# Patient Record
Sex: MALE | Race: WHITE | Employment: OTHER | URBAN - METROPOLITAN AREA
[De-identification: names, ages, dates, MRNs, and addresses within clinical notes are randomized per-mention and may not be internally consistent; named-entity substitution may affect disease eponyms.]

---

## 2017-01-24 ENCOUNTER — TELEPHONE (OUTPATIENT)
Dept: FAMILY MEDICINE CLINIC | Age: 47
End: 2017-01-24

## 2017-03-29 RX ORDER — OXYBUTYNIN CHLORIDE 5 MG/1
TABLET ORAL
Qty: 270 TABLET | Refills: 2 | Status: SHIPPED | OUTPATIENT
Start: 2017-03-29 | End: 2017-06-04

## 2017-06-01 ENCOUNTER — TELEPHONE (OUTPATIENT)
Dept: FAMILY MEDICINE CLINIC | Age: 47
End: 2017-06-01

## 2017-06-01 LAB
ALBUMIN SERPL-MCNC: 3.5 G/DL (ref 3.2–5.3)
ALK PHOSPHATASE: 134 IU/L (ref 35–121)
ALT SERPL-CCNC: 12 IU/L (ref 5–59)
ANION GAP SERPL CALCULATED.3IONS-SCNC: 13 MMOL/L
AST SERPL-CCNC: 16 IU/L (ref 10–42)
BILIRUB SERPL-MCNC: 0.2 MG/DL (ref 0.2–1.3)
BUN BLDV-MCNC: 13 MG/DL (ref 10–20)
CALCIUM SERPL-MCNC: 9 MG/DL (ref 8.7–10.8)
CHLORIDE BLD-SCNC: 102 MMOL/L (ref 95–111)
CHOLESTEROL/HDL RATIO: 3.9
CHOLESTEROL: 138 MG/DL
CO2: 27 MMOL/L (ref 21–32)
CREAT SERPL-MCNC: 0.7 MG/DL (ref 0.5–1.3)
EGFR AFRICAN AMERICAN: 146
EGFR IF NONAFRICAN AMERICAN: 121
GLUCOSE: 83 MG/DL (ref 70–100)
HDLC SERPL-MCNC: 35 MG/DL (ref 40–60)
LDL CHOLESTEROL CALCULATED: 76 MG/DL
LDL/HDL RATIO: 2.2
POTASSIUM SERPL-SCNC: 4 MMOL/L (ref 3.5–5.4)
SODIUM BLD-SCNC: 138 MMOL/L (ref 134–147)
TOTAL PROTEIN: 7.2 G/DL (ref 5.8–8)
TRIGL SERPL-MCNC: 133 MG/DL
VLDLC SERPL CALC-MCNC: 27 MG/DL

## 2017-06-14 ENCOUNTER — OFFICE VISIT (OUTPATIENT)
Dept: FAMILY MEDICINE CLINIC | Age: 47
End: 2017-06-14

## 2017-06-14 VITALS
TEMPERATURE: 98.6 F | SYSTOLIC BLOOD PRESSURE: 124 MMHG | RESPIRATION RATE: 18 BRPM | DIASTOLIC BLOOD PRESSURE: 86 MMHG | HEART RATE: 76 BPM

## 2017-06-14 DIAGNOSIS — Z74.09 IMPAIRED MOBILITY AND ADLS: ICD-10-CM

## 2017-06-14 DIAGNOSIS — Z78.9 IMPAIRED MOBILITY AND ADLS: ICD-10-CM

## 2017-06-14 DIAGNOSIS — N39.0 URINARY TRACT INFECTION WITH HEMATURIA, SITE UNSPECIFIED: Primary | ICD-10-CM

## 2017-06-14 DIAGNOSIS — G82.20 LOWER PARAPLEGIA (HCC): Chronic | ICD-10-CM

## 2017-06-14 DIAGNOSIS — R31.9 URINARY TRACT INFECTION WITH HEMATURIA, SITE UNSPECIFIED: Primary | ICD-10-CM

## 2017-06-14 LAB
BILIRUBIN, POC: ABNORMAL
BLOOD URINE, POC: ABNORMAL
CLARITY, POC: ABNORMAL
COLOR, POC: ABNORMAL
GLUCOSE URINE, POC: ABNORMAL
KETONES, POC: ABNORMAL
LEUKOCYTE EST, POC: ABNORMAL
NITRITE, POC: ABNORMAL
PH, POC: 7
PROTEIN, POC: ABNORMAL
SPECIFIC GRAVITY, POC: 1.02
UROBILINOGEN, POC: 0.2

## 2017-06-14 PROCEDURE — 99214 OFFICE O/P EST MOD 30 MIN: CPT | Performed by: FAMILY MEDICINE

## 2017-06-14 PROCEDURE — 1111F DSCHRG MED/CURRENT MED MERGE: CPT | Performed by: FAMILY MEDICINE

## 2017-06-14 PROCEDURE — G8427 DOCREV CUR MEDS BY ELIG CLIN: HCPCS | Performed by: FAMILY MEDICINE

## 2017-06-14 PROCEDURE — 1036F TOBACCO NON-USER: CPT | Performed by: FAMILY MEDICINE

## 2017-06-14 PROCEDURE — 81002 URINALYSIS NONAUTO W/O SCOPE: CPT | Performed by: FAMILY MEDICINE

## 2017-06-14 PROCEDURE — G8420 CALC BMI NORM PARAMETERS: HCPCS | Performed by: FAMILY MEDICINE

## 2017-06-14 RX ORDER — CIPROFLOXACIN 500 MG/1
500 TABLET, FILM COATED ORAL 2 TIMES DAILY
Qty: 20 TABLET | Refills: 0 | Status: SHIPPED | OUTPATIENT
Start: 2017-06-14 | End: 2017-06-24

## 2017-06-14 ASSESSMENT — ENCOUNTER SYMPTOMS
DIARRHEA: 0
CONSTIPATION: 0
SHORTNESS OF BREATH: 0
RHINORRHEA: 0
NAUSEA: 0
ABDOMINAL DISTENTION: 0
SINUS PRESSURE: 0
COUGH: 0
EYE PAIN: 0
SORE THROAT: 0
ABDOMINAL PAIN: 0

## 2017-06-16 LAB — URINE CULTURE, ROUTINE: NORMAL

## 2017-07-17 ENCOUNTER — APPOINTMENT (OUTPATIENT)
Dept: GENERAL RADIOLOGY | Age: 47
DRG: 690 | End: 2017-07-17
Payer: MEDICARE

## 2017-07-17 ENCOUNTER — APPOINTMENT (OUTPATIENT)
Dept: CT IMAGING | Age: 47
DRG: 690 | End: 2017-07-17
Payer: MEDICARE

## 2017-07-17 ENCOUNTER — HOSPITAL ENCOUNTER (INPATIENT)
Age: 47
LOS: 2 days | Discharge: HOME | DRG: 690 | End: 2017-07-19
Attending: HOSPITALIST | Admitting: HOSPITALIST
Payer: MEDICARE

## 2017-07-17 DIAGNOSIS — N10 ACUTE PYELONEPHRITIS: Primary | ICD-10-CM

## 2017-07-17 DIAGNOSIS — R93.5 ABNORMAL ABDOMINAL CT SCAN: ICD-10-CM

## 2017-07-17 LAB
ALBUMIN SERPL-MCNC: 3.7 G/DL (ref 3.5–5.1)
ALP BLD-CCNC: 142 U/L (ref 38–126)
ALT SERPL-CCNC: 14 U/L (ref 11–66)
AMORPHOUS: ABNORMAL
AMPHETAMINE+METHAMPHETAMINE URINE SCREEN: NEGATIVE
ANION GAP SERPL CALCULATED.3IONS-SCNC: 12 MEQ/L (ref 8–16)
ANISOCYTOSIS: ABNORMAL
AST SERPL-CCNC: 21 U/L (ref 5–40)
BACTERIA: ABNORMAL /HPF
BARBITURATE QUANTITATIVE URINE: NEGATIVE
BASOPHILS # BLD: 0.6 %
BASOPHILS ABSOLUTE: 0.1 THOU/MM3 (ref 0–0.1)
BENZODIAZEPINE QUANTITATIVE URINE: NEGATIVE
BILIRUB SERPL-MCNC: < 0.2 MG/DL (ref 0.3–1.2)
BILIRUBIN URINE: NEGATIVE
BLOOD, URINE: ABNORMAL
BUN BLDV-MCNC: 13 MG/DL (ref 7–22)
CALCIUM SERPL-MCNC: 9.7 MG/DL (ref 8.5–10.5)
CANNABINOID QUANTITATIVE URINE: NORMAL
CASTS 2: ABNORMAL /LPF
CASTS UA: ABNORMAL /LPF
CHARACTER, URINE: ABNORMAL
CHLORIDE BLD-SCNC: 97 MEQ/L (ref 98–111)
CO2: 28 MEQ/L (ref 23–33)
COCAINE METABOLITE QUANTITATIVE URINE: NEGATIVE
COLOR: YELLOW
CREAT SERPL-MCNC: 0.7 MG/DL (ref 0.4–1.2)
CRYSTALS, UA: ABNORMAL
EKG ATRIAL RATE: 88 BPM
EKG P AXIS: 40 DEGREES
EKG P-R INTERVAL: 140 MS
EKG Q-T INTERVAL: 354 MS
EKG QRS DURATION: 88 MS
EKG QTC CALCULATION (BAZETT): 428 MS
EKG R AXIS: 26 DEGREES
EKG T AXIS: 25 DEGREES
EKG VENTRICULAR RATE: 88 BPM
EOSINOPHIL # BLD: 1.3 %
EOSINOPHILS ABSOLUTE: 0.1 THOU/MM3 (ref 0–0.4)
EPITHELIAL CELLS, UA: ABNORMAL /HPF
ETHYL ALCOHOL, SERUM: < 0.01 %
GFR SERPL CREATININE-BSD FRML MDRD: > 90 ML/MIN/1.73M2
GLUCOSE BLD-MCNC: 83 MG/DL (ref 70–108)
GLUCOSE URINE: NEGATIVE MG/DL
HCT VFR BLD CALC: 40.1 % (ref 42–52)
HEMOGLOBIN: 12.9 GM/DL (ref 14–18)
HYPOCHROMIA: ABNORMAL
KETONES, URINE: NEGATIVE
LACTIC ACID: 1.7 MMOL/L (ref 0.5–2.2)
LEUKOCYTE ESTERASE, URINE: ABNORMAL
LIPASE: 19.8 U/L (ref 5.6–51.3)
LYMPHOCYTES # BLD: 20.2 %
LYMPHOCYTES ABSOLUTE: 1.9 THOU/MM3 (ref 1–4.8)
MAGNESIUM: 1.8 MG/DL (ref 1.6–2.4)
MCH RBC QN AUTO: 25.7 PG (ref 27–31)
MCHC RBC AUTO-ENTMCNC: 32.2 GM/DL (ref 33–37)
MCV RBC AUTO: 79.8 FL (ref 80–94)
MICROCYTES: ABNORMAL
MISCELLANEOUS 2: ABNORMAL
MONOCYTES # BLD: 11.7 %
MONOCYTES ABSOLUTE: 1.1 THOU/MM3 (ref 0.4–1.3)
NITRITE, URINE: POSITIVE
NUCLEATED RED BLOOD CELLS: 0 /100 WBC
OPIATES, URINE: NORMAL
OXYCODONE: NEGATIVE
PDW BLD-RTO: 15.5 % (ref 11.5–14.5)
PH UA: 6.5
PHENCYCLIDINE QUANTITATIVE URINE: NEGATIVE
PLATELET # BLD: 327 THOU/MM3 (ref 130–400)
PMV BLD AUTO: 8.1 MCM (ref 7.4–10.4)
POTASSIUM SERPL-SCNC: 4 MEQ/L (ref 3.5–5.2)
PROTEIN UA: ABNORMAL
RBC # BLD: 5.03 MILL/MM3 (ref 4.7–6.1)
RBC # BLD: NORMAL 10*6/UL
RBC URINE: ABNORMAL /HPF
RENAL EPITHELIAL, UA: ABNORMAL
SEG NEUTROPHILS: 66.2 %
SEGMENTED NEUTROPHILS ABSOLUTE COUNT: 6.2 THOU/MM3 (ref 1.8–7.7)
SODIUM BLD-SCNC: 137 MEQ/L (ref 135–145)
SPECIFIC GRAVITY, URINE: 1.02 (ref 1–1.03)
TOTAL PROTEIN: 7.7 G/DL (ref 6.1–8)
TROPONIN T: < 0.01 NG/ML
UROBILINOGEN, URINE: 0.2 EU/DL
WBC # BLD: 9.3 THOU/MM3 (ref 4.8–10.8)
WBC UA: > 200 /HPF
YEAST: ABNORMAL

## 2017-07-17 PROCEDURE — 85025 COMPLETE CBC W/AUTO DIFF WBC: CPT

## 2017-07-17 PROCEDURE — 93005 ELECTROCARDIOGRAM TRACING: CPT

## 2017-07-17 PROCEDURE — 96376 TX/PRO/DX INJ SAME DRUG ADON: CPT

## 2017-07-17 PROCEDURE — G0480 DRUG TEST DEF 1-7 CLASSES: HCPCS

## 2017-07-17 PROCEDURE — 2580000003 HC RX 258: Performed by: PHYSICIAN ASSISTANT

## 2017-07-17 PROCEDURE — 36415 COLL VENOUS BLD VENIPUNCTURE: CPT

## 2017-07-17 PROCEDURE — 87086 URINE CULTURE/COLONY COUNT: CPT

## 2017-07-17 PROCEDURE — 96375 TX/PRO/DX INJ NEW DRUG ADDON: CPT

## 2017-07-17 PROCEDURE — 1200000003 HC TELEMETRY R&B

## 2017-07-17 PROCEDURE — 74176 CT ABD & PELVIS W/O CONTRAST: CPT

## 2017-07-17 PROCEDURE — 87040 BLOOD CULTURE FOR BACTERIA: CPT

## 2017-07-17 PROCEDURE — 84484 ASSAY OF TROPONIN QUANT: CPT

## 2017-07-17 PROCEDURE — 83690 ASSAY OF LIPASE: CPT

## 2017-07-17 PROCEDURE — 99222 1ST HOSP IP/OBS MODERATE 55: CPT | Performed by: HOSPITALIST

## 2017-07-17 PROCEDURE — 81001 URINALYSIS AUTO W/SCOPE: CPT

## 2017-07-17 PROCEDURE — 6370000000 HC RX 637 (ALT 250 FOR IP): Performed by: HOSPITALIST

## 2017-07-17 PROCEDURE — 87077 CULTURE AEROBIC IDENTIFY: CPT

## 2017-07-17 PROCEDURE — 80053 COMPREHEN METABOLIC PANEL: CPT

## 2017-07-17 PROCEDURE — 83735 ASSAY OF MAGNESIUM: CPT

## 2017-07-17 PROCEDURE — 2580000003 HC RX 258: Performed by: HOSPITALIST

## 2017-07-17 PROCEDURE — 87186 SC STD MICRODIL/AGAR DIL: CPT

## 2017-07-17 PROCEDURE — 6360000002 HC RX W HCPCS: Performed by: HOSPITALIST

## 2017-07-17 PROCEDURE — 80320 DRUG SCREEN QUANTALCOHOLS: CPT

## 2017-07-17 PROCEDURE — 80305 DRUG TEST PRSMV DIR OPT OBS: CPT

## 2017-07-17 PROCEDURE — 6360000002 HC RX W HCPCS: Performed by: PHYSICIAN ASSISTANT

## 2017-07-17 PROCEDURE — 96365 THER/PROPH/DIAG IV INF INIT: CPT

## 2017-07-17 PROCEDURE — 99285 EMERGENCY DEPT VISIT HI MDM: CPT

## 2017-07-17 PROCEDURE — 71020 XR CHEST STANDARD TWO VW: CPT

## 2017-07-17 PROCEDURE — 83605 ASSAY OF LACTIC ACID: CPT

## 2017-07-17 PROCEDURE — 99221 1ST HOSP IP/OBS SF/LOW 40: CPT | Performed by: NURSE PRACTITIONER

## 2017-07-17 PROCEDURE — 96361 HYDRATE IV INFUSION ADD-ON: CPT

## 2017-07-17 RX ORDER — PANTOPRAZOLE SODIUM 40 MG/1
40 TABLET, DELAYED RELEASE ORAL 2 TIMES DAILY
Status: DISCONTINUED | OUTPATIENT
Start: 2017-07-17 | End: 2017-07-19 | Stop reason: HOSPADM

## 2017-07-17 RX ORDER — OXYCODONE AND ACETAMINOPHEN 10; 325 MG/1; MG/1
1 TABLET ORAL EVERY 8 HOURS PRN
Status: DISCONTINUED | OUTPATIENT
Start: 2017-07-17 | End: 2017-07-19 | Stop reason: HOSPADM

## 2017-07-17 RX ORDER — SODIUM CHLORIDE 0.9 % (FLUSH) 0.9 %
10 SYRINGE (ML) INJECTION EVERY 12 HOURS SCHEDULED
Status: DISCONTINUED | OUTPATIENT
Start: 2017-07-17 | End: 2017-07-19 | Stop reason: HOSPADM

## 2017-07-17 RX ORDER — FENTANYL 25 UG/H
1 PATCH TRANSDERMAL
Status: DISCONTINUED | OUTPATIENT
Start: 2017-07-17 | End: 2017-07-19 | Stop reason: HOSPADM

## 2017-07-17 RX ORDER — AMITRIPTYLINE HYDROCHLORIDE 75 MG/1
75 TABLET, FILM COATED ORAL NIGHTLY
Status: DISCONTINUED | OUTPATIENT
Start: 2017-07-17 | End: 2017-07-19 | Stop reason: HOSPADM

## 2017-07-17 RX ORDER — AMLODIPINE BESYLATE 10 MG/1
10 TABLET ORAL DAILY
Status: DISCONTINUED | OUTPATIENT
Start: 2017-07-17 | End: 2017-07-19 | Stop reason: HOSPADM

## 2017-07-17 RX ORDER — 0.9 % SODIUM CHLORIDE 0.9 %
500 INTRAVENOUS SOLUTION INTRAVENOUS ONCE
Status: COMPLETED | OUTPATIENT
Start: 2017-07-17 | End: 2017-07-17

## 2017-07-17 RX ORDER — METOPROLOL TARTRATE 50 MG/1
50 TABLET, FILM COATED ORAL 2 TIMES DAILY
Status: DISCONTINUED | OUTPATIENT
Start: 2017-07-17 | End: 2017-07-19 | Stop reason: HOSPADM

## 2017-07-17 RX ORDER — SODIUM CHLORIDE 0.9 % (FLUSH) 0.9 %
10 SYRINGE (ML) INJECTION PRN
Status: DISCONTINUED | OUTPATIENT
Start: 2017-07-17 | End: 2017-07-19 | Stop reason: HOSPADM

## 2017-07-17 RX ORDER — ONDANSETRON 2 MG/ML
4 INJECTION INTRAMUSCULAR; INTRAVENOUS ONCE
Status: COMPLETED | OUTPATIENT
Start: 2017-07-17 | End: 2017-07-17

## 2017-07-17 RX ORDER — GABAPENTIN 400 MG/1
800 CAPSULE ORAL 3 TIMES DAILY
Status: DISCONTINUED | OUTPATIENT
Start: 2017-07-17 | End: 2017-07-19 | Stop reason: HOSPADM

## 2017-07-17 RX ORDER — ONDANSETRON 2 MG/ML
4 INJECTION INTRAMUSCULAR; INTRAVENOUS EVERY 6 HOURS PRN
Status: DISCONTINUED | OUTPATIENT
Start: 2017-07-17 | End: 2017-07-19 | Stop reason: HOSPADM

## 2017-07-17 RX ORDER — MORPHINE SULFATE 4 MG/ML
4 INJECTION, SOLUTION INTRAMUSCULAR; INTRAVENOUS ONCE
Status: COMPLETED | OUTPATIENT
Start: 2017-07-17 | End: 2017-07-17

## 2017-07-17 RX ORDER — MORPHINE SULFATE 2 MG/ML
2 INJECTION, SOLUTION INTRAMUSCULAR; INTRAVENOUS EVERY 4 HOURS PRN
Status: DISCONTINUED | OUTPATIENT
Start: 2017-07-17 | End: 2017-07-19 | Stop reason: HOSPADM

## 2017-07-17 RX ORDER — OXYBUTYNIN CHLORIDE 5 MG/1
5 TABLET ORAL 3 TIMES DAILY
Status: DISCONTINUED | OUTPATIENT
Start: 2017-07-17 | End: 2017-07-19 | Stop reason: HOSPADM

## 2017-07-17 RX ORDER — ACETAMINOPHEN 325 MG/1
650 TABLET ORAL EVERY 4 HOURS PRN
Status: DISCONTINUED | OUTPATIENT
Start: 2017-07-17 | End: 2017-07-19 | Stop reason: HOSPADM

## 2017-07-17 RX ADMIN — MORPHINE SULFATE 4 MG: 4 INJECTION, SOLUTION INTRAMUSCULAR; INTRAVENOUS at 08:51

## 2017-07-17 RX ADMIN — ENOXAPARIN SODIUM 40 MG: 40 INJECTION SUBCUTANEOUS at 13:35

## 2017-07-17 RX ADMIN — AMITRIPTYLINE HYDROCHLORIDE 75 MG: 75 TABLET, FILM COATED ORAL at 20:42

## 2017-07-17 RX ADMIN — VANCOMYCIN HYDROCHLORIDE 1000 MG: 1 INJECTION, POWDER, LYOPHILIZED, FOR SOLUTION INTRAVENOUS at 14:49

## 2017-07-17 RX ADMIN — OXYBUTYNIN CHLORIDE 5 MG: 5 TABLET ORAL at 14:52

## 2017-07-17 RX ADMIN — METOPROLOL TARTRATE 50 MG: 50 TABLET, FILM COATED ORAL at 14:52

## 2017-07-17 RX ADMIN — SODIUM CHLORIDE 1000 ML: 9 INJECTION, SOLUTION INTRAVENOUS at 07:12

## 2017-07-17 RX ADMIN — MORPHINE SULFATE 2 MG: 2 INJECTION, SOLUTION INTRAMUSCULAR; INTRAVENOUS at 13:36

## 2017-07-17 RX ADMIN — Medication 10 ML: at 13:37

## 2017-07-17 RX ADMIN — ONDANSETRON 4 MG: 2 INJECTION INTRAMUSCULAR; INTRAVENOUS at 07:12

## 2017-07-17 RX ADMIN — Medication 10 ML: at 20:43

## 2017-07-17 RX ADMIN — METOPROLOL TARTRATE 50 MG: 50 TABLET, FILM COATED ORAL at 20:43

## 2017-07-17 RX ADMIN — OXYBUTYNIN CHLORIDE 5 MG: 5 TABLET ORAL at 20:42

## 2017-07-17 RX ADMIN — GABAPENTIN 800 MG: 400 CAPSULE ORAL at 20:42

## 2017-07-17 RX ADMIN — PANTOPRAZOLE SODIUM 40 MG: 40 TABLET, DELAYED RELEASE ORAL at 14:52

## 2017-07-17 RX ADMIN — PIPERACILLIN SODIUM,TAZOBACTAM SODIUM 3.38 G: 3; .375 INJECTION, POWDER, FOR SOLUTION INTRAVENOUS at 15:00

## 2017-07-17 RX ADMIN — MORPHINE SULFATE 4 MG: 4 INJECTION, SOLUTION INTRAMUSCULAR; INTRAVENOUS at 07:12

## 2017-07-17 RX ADMIN — CEFTRIAXONE 1 G: 1 INJECTION, POWDER, FOR SOLUTION INTRAMUSCULAR; INTRAVENOUS at 08:50

## 2017-07-17 RX ADMIN — GABAPENTIN 800 MG: 400 CAPSULE ORAL at 14:52

## 2017-07-17 RX ADMIN — AMLODIPINE BESYLATE 10 MG: 10 TABLET ORAL at 14:52

## 2017-07-17 RX ADMIN — PANTOPRAZOLE SODIUM 40 MG: 40 TABLET, DELAYED RELEASE ORAL at 20:42

## 2017-07-17 RX ADMIN — PIPERACILLIN SODIUM,TAZOBACTAM SODIUM 3.38 G: 3; .375 INJECTION, POWDER, FOR SOLUTION INTRAVENOUS at 22:32

## 2017-07-17 RX ADMIN — OXYCODONE HYDROCHLORIDE AND ACETAMINOPHEN 1 TABLET: 10; 325 TABLET ORAL at 16:21

## 2017-07-17 RX ADMIN — MORPHINE SULFATE 2 MG: 2 INJECTION, SOLUTION INTRAMUSCULAR; INTRAVENOUS at 20:42

## 2017-07-17 ASSESSMENT — PAIN DESCRIPTION - LOCATION
LOCATION: FLANK

## 2017-07-17 ASSESSMENT — ENCOUNTER SYMPTOMS
NAUSEA: 0
BACK PAIN: 0
EYE REDNESS: 0
EYE DISCHARGE: 0
ABDOMINAL PAIN: 1
SORE THROAT: 0
DIARRHEA: 0
VOMITING: 0
SHORTNESS OF BREATH: 0
RHINORRHEA: 0
COUGH: 0
WHEEZING: 0

## 2017-07-17 ASSESSMENT — PAIN SCALES - GENERAL
PAINLEVEL_OUTOF10: 8
PAINLEVEL_OUTOF10: 9
PAINLEVEL_OUTOF10: 8
PAINLEVEL_OUTOF10: 9
PAINLEVEL_OUTOF10: 7
PAINLEVEL_OUTOF10: 9
PAINLEVEL_OUTOF10: 9
PAINLEVEL_OUTOF10: 8
PAINLEVEL_OUTOF10: 9
PAINLEVEL_OUTOF10: 4
PAINLEVEL_OUTOF10: 6

## 2017-07-17 ASSESSMENT — PAIN DESCRIPTION - ORIENTATION
ORIENTATION: RIGHT

## 2017-07-17 ASSESSMENT — PAIN DESCRIPTION - PAIN TYPE
TYPE: ACUTE PAIN

## 2017-07-17 ASSESSMENT — PAIN DESCRIPTION - ONSET: ONSET: AWAKENED FROM SLEEP

## 2017-07-17 ASSESSMENT — PAIN DESCRIPTION - FREQUENCY: FREQUENCY: OTHER (COMMENT)

## 2017-07-18 LAB
ANION GAP SERPL CALCULATED.3IONS-SCNC: 11 MEQ/L (ref 8–16)
ANISOCYTOSIS: ABNORMAL
BASOPHILS # BLD: 0.5 %
BASOPHILS ABSOLUTE: 0 THOU/MM3 (ref 0–0.1)
BUN BLDV-MCNC: 11 MG/DL (ref 7–22)
CALCIUM SERPL-MCNC: 8.6 MG/DL (ref 8.5–10.5)
CHLORIDE BLD-SCNC: 100 MEQ/L (ref 98–111)
CO2: 26 MEQ/L (ref 23–33)
CREAT SERPL-MCNC: 0.8 MG/DL (ref 0.4–1.2)
EOSINOPHIL # BLD: 2.5 %
EOSINOPHILS ABSOLUTE: 0.2 THOU/MM3 (ref 0–0.4)
GFR SERPL CREATININE-BSD FRML MDRD: > 90 ML/MIN/1.73M2
GLUCOSE BLD-MCNC: 115 MG/DL (ref 70–108)
HCT VFR BLD CALC: 37.5 % (ref 42–52)
HEMOGLOBIN: 12 GM/DL (ref 14–18)
HYPOCHROMIA: ABNORMAL
LYMPHOCYTES # BLD: 22.3 %
LYMPHOCYTES ABSOLUTE: 2.2 THOU/MM3 (ref 1–4.8)
MCH RBC QN AUTO: 25.7 PG (ref 27–31)
MCHC RBC AUTO-ENTMCNC: 32 GM/DL (ref 33–37)
MCV RBC AUTO: 80.4 FL (ref 80–94)
MICROCYTES: ABNORMAL
MONOCYTES # BLD: 9.9 %
MONOCYTES ABSOLUTE: 1 THOU/MM3 (ref 0.4–1.3)
NUCLEATED RED BLOOD CELLS: 0 /100 WBC
PDW BLD-RTO: 16 % (ref 11.5–14.5)
PLATELET # BLD: 321 THOU/MM3 (ref 130–400)
PMV BLD AUTO: 8.1 MCM (ref 7.4–10.4)
POTASSIUM SERPL-SCNC: 4.3 MEQ/L (ref 3.5–5.2)
RBC # BLD: 4.66 MILL/MM3 (ref 4.7–6.1)
RBC # BLD: NORMAL 10*6/UL
SEG NEUTROPHILS: 64.8 %
SEGMENTED NEUTROPHILS ABSOLUTE COUNT: 6.4 THOU/MM3 (ref 1.8–7.7)
SODIUM BLD-SCNC: 137 MEQ/L (ref 135–145)
WBC # BLD: 9.8 THOU/MM3 (ref 4.8–10.8)

## 2017-07-18 PROCEDURE — 6360000002 HC RX W HCPCS: Performed by: HOSPITALIST

## 2017-07-18 PROCEDURE — 1200000003 HC TELEMETRY R&B

## 2017-07-18 PROCEDURE — 99232 SBSQ HOSP IP/OBS MODERATE 35: CPT | Performed by: INTERNAL MEDICINE

## 2017-07-18 PROCEDURE — 6370000000 HC RX 637 (ALT 250 FOR IP): Performed by: HOSPITALIST

## 2017-07-18 PROCEDURE — 85025 COMPLETE CBC W/AUTO DIFF WBC: CPT

## 2017-07-18 PROCEDURE — 80048 BASIC METABOLIC PNL TOTAL CA: CPT

## 2017-07-18 PROCEDURE — 36415 COLL VENOUS BLD VENIPUNCTURE: CPT

## 2017-07-18 PROCEDURE — 2580000003 HC RX 258: Performed by: HOSPITALIST

## 2017-07-18 RX ORDER — TRAMADOL HYDROCHLORIDE 50 MG/1
TABLET ORAL EVERY 6 HOURS PRN
Status: ON HOLD | COMMUNITY
End: 2019-06-13 | Stop reason: HOSPADM

## 2017-07-18 RX ADMIN — GABAPENTIN 800 MG: 400 CAPSULE ORAL at 08:29

## 2017-07-18 RX ADMIN — PIPERACILLIN SODIUM,TAZOBACTAM SODIUM 3.38 G: 3; .375 INJECTION, POWDER, FOR SOLUTION INTRAVENOUS at 15:32

## 2017-07-18 RX ADMIN — Medication 10 ML: at 20:22

## 2017-07-18 RX ADMIN — METOPROLOL TARTRATE 50 MG: 50 TABLET, FILM COATED ORAL at 08:29

## 2017-07-18 RX ADMIN — PANTOPRAZOLE SODIUM 40 MG: 40 TABLET, DELAYED RELEASE ORAL at 20:22

## 2017-07-18 RX ADMIN — OXYCODONE HYDROCHLORIDE AND ACETAMINOPHEN 1 TABLET: 10; 325 TABLET ORAL at 08:34

## 2017-07-18 RX ADMIN — AMITRIPTYLINE HYDROCHLORIDE 75 MG: 75 TABLET, FILM COATED ORAL at 20:22

## 2017-07-18 RX ADMIN — VANCOMYCIN HYDROCHLORIDE 1000 MG: 1 INJECTION, POWDER, LYOPHILIZED, FOR SOLUTION INTRAVENOUS at 14:27

## 2017-07-18 RX ADMIN — PIPERACILLIN SODIUM,TAZOBACTAM SODIUM 3.38 G: 3; .375 INJECTION, POWDER, FOR SOLUTION INTRAVENOUS at 06:37

## 2017-07-18 RX ADMIN — MORPHINE SULFATE 2 MG: 2 INJECTION, SOLUTION INTRAMUSCULAR; INTRAVENOUS at 15:36

## 2017-07-18 RX ADMIN — PIPERACILLIN SODIUM,TAZOBACTAM SODIUM 3.38 G: 3; .375 INJECTION, POWDER, FOR SOLUTION INTRAVENOUS at 22:07

## 2017-07-18 RX ADMIN — VANCOMYCIN HYDROCHLORIDE 1000 MG: 1 INJECTION, POWDER, LYOPHILIZED, FOR SOLUTION INTRAVENOUS at 02:14

## 2017-07-18 RX ADMIN — GABAPENTIN 800 MG: 400 CAPSULE ORAL at 14:24

## 2017-07-18 RX ADMIN — ONDANSETRON 4 MG: 2 INJECTION INTRAMUSCULAR; INTRAVENOUS at 20:22

## 2017-07-18 RX ADMIN — ENOXAPARIN SODIUM 40 MG: 40 INJECTION SUBCUTANEOUS at 14:22

## 2017-07-18 RX ADMIN — Medication 10 ML: at 08:30

## 2017-07-18 RX ADMIN — MORPHINE SULFATE 2 MG: 2 INJECTION, SOLUTION INTRAMUSCULAR; INTRAVENOUS at 02:15

## 2017-07-18 RX ADMIN — GABAPENTIN 800 MG: 400 CAPSULE ORAL at 20:22

## 2017-07-18 RX ADMIN — MORPHINE SULFATE 2 MG: 2 INJECTION, SOLUTION INTRAMUSCULAR; INTRAVENOUS at 20:22

## 2017-07-18 RX ADMIN — AMLODIPINE BESYLATE 10 MG: 10 TABLET ORAL at 08:29

## 2017-07-18 RX ADMIN — PANTOPRAZOLE SODIUM 40 MG: 40 TABLET, DELAYED RELEASE ORAL at 08:29

## 2017-07-18 RX ADMIN — OXYCODONE HYDROCHLORIDE AND ACETAMINOPHEN 1 TABLET: 10; 325 TABLET ORAL at 00:50

## 2017-07-18 RX ADMIN — OXYBUTYNIN CHLORIDE 5 MG: 5 TABLET ORAL at 22:07

## 2017-07-18 RX ADMIN — OXYCODONE HYDROCHLORIDE AND ACETAMINOPHEN 1 TABLET: 10; 325 TABLET ORAL at 17:27

## 2017-07-18 RX ADMIN — OXYBUTYNIN CHLORIDE 5 MG: 5 TABLET ORAL at 08:29

## 2017-07-18 RX ADMIN — Medication 10 ML: at 02:15

## 2017-07-18 RX ADMIN — METOPROLOL TARTRATE 50 MG: 50 TABLET, FILM COATED ORAL at 20:22

## 2017-07-18 RX ADMIN — MORPHINE SULFATE 2 MG: 2 INJECTION, SOLUTION INTRAMUSCULAR; INTRAVENOUS at 11:41

## 2017-07-18 RX ADMIN — MORPHINE SULFATE 2 MG: 2 INJECTION, SOLUTION INTRAMUSCULAR; INTRAVENOUS at 06:37

## 2017-07-18 RX ADMIN — OXYBUTYNIN CHLORIDE 5 MG: 5 TABLET ORAL at 14:24

## 2017-07-18 ASSESSMENT — PAIN SCALES - GENERAL
PAINLEVEL_OUTOF10: 8
PAINLEVEL_OUTOF10: 5
PAINLEVEL_OUTOF10: 8
PAINLEVEL_OUTOF10: 7
PAINLEVEL_OUTOF10: 6
PAINLEVEL_OUTOF10: 8
PAINLEVEL_OUTOF10: 7
PAINLEVEL_OUTOF10: 7
PAINLEVEL_OUTOF10: 6
PAINLEVEL_OUTOF10: 7
PAINLEVEL_OUTOF10: 6
PAINLEVEL_OUTOF10: 7
PAINLEVEL_OUTOF10: 8

## 2017-07-18 ASSESSMENT — PAIN DESCRIPTION - PAIN TYPE
TYPE: CHRONIC PAIN
TYPE: ACUTE PAIN;CHRONIC PAIN

## 2017-07-18 ASSESSMENT — PAIN DESCRIPTION - ORIENTATION: ORIENTATION: RIGHT

## 2017-07-18 ASSESSMENT — PAIN DESCRIPTION - LOCATION: LOCATION: FLANK

## 2017-07-19 ENCOUNTER — TELEPHONE (OUTPATIENT)
Dept: FAMILY MEDICINE CLINIC | Age: 47
End: 2017-07-19

## 2017-07-19 VITALS
BODY MASS INDEX: 21.06 KG/M2 | OXYGEN SATURATION: 98 % | HEIGHT: 70 IN | SYSTOLIC BLOOD PRESSURE: 118 MMHG | RESPIRATION RATE: 16 BRPM | HEART RATE: 68 BPM | DIASTOLIC BLOOD PRESSURE: 62 MMHG | TEMPERATURE: 98.3 F | WEIGHT: 147.1 LBS

## 2017-07-19 LAB
ORGANISM: ABNORMAL
URINE CULTURE REFLEX: ABNORMAL
VANCOMYCIN TROUGH: 11.4 UG/ML (ref 5–15)

## 2017-07-19 PROCEDURE — 6370000000 HC RX 637 (ALT 250 FOR IP): Performed by: HOSPITALIST

## 2017-07-19 PROCEDURE — 6370000000 HC RX 637 (ALT 250 FOR IP): Performed by: INTERNAL MEDICINE

## 2017-07-19 PROCEDURE — 2580000003 HC RX 258: Performed by: HOSPITALIST

## 2017-07-19 PROCEDURE — 99238 HOSP IP/OBS DSCHRG MGMT 30/<: CPT | Performed by: INTERNAL MEDICINE

## 2017-07-19 PROCEDURE — 6360000002 HC RX W HCPCS: Performed by: HOSPITALIST

## 2017-07-19 PROCEDURE — 36415 COLL VENOUS BLD VENIPUNCTURE: CPT

## 2017-07-19 PROCEDURE — 80202 ASSAY OF VANCOMYCIN: CPT

## 2017-07-19 RX ORDER — SULFAMETHOXAZOLE AND TRIMETHOPRIM 800; 160 MG/1; MG/1
1 TABLET ORAL EVERY 12 HOURS SCHEDULED
Qty: 28 TABLET | Refills: 0 | Status: SHIPPED | OUTPATIENT
Start: 2017-07-19 | End: 2017-08-02

## 2017-07-19 RX ORDER — METOPROLOL TARTRATE 50 MG/1
50 TABLET, FILM COATED ORAL 2 TIMES DAILY
Qty: 60 TABLET | Refills: 3 | Status: SHIPPED | OUTPATIENT
Start: 2017-07-19 | End: 2018-04-10

## 2017-07-19 RX ORDER — AMLODIPINE BESYLATE 10 MG/1
10 TABLET ORAL DAILY
Qty: 30 TABLET | Refills: 3 | Status: SHIPPED | OUTPATIENT
Start: 2017-07-19 | End: 2018-04-10

## 2017-07-19 RX ORDER — SULFAMETHOXAZOLE AND TRIMETHOPRIM 800; 160 MG/1; MG/1
1 TABLET ORAL EVERY 12 HOURS SCHEDULED
Status: DISCONTINUED | OUTPATIENT
Start: 2017-07-19 | End: 2017-07-19 | Stop reason: HOSPADM

## 2017-07-19 RX ADMIN — VANCOMYCIN HYDROCHLORIDE 1000 MG: 1 INJECTION, POWDER, LYOPHILIZED, FOR SOLUTION INTRAVENOUS at 03:14

## 2017-07-19 RX ADMIN — OXYBUTYNIN CHLORIDE 5 MG: 5 TABLET ORAL at 08:23

## 2017-07-19 RX ADMIN — OXYCODONE HYDROCHLORIDE AND ACETAMINOPHEN 1 TABLET: 10; 325 TABLET ORAL at 01:31

## 2017-07-19 RX ADMIN — Medication 10 ML: at 08:24

## 2017-07-19 RX ADMIN — SULFAMETHOXAZOLE AND TRIMETHOPRIM 1 TABLET: 800; 160 TABLET ORAL at 10:19

## 2017-07-19 RX ADMIN — AMLODIPINE BESYLATE 10 MG: 10 TABLET ORAL at 08:24

## 2017-07-19 RX ADMIN — GABAPENTIN 800 MG: 400 CAPSULE ORAL at 08:23

## 2017-07-19 RX ADMIN — MORPHINE SULFATE 2 MG: 2 INJECTION, SOLUTION INTRAMUSCULAR; INTRAVENOUS at 09:04

## 2017-07-19 RX ADMIN — METOPROLOL TARTRATE 50 MG: 50 TABLET, FILM COATED ORAL at 08:23

## 2017-07-19 RX ADMIN — OXYCODONE HYDROCHLORIDE AND ACETAMINOPHEN 1 TABLET: 10; 325 TABLET ORAL at 10:19

## 2017-07-19 RX ADMIN — PANTOPRAZOLE SODIUM 40 MG: 40 TABLET, DELAYED RELEASE ORAL at 08:24

## 2017-07-19 RX ADMIN — MORPHINE SULFATE 2 MG: 2 INJECTION, SOLUTION INTRAMUSCULAR; INTRAVENOUS at 02:35

## 2017-07-19 ASSESSMENT — PAIN DESCRIPTION - ORIENTATION: ORIENTATION: RIGHT

## 2017-07-19 ASSESSMENT — PAIN SCALES - GENERAL
PAINLEVEL_OUTOF10: 5
PAINLEVEL_OUTOF10: 8
PAINLEVEL_OUTOF10: 7
PAINLEVEL_OUTOF10: 8
PAINLEVEL_OUTOF10: 3
PAINLEVEL_OUTOF10: 5

## 2017-07-19 ASSESSMENT — PAIN DESCRIPTION - PAIN TYPE: TYPE: CHRONIC PAIN

## 2017-07-19 ASSESSMENT — PAIN DESCRIPTION - LOCATION: LOCATION: FLANK

## 2017-07-20 ENCOUNTER — CARE COORDINATION (OUTPATIENT)
Dept: CASE MANAGEMENT | Age: 47
End: 2017-07-20

## 2017-07-21 ENCOUNTER — TELEPHONE (OUTPATIENT)
Dept: UROLOGY | Age: 47
End: 2017-07-21

## 2017-07-21 ENCOUNTER — TELEPHONE (OUTPATIENT)
Dept: PHARMACY | Age: 47
End: 2017-07-21

## 2017-07-22 LAB
BLOOD CULTURE, ROUTINE: NORMAL
BLOOD CULTURE, ROUTINE: NORMAL

## 2017-07-26 ENCOUNTER — OFFICE VISIT (OUTPATIENT)
Dept: UROLOGY | Age: 47
End: 2017-07-26
Payer: MEDICARE

## 2017-07-26 ENCOUNTER — TELEPHONE (OUTPATIENT)
Dept: UROLOGY | Age: 47
End: 2017-07-26

## 2017-07-26 VITALS
SYSTOLIC BLOOD PRESSURE: 164 MMHG | WEIGHT: 145 LBS | BODY MASS INDEX: 21.48 KG/M2 | DIASTOLIC BLOOD PRESSURE: 92 MMHG | HEIGHT: 69 IN

## 2017-07-26 DIAGNOSIS — N30.00 ACUTE CYSTITIS WITHOUT HEMATURIA: Primary | ICD-10-CM

## 2017-07-26 DIAGNOSIS — N20.0 LEFT NEPHROLITHIASIS: Primary | ICD-10-CM

## 2017-07-26 DIAGNOSIS — Z01.818 PRE-OP TESTING: ICD-10-CM

## 2017-07-26 LAB
BILIRUBIN URINE: NEGATIVE
BLOOD URINE, POC: ABNORMAL
CHARACTER, URINE: ABNORMAL
COLOR, URINE: ABNORMAL
GLUCOSE URINE: NEGATIVE MG/DL
KETONES, URINE: NEGATIVE
LEUKOCYTE CLUMPS, URINE: ABNORMAL
NITRITE, URINE: NEGATIVE
PH, URINE: 6.5
PROTEIN, URINE: 30 MG/DL
SPECIFIC GRAVITY, URINE: 1.02 (ref 1–1.03)
UROBILINOGEN, URINE: 0.2 EU/DL

## 2017-07-26 PROCEDURE — G8427 DOCREV CUR MEDS BY ELIG CLIN: HCPCS | Performed by: NURSE PRACTITIONER

## 2017-07-26 PROCEDURE — G8420 CALC BMI NORM PARAMETERS: HCPCS | Performed by: NURSE PRACTITIONER

## 2017-07-26 PROCEDURE — 99213 OFFICE O/P EST LOW 20 MIN: CPT | Performed by: NURSE PRACTITIONER

## 2017-07-26 PROCEDURE — 1111F DSCHRG MED/CURRENT MED MERGE: CPT | Performed by: NURSE PRACTITIONER

## 2017-07-26 PROCEDURE — 1036F TOBACCO NON-USER: CPT | Performed by: NURSE PRACTITIONER

## 2017-07-26 PROCEDURE — 81003 URINALYSIS AUTO W/O SCOPE: CPT | Performed by: NURSE PRACTITIONER

## 2017-08-10 ENCOUNTER — TELEPHONE (OUTPATIENT)
Dept: UROLOGY | Age: 47
End: 2017-08-10

## 2017-08-10 DIAGNOSIS — R10.9 FLANK PAIN: Primary | ICD-10-CM

## 2017-08-10 RX ORDER — SULFAMETHOXAZOLE AND TRIMETHOPRIM 800; 160 MG/1; MG/1
1 TABLET ORAL 2 TIMES DAILY
Qty: 10 TABLET | Refills: 0 | Status: ON HOLD | OUTPATIENT
Start: 2017-08-10 | End: 2017-08-17

## 2017-08-15 ENCOUNTER — APPOINTMENT (OUTPATIENT)
Dept: ULTRASOUND IMAGING | Age: 47
DRG: 669 | End: 2017-08-15
Attending: UROLOGY
Payer: MEDICARE

## 2017-08-15 ENCOUNTER — APPOINTMENT (OUTPATIENT)
Dept: GENERAL RADIOLOGY | Age: 47
DRG: 669 | End: 2017-08-15
Attending: UROLOGY
Payer: MEDICARE

## 2017-08-15 ENCOUNTER — HOSPITAL ENCOUNTER (INPATIENT)
Age: 47
LOS: 1 days | Discharge: HOME | DRG: 669 | End: 2017-08-18
Attending: UROLOGY | Admitting: UROLOGY
Payer: MEDICARE

## 2017-08-15 ENCOUNTER — ANESTHESIA EVENT (OUTPATIENT)
Dept: OPERATING ROOM | Age: 47
DRG: 669 | End: 2017-08-15
Payer: MEDICARE

## 2017-08-15 ENCOUNTER — HOSPITAL ENCOUNTER (OUTPATIENT)
Dept: INTERVENTIONAL RADIOLOGY/VASCULAR | Age: 47
Discharge: HOME | DRG: 669 | End: 2017-08-15
Attending: UROLOGY
Payer: MEDICARE

## 2017-08-15 ENCOUNTER — ANESTHESIA (OUTPATIENT)
Dept: OPERATING ROOM | Age: 47
DRG: 669 | End: 2017-08-15
Payer: MEDICARE

## 2017-08-15 VITALS
DIASTOLIC BLOOD PRESSURE: 83 MMHG | TEMPERATURE: 98.6 F | SYSTOLIC BLOOD PRESSURE: 152 MMHG | RESPIRATION RATE: 6 BRPM | OXYGEN SATURATION: 100 %

## 2017-08-15 LAB
GLUCOSE BLD-MCNC: 139 MG/DL (ref 70–108)
INR BLD: 0.91 (ref 0.85–1.13)

## 2017-08-15 PROCEDURE — 96376 TX/PRO/DX INJ SAME DRUG ADON: CPT

## 2017-08-15 PROCEDURE — 96375 TX/PRO/DX INJ NEW DRUG ADDON: CPT

## 2017-08-15 PROCEDURE — C1726 CATH, BAL DIL, NON-VASCULAR: HCPCS | Performed by: UROLOGY

## 2017-08-15 PROCEDURE — C1758 CATHETER, URETERAL: HCPCS | Performed by: UROLOGY

## 2017-08-15 PROCEDURE — 50080 PERQ NL/PL LITHOTRP SMPL<2CM: CPT | Performed by: UROLOGY

## 2017-08-15 PROCEDURE — 6360000002 HC RX W HCPCS: Performed by: NURSE PRACTITIONER

## 2017-08-15 PROCEDURE — 6360000002 HC RX W HCPCS: Performed by: NURSE ANESTHETIST, CERTIFIED REGISTERED

## 2017-08-15 PROCEDURE — 7100000001 HC PACU RECOVERY - ADDTL 15 MIN: Performed by: UROLOGY

## 2017-08-15 PROCEDURE — 2580000003 HC RX 258: Performed by: UROLOGY

## 2017-08-15 PROCEDURE — A6258 TRANSPARENT FILM >16<=48 IN: HCPCS

## 2017-08-15 PROCEDURE — 6360000004 HC RX CONTRAST MEDICATION: Performed by: UROLOGY

## 2017-08-15 PROCEDURE — 0TJB8ZZ INSPECTION OF BLADDER, VIA NATURAL OR ARTIFICIAL OPENING ENDOSCOPIC: ICD-10-PCS | Performed by: UROLOGY

## 2017-08-15 PROCEDURE — 3600000014 HC SURGERY LEVEL 4 ADDTL 15MIN: Performed by: UROLOGY

## 2017-08-15 PROCEDURE — C1894 INTRO/SHEATH, NON-LASER: HCPCS

## 2017-08-15 PROCEDURE — 51702 INSERT TEMP BLADDER CATH: CPT

## 2017-08-15 PROCEDURE — 76775 US EXAM ABDO BACK WALL LIM: CPT

## 2017-08-15 PROCEDURE — 3700000001 HC ADD 15 MINUTES (ANESTHESIA): Performed by: UROLOGY

## 2017-08-15 PROCEDURE — C1887 CATHETER, GUIDING: HCPCS

## 2017-08-15 PROCEDURE — 51798 US URINE CAPACITY MEASURE: CPT

## 2017-08-15 PROCEDURE — 96361 HYDRATE IV INFUSION ADD-ON: CPT

## 2017-08-15 PROCEDURE — C1894 INTRO/SHEATH, NON-LASER: HCPCS | Performed by: UROLOGY

## 2017-08-15 PROCEDURE — A5063 DRAIN OSTOMY POUCH W/FLANGE: HCPCS | Performed by: UROLOGY

## 2017-08-15 PROCEDURE — 36415 COLL VENOUS BLD VENIPUNCTURE: CPT

## 2017-08-15 PROCEDURE — 50561 KIDNEY ENDOSCOPY & TREATMENT: CPT | Performed by: UROLOGY

## 2017-08-15 PROCEDURE — 0TC78ZZ EXTIRPATION OF MATTER FROM LEFT URETER, VIA NATURAL OR ARTIFICIAL OPENING ENDOSCOPIC: ICD-10-PCS | Performed by: UROLOGY

## 2017-08-15 PROCEDURE — 6360000002 HC RX W HCPCS: Performed by: ANESTHESIOLOGY

## 2017-08-15 PROCEDURE — 82365 CALCULUS SPECTROSCOPY: CPT

## 2017-08-15 PROCEDURE — 6370000000 HC RX 637 (ALT 250 FOR IP): Performed by: NURSE PRACTITIONER

## 2017-08-15 PROCEDURE — 50432 PLMT NEPHROSTOMY CATHETER: CPT

## 2017-08-15 PROCEDURE — C1773 RET DEV, INSERTABLE: HCPCS | Performed by: UROLOGY

## 2017-08-15 PROCEDURE — 0T9130Z DRAINAGE OF LEFT KIDNEY WITH DRAINAGE DEVICE, PERCUTANEOUS APPROACH: ICD-10-PCS | Performed by: UROLOGY

## 2017-08-15 PROCEDURE — 2500000003 HC RX 250 WO HCPCS: Performed by: NURSE ANESTHETIST, CERTIFIED REGISTERED

## 2017-08-15 PROCEDURE — 96368 THER/DIAG CONCURRENT INF: CPT

## 2017-08-15 PROCEDURE — 74000 XR ABDOMEN LIMITED (KUB): CPT

## 2017-08-15 PROCEDURE — 2580000003 HC RX 258: Performed by: NURSE ANESTHETIST, CERTIFIED REGISTERED

## 2017-08-15 PROCEDURE — 99999 PR OFFICE/OUTPT VISIT,PROCEDURE ONLY: CPT | Performed by: UROLOGY

## 2017-08-15 PROCEDURE — 2580000003 HC RX 258: Performed by: NURSE PRACTITIONER

## 2017-08-15 PROCEDURE — A4414 OST SKNBAR W/O CONV<=4 SQ IN: HCPCS | Performed by: UROLOGY

## 2017-08-15 PROCEDURE — 3600000004 HC SURGERY LEVEL 4 BASE: Performed by: UROLOGY

## 2017-08-15 PROCEDURE — A6258 TRANSPARENT FILM >16<=48 IN: HCPCS | Performed by: UROLOGY

## 2017-08-15 PROCEDURE — 7100000000 HC PACU RECOVERY - FIRST 15 MIN: Performed by: UROLOGY

## 2017-08-15 PROCEDURE — 0T778DZ DILATION OF LEFT URETER WITH INTRALUMINAL DEVICE, VIA NATURAL OR ARTIFICIAL OPENING ENDOSCOPIC: ICD-10-PCS | Performed by: UROLOGY

## 2017-08-15 PROCEDURE — BT1F1ZZ FLUOROSCOPY OF LEFT KIDNEY, URETER AND BLADDER USING LOW OSMOLAR CONTRAST: ICD-10-PCS | Performed by: UROLOGY

## 2017-08-15 PROCEDURE — 96374 THER/PROPH/DIAG INJ IV PUSH: CPT

## 2017-08-15 PROCEDURE — C1769 GUIDE WIRE: HCPCS

## 2017-08-15 PROCEDURE — 85610 PROTHROMBIN TIME: CPT

## 2017-08-15 PROCEDURE — 51701 INSERT BLADDER CATHETER: CPT

## 2017-08-15 PROCEDURE — 3700000000 HC ANESTHESIA ATTENDED CARE: Performed by: UROLOGY

## 2017-08-15 PROCEDURE — 82948 REAGENT STRIP/BLOOD GLUCOSE: CPT

## 2017-08-15 PROCEDURE — 2720000010 HC SURG SUPPLY STERILE: Performed by: UROLOGY

## 2017-08-15 RX ORDER — ONDANSETRON 2 MG/ML
4 INJECTION INTRAMUSCULAR; INTRAVENOUS EVERY 6 HOURS PRN
Status: DISCONTINUED | OUTPATIENT
Start: 2017-08-15 | End: 2017-08-15 | Stop reason: SDUPTHER

## 2017-08-15 RX ORDER — SODIUM CHLORIDE 0.9 % (FLUSH) 0.9 %
10 SYRINGE (ML) INJECTION PRN
Status: DISCONTINUED | OUTPATIENT
Start: 2017-08-15 | End: 2017-08-18 | Stop reason: HOSPADM

## 2017-08-15 RX ORDER — LABETALOL HYDROCHLORIDE 5 MG/ML
5 INJECTION, SOLUTION INTRAVENOUS EVERY 10 MIN PRN
Status: DISCONTINUED | OUTPATIENT
Start: 2017-08-15 | End: 2017-08-15 | Stop reason: HOSPADM

## 2017-08-15 RX ORDER — PROPOFOL 10 MG/ML
INJECTION, EMULSION INTRAVENOUS PRN
Status: DISCONTINUED | OUTPATIENT
Start: 2017-08-15 | End: 2017-08-15 | Stop reason: SDUPTHER

## 2017-08-15 RX ORDER — METOPROLOL TARTRATE 50 MG/1
50 TABLET, FILM COATED ORAL 2 TIMES DAILY
Status: DISCONTINUED | OUTPATIENT
Start: 2017-08-15 | End: 2017-08-18 | Stop reason: HOSPADM

## 2017-08-15 RX ORDER — MORPHINE SULFATE 2 MG/ML
2 INJECTION, SOLUTION INTRAMUSCULAR; INTRAVENOUS
Status: DISCONTINUED | OUTPATIENT
Start: 2017-08-15 | End: 2017-08-18 | Stop reason: HOSPADM

## 2017-08-15 RX ORDER — AMLODIPINE BESYLATE 10 MG/1
10 TABLET ORAL DAILY
Status: DISCONTINUED | OUTPATIENT
Start: 2017-08-15 | End: 2017-08-18 | Stop reason: HOSPADM

## 2017-08-15 RX ORDER — MEPERIDINE HYDROCHLORIDE 25 MG/ML
12.5 INJECTION INTRAMUSCULAR; INTRAVENOUS; SUBCUTANEOUS EVERY 5 MIN PRN
Status: DISCONTINUED | OUTPATIENT
Start: 2017-08-15 | End: 2017-08-15 | Stop reason: HOSPADM

## 2017-08-15 RX ORDER — FENTANYL CITRATE 50 UG/ML
25 INJECTION, SOLUTION INTRAMUSCULAR; INTRAVENOUS EVERY 5 MIN PRN
Status: DISCONTINUED | OUTPATIENT
Start: 2017-08-15 | End: 2017-08-15 | Stop reason: HOSPADM

## 2017-08-15 RX ORDER — OXYCODONE HYDROCHLORIDE AND ACETAMINOPHEN 5; 325 MG/1; MG/1
2 TABLET ORAL EVERY 4 HOURS PRN
Status: DISCONTINUED | OUTPATIENT
Start: 2017-08-15 | End: 2017-08-18 | Stop reason: HOSPADM

## 2017-08-15 RX ORDER — PANTOPRAZOLE SODIUM 40 MG/1
40 TABLET, DELAYED RELEASE ORAL 2 TIMES DAILY
Status: DISCONTINUED | OUTPATIENT
Start: 2017-08-15 | End: 2017-08-18 | Stop reason: HOSPADM

## 2017-08-15 RX ORDER — MORPHINE SULFATE 4 MG/ML
4 INJECTION, SOLUTION INTRAMUSCULAR; INTRAVENOUS
Status: DISCONTINUED | OUTPATIENT
Start: 2017-08-15 | End: 2017-08-15 | Stop reason: SDUPTHER

## 2017-08-15 RX ORDER — ACETAMINOPHEN 325 MG/1
650 TABLET ORAL EVERY 4 HOURS PRN
Status: DISCONTINUED | OUTPATIENT
Start: 2017-08-15 | End: 2017-08-18 | Stop reason: HOSPADM

## 2017-08-15 RX ORDER — MORPHINE SULFATE 2 MG/ML
2 INJECTION, SOLUTION INTRAMUSCULAR; INTRAVENOUS
Status: DISCONTINUED | OUTPATIENT
Start: 2017-08-15 | End: 2017-08-15 | Stop reason: SDUPTHER

## 2017-08-15 RX ORDER — OXYCODONE HYDROCHLORIDE AND ACETAMINOPHEN 5; 325 MG/1; MG/1
2 TABLET ORAL EVERY 4 HOURS PRN
Status: DISCONTINUED | OUTPATIENT
Start: 2017-08-15 | End: 2017-08-15 | Stop reason: SDUPTHER

## 2017-08-15 RX ORDER — SODIUM CHLORIDE 9 MG/ML
INJECTION, SOLUTION INTRAVENOUS CONTINUOUS
Status: DISCONTINUED | OUTPATIENT
Start: 2017-08-15 | End: 2017-08-18 | Stop reason: HOSPADM

## 2017-08-15 RX ORDER — AMITRIPTYLINE HYDROCHLORIDE 75 MG/1
75 TABLET, FILM COATED ORAL NIGHTLY
Status: DISCONTINUED | OUTPATIENT
Start: 2017-08-15 | End: 2017-08-18 | Stop reason: HOSPADM

## 2017-08-15 RX ORDER — GLYCOPYRROLATE 0.2 MG/ML
INJECTION INTRAMUSCULAR; INTRAVENOUS PRN
Status: DISCONTINUED | OUTPATIENT
Start: 2017-08-15 | End: 2017-08-15 | Stop reason: SDUPTHER

## 2017-08-15 RX ORDER — BISACODYL 10 MG
10 SUPPOSITORY, RECTAL RECTAL DAILY PRN
Status: DISCONTINUED | OUTPATIENT
Start: 2017-08-15 | End: 2017-08-18 | Stop reason: HOSPADM

## 2017-08-15 RX ORDER — DOCUSATE SODIUM 100 MG/1
100 CAPSULE, LIQUID FILLED ORAL 2 TIMES DAILY
Status: DISCONTINUED | OUTPATIENT
Start: 2017-08-15 | End: 2017-08-18 | Stop reason: HOSPADM

## 2017-08-15 RX ORDER — OXYCODONE HYDROCHLORIDE AND ACETAMINOPHEN 5; 325 MG/1; MG/1
1 TABLET ORAL EVERY 4 HOURS PRN
Status: DISCONTINUED | OUTPATIENT
Start: 2017-08-15 | End: 2017-08-18 | Stop reason: HOSPADM

## 2017-08-15 RX ORDER — GABAPENTIN 400 MG/1
800 CAPSULE ORAL 3 TIMES DAILY
Status: DISCONTINUED | OUTPATIENT
Start: 2017-08-15 | End: 2017-08-18 | Stop reason: HOSPADM

## 2017-08-15 RX ORDER — ONDANSETRON 2 MG/ML
4 INJECTION INTRAMUSCULAR; INTRAVENOUS EVERY 6 HOURS PRN
Status: DISCONTINUED | OUTPATIENT
Start: 2017-08-15 | End: 2017-08-18 | Stop reason: HOSPADM

## 2017-08-15 RX ORDER — MIDAZOLAM HYDROCHLORIDE 1 MG/ML
INJECTION INTRAMUSCULAR; INTRAVENOUS PRN
Status: DISCONTINUED | OUTPATIENT
Start: 2017-08-15 | End: 2017-08-15 | Stop reason: SDUPTHER

## 2017-08-15 RX ORDER — FENTANYL CITRATE 50 UG/ML
50 INJECTION, SOLUTION INTRAMUSCULAR; INTRAVENOUS EVERY 5 MIN PRN
Status: COMPLETED | OUTPATIENT
Start: 2017-08-15 | End: 2017-08-15

## 2017-08-15 RX ORDER — MORPHINE SULFATE 2 MG/ML
4 INJECTION, SOLUTION INTRAMUSCULAR; INTRAVENOUS
Status: DISCONTINUED | OUTPATIENT
Start: 2017-08-15 | End: 2017-08-18 | Stop reason: HOSPADM

## 2017-08-15 RX ORDER — ATROPA BELLADONNA AND OPIUM 16.2; 3 MG/1; MG/1
30 SUPPOSITORY RECTAL EVERY 8 HOURS PRN
Status: DISCONTINUED | OUTPATIENT
Start: 2017-08-15 | End: 2017-08-18 | Stop reason: HOSPADM

## 2017-08-15 RX ORDER — NEOSTIGMINE METHYLSULFATE 1 MG/ML
INJECTION, SOLUTION INTRAVENOUS PRN
Status: DISCONTINUED | OUTPATIENT
Start: 2017-08-15 | End: 2017-08-15 | Stop reason: SDUPTHER

## 2017-08-15 RX ORDER — SODIUM CHLORIDE 9 MG/ML
INJECTION, SOLUTION INTRAVENOUS CONTINUOUS PRN
Status: DISCONTINUED | OUTPATIENT
Start: 2017-08-15 | End: 2017-08-15 | Stop reason: SDUPTHER

## 2017-08-15 RX ORDER — MORPHINE SULFATE 10 MG/ML
INJECTION, SOLUTION INTRAMUSCULAR; INTRAVENOUS PRN
Status: DISCONTINUED | OUTPATIENT
Start: 2017-08-15 | End: 2017-08-15 | Stop reason: SDUPTHER

## 2017-08-15 RX ORDER — OXYBUTYNIN CHLORIDE 5 MG/1
5 TABLET ORAL EVERY 8 HOURS PRN
Status: DISCONTINUED | OUTPATIENT
Start: 2017-08-15 | End: 2017-08-18 | Stop reason: HOSPADM

## 2017-08-15 RX ORDER — CIPROFLOXACIN 2 MG/ML
INJECTION, SOLUTION INTRAVENOUS PRN
Status: DISCONTINUED | OUTPATIENT
Start: 2017-08-15 | End: 2017-08-15 | Stop reason: SDUPTHER

## 2017-08-15 RX ORDER — CIPROFLOXACIN 2 MG/ML
400 INJECTION, SOLUTION INTRAVENOUS ONCE
Status: DISCONTINUED | OUTPATIENT
Start: 2017-08-15 | End: 2017-08-18 | Stop reason: HOSPADM

## 2017-08-15 RX ORDER — PROMETHAZINE HYDROCHLORIDE 25 MG/ML
12.5 INJECTION, SOLUTION INTRAMUSCULAR; INTRAVENOUS
Status: COMPLETED | OUTPATIENT
Start: 2017-08-15 | End: 2017-08-15

## 2017-08-15 RX ORDER — TAMSULOSIN HYDROCHLORIDE 0.4 MG/1
0.4 CAPSULE ORAL DAILY
Status: DISCONTINUED | OUTPATIENT
Start: 2017-08-15 | End: 2017-08-18 | Stop reason: HOSPADM

## 2017-08-15 RX ORDER — SODIUM CHLORIDE 0.9 % (FLUSH) 0.9 %
10 SYRINGE (ML) INJECTION EVERY 12 HOURS SCHEDULED
Status: DISCONTINUED | OUTPATIENT
Start: 2017-08-15 | End: 2017-08-18 | Stop reason: HOSPADM

## 2017-08-15 RX ORDER — ROCURONIUM BROMIDE 10 MG/ML
INJECTION, SOLUTION INTRAVENOUS PRN
Status: DISCONTINUED | OUTPATIENT
Start: 2017-08-15 | End: 2017-08-15 | Stop reason: SDUPTHER

## 2017-08-15 RX ORDER — METOCLOPRAMIDE HYDROCHLORIDE 5 MG/ML
10 INJECTION INTRAMUSCULAR; INTRAVENOUS
Status: DISCONTINUED | OUTPATIENT
Start: 2017-08-15 | End: 2017-08-15 | Stop reason: HOSPADM

## 2017-08-15 RX ORDER — LORAZEPAM 2 MG/ML
0.5 INJECTION INTRAMUSCULAR ONCE
Status: COMPLETED | OUTPATIENT
Start: 2017-08-15 | End: 2017-08-15

## 2017-08-15 RX ORDER — DIPHENHYDRAMINE HYDROCHLORIDE 50 MG/ML
12.5 INJECTION INTRAMUSCULAR; INTRAVENOUS
Status: DISCONTINUED | OUTPATIENT
Start: 2017-08-15 | End: 2017-08-15 | Stop reason: HOSPADM

## 2017-08-15 RX ADMIN — CEFTRIAXONE 1 G: 1 INJECTION, POWDER, FOR SOLUTION INTRAMUSCULAR; INTRAVENOUS at 14:34

## 2017-08-15 RX ADMIN — HYDROMORPHONE HYDROCHLORIDE 0.5 MG: 1 INJECTION, SOLUTION INTRAMUSCULAR; INTRAVENOUS; SUBCUTANEOUS at 10:30

## 2017-08-15 RX ADMIN — MEPERIDINE HYDROCHLORIDE 12.5 MG: 25 INJECTION, SOLUTION INTRAMUSCULAR; INTRAVENOUS; SUBCUTANEOUS at 10:50

## 2017-08-15 RX ADMIN — MORPHINE SULFATE 4 MG: 2 INJECTION, SOLUTION INTRAMUSCULAR; INTRAVENOUS at 12:01

## 2017-08-15 RX ADMIN — FENTANYL CITRATE 50 MCG: 50 INJECTION INTRAMUSCULAR; INTRAVENOUS at 07:49

## 2017-08-15 RX ADMIN — GABAPENTIN 800 MG: 400 CAPSULE ORAL at 14:33

## 2017-08-15 RX ADMIN — ATROPA BELLADONNA AND OPIUM 30 MG: 16.2; 3 SUPPOSITORY RECTAL at 13:15

## 2017-08-15 RX ADMIN — FENTANYL CITRATE 50 MCG: 50 INJECTION INTRAMUSCULAR; INTRAVENOUS at 10:10

## 2017-08-15 RX ADMIN — FENTANYL CITRATE 50 MCG: 50 INJECTION INTRAMUSCULAR; INTRAVENOUS at 07:45

## 2017-08-15 RX ADMIN — NEOSTIGMINE METHYLSULFATE 3 MG: 1 INJECTION, SOLUTION INTRAVENOUS at 09:42

## 2017-08-15 RX ADMIN — OXYCODONE HYDROCHLORIDE AND ACETAMINOPHEN 2 TABLET: 5; 325 TABLET ORAL at 13:16

## 2017-08-15 RX ADMIN — LORAZEPAM 0.5 MG: 2 INJECTION INTRAMUSCULAR; INTRAVENOUS at 10:46

## 2017-08-15 RX ADMIN — CIPROFLOXACIN 400 MG: 2 INJECTION, SOLUTION INTRAVENOUS at 07:42

## 2017-08-15 RX ADMIN — AMITRIPTYLINE HYDROCHLORIDE 75 MG: 75 TABLET, FILM COATED ORAL at 21:36

## 2017-08-15 RX ADMIN — PANTOPRAZOLE SODIUM 40 MG: 40 TABLET, DELAYED RELEASE ORAL at 21:37

## 2017-08-15 RX ADMIN — TAMSULOSIN HYDROCHLORIDE 0.4 MG: 0.4 CAPSULE ORAL at 14:38

## 2017-08-15 RX ADMIN — MORPHINE SULFATE 10 MG: 10 INJECTION, SOLUTION INTRAMUSCULAR; INTRAVENOUS at 08:50

## 2017-08-15 RX ADMIN — PROMETHAZINE HYDROCHLORIDE 12.5 MG: 25 INJECTION INTRAMUSCULAR; INTRAVENOUS at 10:10

## 2017-08-15 RX ADMIN — MORPHINE SULFATE 2 MG: 2 INJECTION, SOLUTION INTRAMUSCULAR; INTRAVENOUS at 20:49

## 2017-08-15 RX ADMIN — HYDROMORPHONE HYDROCHLORIDE 0.5 MG: 1 INJECTION, SOLUTION INTRAMUSCULAR; INTRAVENOUS; SUBCUTANEOUS at 10:25

## 2017-08-15 RX ADMIN — MORPHINE SULFATE 4 MG: 2 INJECTION, SOLUTION INTRAMUSCULAR; INTRAVENOUS at 16:38

## 2017-08-15 RX ADMIN — GABAPENTIN 800 MG: 400 CAPSULE ORAL at 21:36

## 2017-08-15 RX ADMIN — PROPOFOL 150 MG: 10 INJECTION, EMULSION INTRAVENOUS at 07:49

## 2017-08-15 RX ADMIN — OXYCODONE HYDROCHLORIDE AND ACETAMINOPHEN 2 TABLET: 5; 325 TABLET ORAL at 22:38

## 2017-08-15 RX ADMIN — HYDROMORPHONE HYDROCHLORIDE 0.5 MG: 1 INJECTION, SOLUTION INTRAMUSCULAR; INTRAVENOUS; SUBCUTANEOUS at 10:40

## 2017-08-15 RX ADMIN — METOPROLOL TARTRATE 50 MG: 50 TABLET, FILM COATED ORAL at 21:37

## 2017-08-15 RX ADMIN — OXYCODONE HYDROCHLORIDE AND ACETAMINOPHEN 2 TABLET: 5; 325 TABLET ORAL at 18:21

## 2017-08-15 RX ADMIN — OXYBUTYNIN CHLORIDE 5 MG: 5 TABLET ORAL at 14:34

## 2017-08-15 RX ADMIN — FENTANYL CITRATE 50 MCG: 50 INJECTION INTRAMUSCULAR; INTRAVENOUS at 10:05

## 2017-08-15 RX ADMIN — FENTANYL CITRATE 50 MCG: 50 INJECTION INTRAMUSCULAR; INTRAVENOUS at 10:20

## 2017-08-15 RX ADMIN — ROCURONIUM BROMIDE 30 MG: 10 INJECTION INTRAVENOUS at 07:49

## 2017-08-15 RX ADMIN — MORPHINE SULFATE 4 MG: 2 INJECTION, SOLUTION INTRAMUSCULAR; INTRAVENOUS at 14:34

## 2017-08-15 RX ADMIN — LIDOCAINE HYDROCHLORIDE 45 MG: 20 INJECTION, SOLUTION INTRAVENOUS at 07:49

## 2017-08-15 RX ADMIN — PHENYLEPHRINE HYDROCHLORIDE 100 MCG: 10 INJECTION INTRAMUSCULAR; INTRAVENOUS; SUBCUTANEOUS at 07:58

## 2017-08-15 RX ADMIN — SODIUM CHLORIDE: 9 INJECTION, SOLUTION INTRAVENOUS at 07:25

## 2017-08-15 RX ADMIN — GLYCOPYRROLATE 0.4 MG: 0.2 INJECTION, SOLUTION INTRAMUSCULAR; INTRAVENOUS at 09:42

## 2017-08-15 RX ADMIN — SODIUM CHLORIDE: 9 INJECTION, SOLUTION INTRAVENOUS at 16:49

## 2017-08-15 RX ADMIN — PHENYLEPHRINE HYDROCHLORIDE 200 MCG: 10 INJECTION INTRAMUSCULAR; INTRAVENOUS; SUBCUTANEOUS at 08:01

## 2017-08-15 RX ADMIN — FENTANYL CITRATE 50 MCG: 50 INJECTION INTRAMUSCULAR; INTRAVENOUS at 10:15

## 2017-08-15 RX ADMIN — HYDROMORPHONE HYDROCHLORIDE 0.5 MG: 1 INJECTION, SOLUTION INTRAMUSCULAR; INTRAVENOUS; SUBCUTANEOUS at 10:35

## 2017-08-15 RX ADMIN — MIDAZOLAM 2 MG: 1 INJECTION INTRAMUSCULAR; INTRAVENOUS at 07:45

## 2017-08-15 RX ADMIN — SODIUM CHLORIDE: 9 INJECTION, SOLUTION INTRAVENOUS at 07:44

## 2017-08-15 RX ADMIN — ONDANSETRON 4 MG: 2 INJECTION INTRAMUSCULAR; INTRAVENOUS at 16:37

## 2017-08-15 ASSESSMENT — PULMONARY FUNCTION TESTS
PIF_VALUE: 18
PIF_VALUE: 15
PIF_VALUE: 18
PIF_VALUE: 17
PIF_VALUE: 18
PIF_VALUE: 16
PIF_VALUE: 19
PIF_VALUE: 18
PIF_VALUE: 18
PIF_VALUE: 15
PIF_VALUE: 17
PIF_VALUE: 18
PIF_VALUE: 15
PIF_VALUE: 18
PIF_VALUE: 17
PIF_VALUE: 17
PIF_VALUE: 18
PIF_VALUE: 18
PIF_VALUE: 15
PIF_VALUE: 15
PIF_VALUE: 1
PIF_VALUE: 18
PIF_VALUE: 18
PIF_VALUE: 14
PIF_VALUE: 15
PIF_VALUE: 18
PIF_VALUE: 16
PIF_VALUE: 18
PIF_VALUE: 17
PIF_VALUE: 18
PIF_VALUE: 19
PIF_VALUE: 18
PIF_VALUE: 15
PIF_VALUE: 18
PIF_VALUE: 19
PIF_VALUE: 18
PIF_VALUE: 18
PIF_VALUE: 19
PIF_VALUE: 17
PIF_VALUE: 18
PIF_VALUE: 18
PIF_VALUE: 17
PIF_VALUE: 18
PIF_VALUE: 15
PIF_VALUE: 16
PIF_VALUE: 16
PIF_VALUE: 17
PIF_VALUE: 17
PIF_VALUE: 19
PIF_VALUE: 18
PIF_VALUE: 18
PIF_VALUE: 19
PIF_VALUE: 17
PIF_VALUE: 18
PIF_VALUE: 15
PIF_VALUE: 18
PIF_VALUE: 18
PIF_VALUE: 22
PIF_VALUE: 3
PIF_VALUE: 17
PIF_VALUE: 18
PIF_VALUE: 18
PIF_VALUE: 17
PIF_VALUE: 18
PIF_VALUE: 15
PIF_VALUE: 18
PIF_VALUE: 18
PIF_VALUE: 19
PIF_VALUE: 18
PIF_VALUE: 17
PIF_VALUE: 19
PIF_VALUE: 16
PIF_VALUE: 18
PIF_VALUE: 15
PIF_VALUE: 18
PIF_VALUE: 18
PIF_VALUE: 19
PIF_VALUE: 18
PIF_VALUE: 17
PIF_VALUE: 18
PIF_VALUE: 15
PIF_VALUE: 17
PIF_VALUE: 17
PIF_VALUE: 18
PIF_VALUE: 18
PIF_VALUE: 2
PIF_VALUE: 17
PIF_VALUE: 18
PIF_VALUE: 15
PIF_VALUE: 17
PIF_VALUE: 2
PIF_VALUE: 11
PIF_VALUE: 17
PIF_VALUE: 19
PIF_VALUE: 18
PIF_VALUE: 2
PIF_VALUE: 18
PIF_VALUE: 9
PIF_VALUE: 2
PIF_VALUE: 18
PIF_VALUE: 16

## 2017-08-15 ASSESSMENT — PAIN SCALES - GENERAL
PAINLEVEL_OUTOF10: 8
PAINLEVEL_OUTOF10: 10
PAINLEVEL_OUTOF10: 7
PAINLEVEL_OUTOF10: 8
PAINLEVEL_OUTOF10: 10
PAINLEVEL_OUTOF10: 8
PAINLEVEL_OUTOF10: 10
PAINLEVEL_OUTOF10: 9
PAINLEVEL_OUTOF10: 7
PAINLEVEL_OUTOF10: 8
PAINLEVEL_OUTOF10: 10
PAINLEVEL_OUTOF10: 7
PAINLEVEL_OUTOF10: 10
PAINLEVEL_OUTOF10: 0
PAINLEVEL_OUTOF10: 10
PAINLEVEL_OUTOF10: 5
PAINLEVEL_OUTOF10: 10

## 2017-08-15 ASSESSMENT — PAIN DESCRIPTION - PROGRESSION
CLINICAL_PROGRESSION: GRADUALLY WORSENING
CLINICAL_PROGRESSION: GRADUALLY IMPROVING
CLINICAL_PROGRESSION: NOT CHANGED
CLINICAL_PROGRESSION: GRADUALLY WORSENING
CLINICAL_PROGRESSION: NOT CHANGED

## 2017-08-15 ASSESSMENT — PAIN DESCRIPTION - ONSET
ONSET: ON-GOING

## 2017-08-15 ASSESSMENT — PAIN DESCRIPTION - PAIN TYPE
TYPE: SURGICAL PAIN
TYPE: SURGICAL PAIN
TYPE: CHRONIC PAIN
TYPE: SURGICAL PAIN

## 2017-08-15 ASSESSMENT — PAIN DESCRIPTION - ORIENTATION
ORIENTATION: RIGHT;LEFT
ORIENTATION: RIGHT;LEFT;LOWER
ORIENTATION: LEFT;LOWER
ORIENTATION: LEFT
ORIENTATION: RIGHT;LEFT;LOWER

## 2017-08-15 ASSESSMENT — PAIN DESCRIPTION - FREQUENCY
FREQUENCY: CONTINUOUS

## 2017-08-15 ASSESSMENT — PAIN DESCRIPTION - LOCATION
LOCATION: FLANK
LOCATION: ABDOMEN;FLANK
LOCATION: BACK
LOCATION: ABDOMEN;FLANK
LOCATION: FLANK
LOCATION: GENERALIZED

## 2017-08-15 ASSESSMENT — PAIN DESCRIPTION - DESCRIPTORS
DESCRIPTORS: CONSTANT;DISCOMFORT
DESCRIPTORS: CONSTANT;DISCOMFORT;SHARP;SHOOTING
DESCRIPTORS: CONSTANT;DISCOMFORT;SHARP;SHOOTING
DESCRIPTORS: ACHING;DISCOMFORT

## 2017-08-15 ASSESSMENT — PAIN SCALES - WONG BAKER: WONGBAKER_NUMERICALRESPONSE: 0

## 2017-08-15 ASSESSMENT — ENCOUNTER SYMPTOMS: SHORTNESS OF BREATH: 1

## 2017-08-16 ENCOUNTER — APPOINTMENT (OUTPATIENT)
Dept: INTERVENTIONAL RADIOLOGY/VASCULAR | Age: 47
DRG: 669 | End: 2017-08-16
Attending: UROLOGY
Payer: MEDICARE

## 2017-08-16 LAB
ANION GAP SERPL CALCULATED.3IONS-SCNC: 12 MEQ/L (ref 8–16)
BUN BLDV-MCNC: 10 MG/DL (ref 7–22)
CALCIUM SERPL-MCNC: 8 MG/DL (ref 8.5–10.5)
CHLORIDE BLD-SCNC: 109 MEQ/L (ref 98–111)
CO2: 22 MEQ/L (ref 23–33)
CREAT SERPL-MCNC: 1.1 MG/DL (ref 0.4–1.2)
GFR SERPL CREATININE-BSD FRML MDRD: 72 ML/MIN/1.73M2
GLUCOSE BLD-MCNC: 90 MG/DL (ref 70–108)
HCT VFR BLD CALC: 33.6 % (ref 42–52)
HEMOGLOBIN: 10.9 GM/DL (ref 14–18)
MCH RBC QN AUTO: 26.3 PG (ref 27–31)
MCHC RBC AUTO-ENTMCNC: 32.5 GM/DL (ref 33–37)
MCV RBC AUTO: 81 FL (ref 80–94)
PDW BLD-RTO: 16.2 % (ref 11.5–14.5)
PLATELET # BLD: 279 THOU/MM3 (ref 130–400)
PMV BLD AUTO: 8.1 MCM (ref 7.4–10.4)
POTASSIUM SERPL-SCNC: 4.3 MEQ/L (ref 3.5–5.2)
RBC # BLD: 4.15 MILL/MM3 (ref 4.7–6.1)
SODIUM BLD-SCNC: 143 MEQ/L (ref 135–145)
WBC # BLD: 8.5 THOU/MM3 (ref 4.8–10.8)

## 2017-08-16 PROCEDURE — 96366 THER/PROPH/DIAG IV INF ADDON: CPT

## 2017-08-16 PROCEDURE — 85027 COMPLETE CBC AUTOMATED: CPT

## 2017-08-16 PROCEDURE — 6360000002 HC RX W HCPCS: Performed by: NURSE PRACTITIONER

## 2017-08-16 PROCEDURE — 96376 TX/PRO/DX INJ SAME DRUG ADON: CPT

## 2017-08-16 PROCEDURE — 6370000000 HC RX 637 (ALT 250 FOR IP): Performed by: NURSE PRACTITIONER

## 2017-08-16 PROCEDURE — 80048 BASIC METABOLIC PNL TOTAL CA: CPT

## 2017-08-16 PROCEDURE — 2580000003 HC RX 258: Performed by: NURSE PRACTITIONER

## 2017-08-16 PROCEDURE — 2580000003 HC RX 258: Performed by: UROLOGY

## 2017-08-16 PROCEDURE — 99024 POSTOP FOLLOW-UP VISIT: CPT | Performed by: NURSE PRACTITIONER

## 2017-08-16 PROCEDURE — 36415 COLL VENOUS BLD VENIPUNCTURE: CPT

## 2017-08-16 RX ADMIN — SODIUM CHLORIDE: 9 INJECTION, SOLUTION INTRAVENOUS at 11:22

## 2017-08-16 RX ADMIN — OXYBUTYNIN CHLORIDE 5 MG: 5 TABLET ORAL at 10:23

## 2017-08-16 RX ADMIN — OXYCODONE HYDROCHLORIDE AND ACETAMINOPHEN 2 TABLET: 5; 325 TABLET ORAL at 19:30

## 2017-08-16 RX ADMIN — GABAPENTIN 800 MG: 400 CAPSULE ORAL at 08:59

## 2017-08-16 RX ADMIN — TAMSULOSIN HYDROCHLORIDE 0.4 MG: 0.4 CAPSULE ORAL at 09:01

## 2017-08-16 RX ADMIN — CEFTRIAXONE 1 G: 1 INJECTION, POWDER, FOR SOLUTION INTRAMUSCULAR; INTRAVENOUS at 13:03

## 2017-08-16 RX ADMIN — MORPHINE SULFATE 4 MG: 2 INJECTION, SOLUTION INTRAMUSCULAR; INTRAVENOUS at 04:29

## 2017-08-16 RX ADMIN — DOCUSATE SODIUM 100 MG: 100 CAPSULE ORAL at 20:56

## 2017-08-16 RX ADMIN — GABAPENTIN 800 MG: 400 CAPSULE ORAL at 20:58

## 2017-08-16 RX ADMIN — OXYCODONE HYDROCHLORIDE AND ACETAMINOPHEN 2 TABLET: 5; 325 TABLET ORAL at 14:35

## 2017-08-16 RX ADMIN — OXYCODONE HYDROCHLORIDE AND ACETAMINOPHEN 2 TABLET: 5; 325 TABLET ORAL at 10:28

## 2017-08-16 RX ADMIN — PANTOPRAZOLE SODIUM 40 MG: 40 TABLET, DELAYED RELEASE ORAL at 20:59

## 2017-08-16 RX ADMIN — MORPHINE SULFATE 4 MG: 2 INJECTION, SOLUTION INTRAMUSCULAR; INTRAVENOUS at 20:57

## 2017-08-16 RX ADMIN — PANTOPRAZOLE SODIUM 40 MG: 40 TABLET, DELAYED RELEASE ORAL at 09:01

## 2017-08-16 RX ADMIN — MORPHINE SULFATE 4 MG: 2 INJECTION, SOLUTION INTRAMUSCULAR; INTRAVENOUS at 22:59

## 2017-08-16 RX ADMIN — OXYCODONE HYDROCHLORIDE AND ACETAMINOPHEN 2 TABLET: 5; 325 TABLET ORAL at 06:12

## 2017-08-16 RX ADMIN — SODIUM CHLORIDE: 9 INJECTION, SOLUTION INTRAVENOUS at 21:49

## 2017-08-16 RX ADMIN — METOPROLOL TARTRATE 50 MG: 50 TABLET, FILM COATED ORAL at 20:58

## 2017-08-16 RX ADMIN — MORPHINE SULFATE 4 MG: 2 INJECTION, SOLUTION INTRAMUSCULAR; INTRAVENOUS at 15:49

## 2017-08-16 RX ADMIN — MORPHINE SULFATE 4 MG: 2 INJECTION, SOLUTION INTRAMUSCULAR; INTRAVENOUS at 13:05

## 2017-08-16 RX ADMIN — GABAPENTIN 800 MG: 400 CAPSULE ORAL at 14:35

## 2017-08-16 RX ADMIN — SODIUM CHLORIDE: 9 INJECTION, SOLUTION INTRAVENOUS at 02:20

## 2017-08-16 RX ADMIN — MORPHINE SULFATE 4 MG: 2 INJECTION, SOLUTION INTRAMUSCULAR; INTRAVENOUS at 09:05

## 2017-08-16 RX ADMIN — ONDANSETRON 4 MG: 2 INJECTION INTRAMUSCULAR; INTRAVENOUS at 09:05

## 2017-08-16 RX ADMIN — AMITRIPTYLINE HYDROCHLORIDE 75 MG: 75 TABLET, FILM COATED ORAL at 20:58

## 2017-08-16 RX ADMIN — MORPHINE SULFATE 4 MG: 2 INJECTION, SOLUTION INTRAMUSCULAR; INTRAVENOUS at 17:55

## 2017-08-16 ASSESSMENT — PAIN DESCRIPTION - LOCATION
LOCATION: ABDOMEN;FLANK
LOCATION: FLANK
LOCATION: ABDOMEN;FLANK
LOCATION: FLANK

## 2017-08-16 ASSESSMENT — PAIN SCALES - GENERAL
PAINLEVEL_OUTOF10: 10
PAINLEVEL_OUTOF10: 7
PAINLEVEL_OUTOF10: 8
PAINLEVEL_OUTOF10: 9
PAINLEVEL_OUTOF10: 9
PAINLEVEL_OUTOF10: 10
PAINLEVEL_OUTOF10: 8
PAINLEVEL_OUTOF10: 7
PAINLEVEL_OUTOF10: 7
PAINLEVEL_OUTOF10: 9
PAINLEVEL_OUTOF10: 7
PAINLEVEL_OUTOF10: 9
PAINLEVEL_OUTOF10: 9
PAINLEVEL_OUTOF10: 6
PAINLEVEL_OUTOF10: 10
PAINLEVEL_OUTOF10: 9
PAINLEVEL_OUTOF10: 7
PAINLEVEL_OUTOF10: 10

## 2017-08-16 ASSESSMENT — PAIN DESCRIPTION - ORIENTATION
ORIENTATION: LEFT

## 2017-08-16 ASSESSMENT — PAIN DESCRIPTION - PAIN TYPE
TYPE: SURGICAL PAIN

## 2017-08-17 ENCOUNTER — APPOINTMENT (OUTPATIENT)
Dept: INTERVENTIONAL RADIOLOGY/VASCULAR | Age: 47
DRG: 669 | End: 2017-08-17
Attending: UROLOGY
Payer: MEDICARE

## 2017-08-17 LAB
ANION GAP SERPL CALCULATED.3IONS-SCNC: 8 MEQ/L (ref 8–16)
ANISOCYTOSIS: ABNORMAL
BASOPHILS # BLD: 0.6 %
BASOPHILS ABSOLUTE: 0.1 THOU/MM3 (ref 0–0.1)
BUN BLDV-MCNC: 7 MG/DL (ref 7–22)
CALCIUM SERPL-MCNC: 8.2 MG/DL (ref 8.5–10.5)
CHLORIDE BLD-SCNC: 108 MEQ/L (ref 98–111)
CO2: 24 MEQ/L (ref 23–33)
CREAT SERPL-MCNC: 0.9 MG/DL (ref 0.4–1.2)
EOSINOPHIL # BLD: 1.7 %
EOSINOPHILS ABSOLUTE: 0.1 THOU/MM3 (ref 0–0.4)
GFR SERPL CREATININE-BSD FRML MDRD: > 90 ML/MIN/1.73M2
GLUCOSE BLD-MCNC: 118 MG/DL (ref 70–108)
HCT VFR BLD CALC: 30.9 % (ref 42–52)
HEMOGLOBIN: 9.9 GM/DL (ref 14–18)
HYPOCHROMIA: ABNORMAL
LYMPHOCYTES # BLD: 19.4 %
LYMPHOCYTES ABSOLUTE: 1.7 THOU/MM3 (ref 1–4.8)
MCH RBC QN AUTO: 25.6 PG (ref 27–31)
MCHC RBC AUTO-ENTMCNC: 32 GM/DL (ref 33–37)
MCV RBC AUTO: 80 FL (ref 80–94)
MICROCYTES: ABNORMAL
MONOCYTES # BLD: 11.1 %
MONOCYTES ABSOLUTE: 1 THOU/MM3 (ref 0.4–1.3)
NUCLEATED RED BLOOD CELLS: 0 /100 WBC
PDW BLD-RTO: 16.3 % (ref 11.5–14.5)
PLATELET # BLD: 258 THOU/MM3 (ref 130–400)
PMV BLD AUTO: 7.9 MCM (ref 7.4–10.4)
POTASSIUM SERPL-SCNC: 4.2 MEQ/L (ref 3.5–5.2)
RBC # BLD: 3.86 MILL/MM3 (ref 4.7–6.1)
RBC # BLD: NORMAL 10*6/UL
SEG NEUTROPHILS: 67.2 %
SEGMENTED NEUTROPHILS ABSOLUTE COUNT: 5.9 THOU/MM3 (ref 1.8–7.7)
SODIUM BLD-SCNC: 140 MEQ/L (ref 135–145)
WBC # BLD: 8.8 THOU/MM3 (ref 4.8–10.8)

## 2017-08-17 PROCEDURE — A6258 TRANSPARENT FILM >16<=48 IN: HCPCS

## 2017-08-17 PROCEDURE — 99999 PR OFFICE/OUTPT VISIT,PROCEDURE ONLY: CPT | Performed by: NURSE PRACTITIONER

## 2017-08-17 PROCEDURE — 85025 COMPLETE CBC W/AUTO DIFF WBC: CPT

## 2017-08-17 PROCEDURE — 51798 US URINE CAPACITY MEASURE: CPT

## 2017-08-17 PROCEDURE — 99024 POSTOP FOLLOW-UP VISIT: CPT | Performed by: NURSE PRACTITIONER

## 2017-08-17 PROCEDURE — 6360000004 HC RX CONTRAST MEDICATION: Performed by: RADIOLOGY

## 2017-08-17 PROCEDURE — 6360000002 HC RX W HCPCS: Performed by: NURSE PRACTITIONER

## 2017-08-17 PROCEDURE — 6370000000 HC RX 637 (ALT 250 FOR IP): Performed by: NURSE PRACTITIONER

## 2017-08-17 PROCEDURE — 36415 COLL VENOUS BLD VENIPUNCTURE: CPT

## 2017-08-17 PROCEDURE — 2580000003 HC RX 258: Performed by: NURSE PRACTITIONER

## 2017-08-17 PROCEDURE — 2500000003 HC RX 250 WO HCPCS

## 2017-08-17 PROCEDURE — 1200000000 HC SEMI PRIVATE

## 2017-08-17 PROCEDURE — 6370000000 HC RX 637 (ALT 250 FOR IP)

## 2017-08-17 PROCEDURE — 0TP570Z REMOVAL OF DRAINAGE DEVICE FROM KIDNEY, VIA NATURAL OR ARTIFICIAL OPENING: ICD-10-PCS | Performed by: RADIOLOGY

## 2017-08-17 PROCEDURE — 6370000000 HC RX 637 (ALT 250 FOR IP): Performed by: RADIOLOGY

## 2017-08-17 PROCEDURE — 50389 REMOVE RENAL TUBE W/FLUORO: CPT | Performed by: RADIOLOGY

## 2017-08-17 PROCEDURE — 2580000003 HC RX 258: Performed by: UROLOGY

## 2017-08-17 PROCEDURE — 80048 BASIC METABOLIC PNL TOTAL CA: CPT

## 2017-08-17 RX ORDER — SENNA AND DOCUSATE SODIUM 50; 8.6 MG/1; MG/1
2 TABLET, FILM COATED ORAL 2 TIMES DAILY PRN
Status: DISCONTINUED | OUTPATIENT
Start: 2017-08-17 | End: 2017-08-18 | Stop reason: HOSPADM

## 2017-08-17 RX ORDER — DIAZEPAM 5 MG/1
5 TABLET ORAL EVERY 6 HOURS PRN
Status: DISCONTINUED | OUTPATIENT
Start: 2017-08-17 | End: 2017-08-18 | Stop reason: HOSPADM

## 2017-08-17 RX ORDER — IBUPROFEN 200 MG
TABLET ORAL ONCE
Status: COMPLETED | OUTPATIENT
Start: 2017-08-17 | End: 2017-08-17

## 2017-08-17 RX ORDER — SULFAMETHOXAZOLE AND TRIMETHOPRIM 800; 160 MG/1; MG/1
1 TABLET ORAL 2 TIMES DAILY
Qty: 10 TABLET | Refills: 0 | Status: SHIPPED | OUTPATIENT
Start: 2017-08-17 | End: 2017-08-22

## 2017-08-17 RX ADMIN — BACITRACIN, NEOMYCIN, POLYMYXIN B 3.5 G: 400; 3.5; 5 OINTMENT TOPICAL at 14:40

## 2017-08-17 RX ADMIN — GABAPENTIN 800 MG: 400 CAPSULE ORAL at 15:26

## 2017-08-17 RX ADMIN — MORPHINE SULFATE 4 MG: 2 INJECTION, SOLUTION INTRAMUSCULAR; INTRAVENOUS at 11:02

## 2017-08-17 RX ADMIN — DIAZEPAM 5 MG: 5 TABLET ORAL at 23:18

## 2017-08-17 RX ADMIN — METOPROLOL TARTRATE 50 MG: 50 TABLET, FILM COATED ORAL at 23:20

## 2017-08-17 RX ADMIN — METOPROLOL TARTRATE 50 MG: 50 TABLET, FILM COATED ORAL at 08:22

## 2017-08-17 RX ADMIN — OXYCODONE HYDROCHLORIDE AND ACETAMINOPHEN 2 TABLET: 5; 325 TABLET ORAL at 18:05

## 2017-08-17 RX ADMIN — DOCUSATE SODIUM 100 MG: 100 CAPSULE ORAL at 23:19

## 2017-08-17 RX ADMIN — GABAPENTIN 800 MG: 400 CAPSULE ORAL at 08:22

## 2017-08-17 RX ADMIN — MORPHINE SULFATE 4 MG: 2 INJECTION, SOLUTION INTRAMUSCULAR; INTRAVENOUS at 08:53

## 2017-08-17 RX ADMIN — AMLODIPINE BESYLATE 10 MG: 10 TABLET ORAL at 08:21

## 2017-08-17 RX ADMIN — OXYCODONE HYDROCHLORIDE AND ACETAMINOPHEN 2 TABLET: 5; 325 TABLET ORAL at 00:25

## 2017-08-17 RX ADMIN — PANTOPRAZOLE SODIUM 40 MG: 40 TABLET, DELAYED RELEASE ORAL at 23:20

## 2017-08-17 RX ADMIN — OXYCODONE HYDROCHLORIDE AND ACETAMINOPHEN 2 TABLET: 5; 325 TABLET ORAL at 21:56

## 2017-08-17 RX ADMIN — DOCUSATE SODIUM 100 MG: 100 CAPSULE ORAL at 08:21

## 2017-08-17 RX ADMIN — PANTOPRAZOLE SODIUM 40 MG: 40 TABLET, DELAYED RELEASE ORAL at 08:22

## 2017-08-17 RX ADMIN — MORPHINE SULFATE 4 MG: 2 INJECTION, SOLUTION INTRAMUSCULAR; INTRAVENOUS at 06:32

## 2017-08-17 RX ADMIN — OXYCODONE HYDROCHLORIDE AND ACETAMINOPHEN 2 TABLET: 5; 325 TABLET ORAL at 12:16

## 2017-08-17 RX ADMIN — IOTHALAMATE MEGLUMINE 10 ML: 600 INJECTION INTRAVASCULAR at 14:40

## 2017-08-17 RX ADMIN — GABAPENTIN 800 MG: 400 CAPSULE ORAL at 23:19

## 2017-08-17 RX ADMIN — AMITRIPTYLINE HYDROCHLORIDE 75 MG: 75 TABLET, FILM COATED ORAL at 23:18

## 2017-08-17 RX ADMIN — OXYBUTYNIN CHLORIDE 5 MG: 5 TABLET ORAL at 08:02

## 2017-08-17 RX ADMIN — MORPHINE SULFATE 4 MG: 2 INJECTION, SOLUTION INTRAMUSCULAR; INTRAVENOUS at 23:13

## 2017-08-17 RX ADMIN — TAMSULOSIN HYDROCHLORIDE 0.4 MG: 0.4 CAPSULE ORAL at 08:21

## 2017-08-17 RX ADMIN — MORPHINE SULFATE 4 MG: 2 INJECTION, SOLUTION INTRAMUSCULAR; INTRAVENOUS at 14:05

## 2017-08-17 RX ADMIN — CEFTRIAXONE 1 G: 1 INJECTION, POWDER, FOR SOLUTION INTRAMUSCULAR; INTRAVENOUS at 15:30

## 2017-08-17 RX ADMIN — SODIUM CHLORIDE: 9 INJECTION, SOLUTION INTRAVENOUS at 07:56

## 2017-08-17 RX ADMIN — OXYCODONE HYDROCHLORIDE AND ACETAMINOPHEN 2 TABLET: 5; 325 TABLET ORAL at 07:56

## 2017-08-17 ASSESSMENT — PAIN DESCRIPTION - ORIENTATION
ORIENTATION: LEFT

## 2017-08-17 ASSESSMENT — PAIN DESCRIPTION - PAIN TYPE
TYPE: SURGICAL PAIN

## 2017-08-17 ASSESSMENT — PAIN SCALES - GENERAL
PAINLEVEL_OUTOF10: 0
PAINLEVEL_OUTOF10: 10
PAINLEVEL_OUTOF10: 8
PAINLEVEL_OUTOF10: 10
PAINLEVEL_OUTOF10: 7
PAINLEVEL_OUTOF10: 8
PAINLEVEL_OUTOF10: 6
PAINLEVEL_OUTOF10: 10
PAINLEVEL_OUTOF10: 10
PAINLEVEL_OUTOF10: 8
PAINLEVEL_OUTOF10: 8
PAINLEVEL_OUTOF10: 5
PAINLEVEL_OUTOF10: 8
PAINLEVEL_OUTOF10: 10
PAINLEVEL_OUTOF10: 8
PAINLEVEL_OUTOF10: 7

## 2017-08-17 ASSESSMENT — PAIN DESCRIPTION - DESCRIPTORS
DESCRIPTORS: SHARP

## 2017-08-17 ASSESSMENT — PAIN DESCRIPTION - LOCATION
LOCATION: FLANK

## 2017-08-18 VITALS
RESPIRATION RATE: 16 BRPM | DIASTOLIC BLOOD PRESSURE: 62 MMHG | HEIGHT: 70 IN | BODY MASS INDEX: 20.76 KG/M2 | WEIGHT: 145 LBS | SYSTOLIC BLOOD PRESSURE: 128 MMHG | OXYGEN SATURATION: 92 % | TEMPERATURE: 98.6 F | HEART RATE: 72 BPM

## 2017-08-18 LAB — STONE ANALYSIS: NORMAL

## 2017-08-18 PROCEDURE — 2580000003 HC RX 258: Performed by: NURSE PRACTITIONER

## 2017-08-18 PROCEDURE — 6370000000 HC RX 637 (ALT 250 FOR IP): Performed by: NURSE PRACTITIONER

## 2017-08-18 PROCEDURE — 6360000002 HC RX W HCPCS: Performed by: NURSE PRACTITIONER

## 2017-08-18 PROCEDURE — 51702 INSERT TEMP BLADDER CATH: CPT

## 2017-08-18 RX ADMIN — OXYCODONE HYDROCHLORIDE AND ACETAMINOPHEN 2 TABLET: 5; 325 TABLET ORAL at 06:05

## 2017-08-18 RX ADMIN — METOPROLOL TARTRATE 50 MG: 50 TABLET, FILM COATED ORAL at 11:32

## 2017-08-18 RX ADMIN — MORPHINE SULFATE 4 MG: 2 INJECTION, SOLUTION INTRAMUSCULAR; INTRAVENOUS at 11:33

## 2017-08-18 RX ADMIN — OXYCODONE HYDROCHLORIDE AND ACETAMINOPHEN 2 TABLET: 5; 325 TABLET ORAL at 12:29

## 2017-08-18 RX ADMIN — Medication 10 ML: at 11:33

## 2017-08-18 RX ADMIN — AMLODIPINE BESYLATE 10 MG: 10 TABLET ORAL at 11:31

## 2017-08-18 RX ADMIN — TAMSULOSIN HYDROCHLORIDE 0.4 MG: 0.4 CAPSULE ORAL at 11:31

## 2017-08-18 RX ADMIN — GABAPENTIN 800 MG: 400 CAPSULE ORAL at 11:32

## 2017-08-18 RX ADMIN — PANTOPRAZOLE SODIUM 40 MG: 40 TABLET, DELAYED RELEASE ORAL at 11:33

## 2017-08-18 RX ADMIN — DOCUSATE SODIUM 100 MG: 100 CAPSULE ORAL at 11:31

## 2017-08-18 ASSESSMENT — PAIN SCALES - GENERAL
PAINLEVEL_OUTOF10: 5
PAINLEVEL_OUTOF10: 8
PAINLEVEL_OUTOF10: 9
PAINLEVEL_OUTOF10: 7
PAINLEVEL_OUTOF10: 0
PAINLEVEL_OUTOF10: 5
PAINLEVEL_OUTOF10: 7

## 2017-08-18 ASSESSMENT — PAIN DESCRIPTION - PAIN TYPE
TYPE: SURGICAL PAIN

## 2017-08-18 ASSESSMENT — PAIN DESCRIPTION - LOCATION
LOCATION: FLANK

## 2017-08-18 ASSESSMENT — PAIN DESCRIPTION - ORIENTATION
ORIENTATION: LEFT

## 2017-08-19 ENCOUNTER — CARE COORDINATION (OUTPATIENT)
Dept: CASE MANAGEMENT | Age: 47
End: 2017-08-19

## 2017-08-21 ENCOUNTER — TELEPHONE (OUTPATIENT)
Dept: PHARMACY | Facility: CLINIC | Age: 47
End: 2017-08-21

## 2017-08-31 ENCOUNTER — PROCEDURE VISIT (OUTPATIENT)
Dept: UROLOGY | Age: 47
End: 2017-08-31
Payer: MEDICARE

## 2017-08-31 VITALS — WEIGHT: 145 LBS | BODY MASS INDEX: 20.81 KG/M2 | DIASTOLIC BLOOD PRESSURE: 88 MMHG | SYSTOLIC BLOOD PRESSURE: 152 MMHG

## 2017-08-31 DIAGNOSIS — N20.1 URETERAL STONE: ICD-10-CM

## 2017-08-31 PROCEDURE — 51700 IRRIGATION OF BLADDER: CPT | Performed by: UROLOGY

## 2017-08-31 PROCEDURE — 99024 POSTOP FOLLOW-UP VISIT: CPT | Performed by: UROLOGY

## 2017-08-31 RX ORDER — OXYBUTYNIN CHLORIDE 5 MG/1
5 TABLET ORAL 3 TIMES DAILY
COMMUNITY
End: 2018-08-29 | Stop reason: SDUPTHER

## 2017-08-31 ASSESSMENT — ENCOUNTER SYMPTOMS
SHORTNESS OF BREATH: 0
CHOKING: 0
CONSTIPATION: 0
DIARRHEA: 0
EYE REDNESS: 0
EYE ITCHING: 0

## 2017-08-31 NOTE — PROGRESS NOTES
With catheter in place 200 cc water instilled into bladder. Balloon deflated, catheter removed without difficulty. Pt then voided 200 cc.

## 2017-08-31 NOTE — MR AVS SNAPSHOT
After Visit Summary             Jhoan Enriquez.   2017 8:30 AM   Procedure visit    Description:  Male : 1970   Provider:  Javier Finley MD   Department:  [facility]              Your Follow-Up and Future Appointments         Below is a list of your follow-up and future appointments. This may not be a complete list as you may have made appointments directly with providers that we are not aware of or your providers may have made some for you. Please call your providers to confirm appointments. It is important to keep your appointments. Please bring your current insurance card, photo ID, co-pay, and all medication bottles to your appointment. If self-pay, payment is expected at the time of service. Your To-Do List     Future Appointments Provider Department Dept Phone    10/2/2017 9:45 AM MD [facility] [phone]         Information from Your Visit        Department     Name Address Phone [facility] [address] [address] [facility].  [facility]      You Were Seen for:         Comments    Ureteral stone   [[id number]]         Vital Signs     Blood Pressure Weight Body Mass Index Smoking Status          152/88 145 lb (65.8 kg) 20.81 kg/m2 Former Smoker           Medications and Orders      Your Current Medications Are              oxybutynin (DITROPAN) 5 MG tablet Take 5 mg by mouth 3 times daily    metoprolol tartrate (LOPRESSOR) 50 MG tablet Take 1 tablet by mouth 2 times daily . hold if SBP less than 100 mm hg or HR less than 60    amLODIPine (NORVASC) 10 MG tablet Take 1 tablet by mouth daily . hold if SBP less than 100 mm hg    traMADol (ULTRAM) 50 MG tablet Take by mouth every 6 hours as needed for Pain 1-2 tab    oxyCODONE-acetaminophen (PERCOCET)  MG per tablet Take 1 tablet by mouth every 8 hours as needed for Pain .    gabapentin (NEURONTIN) 800 MG tablet Take 800 mg by mouth 3 times daily amitriptyline (ELAVIL) 75 MG tablet Take 75 mg by mouth nightly    fentaNYL (DURAGESIC) 25 MCG/HR Place 1 patch onto the skin every 72 hours . 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    Impaired mobility and ADLs - due to previous spine tumor./ surgeries    UTI (urinary tract infection)    Shortness of breath    Fatigue    Left nephrolithiasis    Abdominal pain, lower    Dizziness    GERD (gastroesophageal reflux disease) (Chronic)    Lower paraplegia (HCC) (Chronic)    Chondromatosis (Chronic)      Immunizations as of 8/31/2017     Name Date    Influenza Virus Vaccine 12/1/2015, 10/11/2013, 12/4/2012    Influenza Whole 9/26/2011    Influenza, High Dose 10/1/2009    Influenza, Quadrivalent, 3 Years and above,IM 11/1/2016      Preventive Care        Date Due    HIV screening is recommended for all people regardless of risk factors  aged 15-65 years at least once (lifetime) who have never been HIV tested. 1/23/1985    Tetanus Combination Vaccine (1 - Tdap) 1/23/1989    Yearly Flu Vaccine (1) 8/1/2017    Cholesterol Screening 5/31/2022            Scyron Signup           Scyron allows you to send messages to your doctor, view your test results, renew your prescriptions, schedule appointments, view visit notes, and more. How Do I Sign Up? 1. In your Internet browser, go to [email or web address] 2. Click on the Sign Up Now link in the Sign In box. You will see the New Member Sign Up page. 3. Enter your Housebites Access Code exactly as it appears below. You will not need to use this code after youve completed the sign-up process. If you do not sign up before the expiration date, you must request a new code. Housebites Access Code: Miguel Pittman  Expires: 10/16/2017  2:30 PM    4. Enter your Social Security Number ([id number]) and Date of Birth (mm/dd/yyyy) as indicated and click Submit. You will be taken to the next sign-up page. 5. Create a Housebites ID. This will be your Housebites login ID and cannot be changed, so think of one that is secure and easy to remember. 6. Create a Housebites password. You can change your password at any time. 7. Enter your Password Reset Question and Answer. This can be used at a later time if you forget your password. 8. Enter your e-mail address. You will receive e-mail notification when new information is available in [address] 9. Click Sign Up. You can now view your medical record. Additional Information  If you have questions, please contact the physician practice where you receive care. Remember, Housebites is NOT to be used for urgent needs. For medical emergencies, dial 911. For questions regarding your Housebites account call [phone]. If you have a clinical question, please call your doctor's office.

## 2017-08-31 NOTE — PROGRESS NOTES
Subjective:      Patient ID: Ade Fernandez. 52 y.o. male 1970    Chief Complaint   Patient presents with    Post-Op Check     PCNL 8-15-17       HPI    Past Medical History:   Diagnosis Date    Arthritis     Chondromatosis     lower spine tumor    Depression     GERD (gastroesophageal reflux disease)     Hypertension     Insomnia     Lower paraplegia (HCC)     ankles down    Meningitis spinal     at 8years old    PONV (postoperative nausea and vomiting)        Social History     Social History    Marital status:      Spouse name: N/A    Number of children: N/A    Years of education: N/A     Occupational History    Not on file.      Social History Main Topics    Smoking status: Former Smoker     Packs/day: 1.00     Quit date: 8/1/2013    Smokeless tobacco: Never Used      Comment: using vapor no nicotine    Alcohol use Yes      Comment: rarely    Drug use: No    Sexual activity: Not Currently     Other Topics Concern    Not on file     Social History Narrative       Family History   Problem Relation Age of Onset    Diabetes Mother     High Blood Pressure Mother     Kidney Disease Mother     Cancer Neg Hx     Stroke Neg Hx     Heart Disease Neg Hx        Past Surgical History:   Procedure Laterality Date    ABDOMEN SURGERY      BACK SURGERY      BLADDER SURGERY      sphincter augmentation    COLONOSCOPY      COLOSTOMY      DILATATION, ESOPHAGUS      ENDOSCOPY, COLON, DIAGNOSTIC      FRACTURE SURGERY      NEPHROLITHOTOMY Left 8/15/2017    LEFT PERCUTANEOUS NEPHROLITHOTRIPSY performed by Tom Burton MD at [address] [place] [address]  2002    skin flap bottom    SPINE SURGERY      Multiple    URETER SURGERY      replaced on left as kid    URETHRA SURGERY  02/17/2014    REVISION OF URETHRAL SPHINCTER       Allergies   Allergen Reactions    Asa Buff (Mag [Buffered Aspirin]      Tongue swelling    Prednisone      Insomnia, confusion, restless, Current Outpatient Prescriptions:     oxybutynin (DITROPAN) 5 MG tablet, Take 5 mg by mouth 3 times daily, Disp: , Rfl:     amLODIPine (NORVASC) 10 MG tablet, Take 1 tablet by mouth daily . hold if SBP less than 100 mm hg, Disp: 30 tablet, Rfl: 3    traMADol (ULTRAM) 50 MG tablet, Take by mouth every 6 hours as needed for Pain 1-2 tab, Disp: , Rfl:     oxyCODONE-acetaminophen (PERCOCET)  MG per tablet, Take 1 tablet by mouth every 8 hours as needed for Pain ., Disp: , Rfl:     gabapentin (NEURONTIN) 800 MG tablet, Take 800 mg by mouth 3 times daily, Disp: , Rfl:     amitriptyline (ELAVIL) 75 MG tablet, Take 75 mg by mouth nightly, Disp: , Rfl:     fentaNYL (DURAGESIC) 25 MCG/HR, Place 1 patch onto the skin every 72 hours . , Disp: , Rfl:     pantoprazole (PROTONIX) 40 MG tablet, TAKE 1 TABLET BY MOUTH TWICE DAILY, Disp: 180 tablet, Rfl: 3    metoprolol tartrate (LOPRESSOR) 50 MG tablet, Take 1 tablet by mouth 2 times daily . hold if SBP less than 100 mm hg or HR less than 60, Disp: 60 tablet, Rfl: 3    Review of Systems   Constitutional: Negative for chills and fever. HENT: Negative for congestion and dental problem. Eyes: Negative for redness and itching. Respiratory: Negative for choking and shortness of breath. Cardiovascular: Negative for chest pain and leg swelling. Gastrointestinal: Negative for constipation and diarrhea. Genitourinary: Negative for flank pain and hematuria. Skin: Negative for pallor and rash. Neurological: Negative for dizziness and headaches. Hematological: Does not bruise/bleed easily. Psychiatric/Behavioral: Negative for agitation and confusion. BP (!) 152/88  Wt 145 lb (65.8 kg)  BMI 20.81 kg/m2    Objective:   Physical Exam    Assessment:      No diagnosis found. Mr. Buck Sanchez presents today in {Blank single:27057::\"follow-up\",\"consultation\"} for No primary diagnosis found. .    ***       Plan:        ***

## 2017-09-08 ENCOUNTER — HOSPITAL ENCOUNTER (EMERGENCY)
Age: 47
Discharge: HOME | End: 2017-09-09
Payer: MEDICARE

## 2017-09-08 DIAGNOSIS — R10.31 ABDOMINAL PAIN, RIGHT LOWER QUADRANT: ICD-10-CM

## 2017-09-08 DIAGNOSIS — N30.00 ACUTE CYSTITIS WITHOUT HEMATURIA: Primary | ICD-10-CM

## 2017-09-08 LAB
ANISOCYTOSIS: ABNORMAL
BASOPHILS # BLD: 0.6 %
BASOPHILS ABSOLUTE: 0 THOU/MM3 (ref 0–0.1)
EOSINOPHIL # BLD: 1.9 %
EOSINOPHILS ABSOLUTE: 0.2 THOU/MM3 (ref 0–0.4)
HCT VFR BLD CALC: 34.9 % (ref 42–52)
HEMOGLOBIN: 11.7 GM/DL (ref 14–18)
HYPOCHROMIA: ABNORMAL
LYMPHOCYTES # BLD: 25.8 %
LYMPHOCYTES ABSOLUTE: 2.1 THOU/MM3 (ref 1–4.8)
MCH RBC QN AUTO: 26 PG (ref 27–31)
MCHC RBC AUTO-ENTMCNC: 33.4 GM/DL (ref 33–37)
MCV RBC AUTO: 77.7 FL (ref 80–94)
MICROCYTES: ABNORMAL
MONOCYTES # BLD: 11 %
MONOCYTES ABSOLUTE: 0.9 THOU/MM3 (ref 0.4–1.3)
NUCLEATED RED BLOOD CELLS: 0 /100 WBC
PDW BLD-RTO: 16.9 % (ref 11.5–14.5)
PLATELET # BLD: 347 THOU/MM3 (ref 130–400)
PMV BLD AUTO: 8.1 MCM (ref 7.4–10.4)
RBC # BLD: 4.5 MILL/MM3 (ref 4.7–6.1)
RBC # BLD: NORMAL 10*6/UL
SEG NEUTROPHILS: 60.7 %
SEGMENTED NEUTROPHILS ABSOLUTE COUNT: 5 THOU/MM3 (ref 1.8–7.7)
TROPONIN T: < 0.01 NG/ML
WBC # BLD: 8.2 THOU/MM3 (ref 4.8–10.8)

## 2017-09-08 PROCEDURE — 82248 BILIRUBIN DIRECT: CPT

## 2017-09-08 PROCEDURE — 83690 ASSAY OF LIPASE: CPT

## 2017-09-08 PROCEDURE — 84484 ASSAY OF TROPONIN QUANT: CPT

## 2017-09-08 PROCEDURE — 82150 ASSAY OF AMYLASE: CPT

## 2017-09-08 PROCEDURE — 87077 CULTURE AEROBIC IDENTIFY: CPT

## 2017-09-08 PROCEDURE — 36415 COLL VENOUS BLD VENIPUNCTURE: CPT

## 2017-09-08 PROCEDURE — 99284 EMERGENCY DEPT VISIT MOD MDM: CPT

## 2017-09-08 PROCEDURE — 81001 URINALYSIS AUTO W/SCOPE: CPT

## 2017-09-08 PROCEDURE — 87186 SC STD MICRODIL/AGAR DIL: CPT

## 2017-09-08 PROCEDURE — 85025 COMPLETE CBC W/AUTO DIFF WBC: CPT

## 2017-09-08 PROCEDURE — 87086 URINE CULTURE/COLONY COUNT: CPT

## 2017-09-08 PROCEDURE — 80053 COMPREHEN METABOLIC PANEL: CPT

## 2017-09-08 ASSESSMENT — PAIN DESCRIPTION - PAIN TYPE: TYPE: ACUTE PAIN

## 2017-09-08 ASSESSMENT — PAIN DESCRIPTION - LOCATION: LOCATION: ABDOMEN

## 2017-09-08 ASSESSMENT — PAIN DESCRIPTION - ORIENTATION: ORIENTATION: RIGHT;LOWER

## 2017-09-08 ASSESSMENT — PAIN SCALES - GENERAL: PAINLEVEL_OUTOF10: 8

## 2017-09-09 ENCOUNTER — APPOINTMENT (OUTPATIENT)
Dept: CT IMAGING | Age: 47
End: 2017-09-09
Payer: MEDICARE

## 2017-09-09 VITALS
HEIGHT: 66 IN | TEMPERATURE: 98.7 F | SYSTOLIC BLOOD PRESSURE: 170 MMHG | HEART RATE: 90 BPM | RESPIRATION RATE: 17 BRPM | BODY MASS INDEX: 23.3 KG/M2 | DIASTOLIC BLOOD PRESSURE: 90 MMHG | OXYGEN SATURATION: 99 % | WEIGHT: 145 LBS

## 2017-09-09 LAB
ALBUMIN SERPL-MCNC: 3.4 G/DL (ref 3.5–5.1)
ALP BLD-CCNC: 122 U/L (ref 38–126)
ALT SERPL-CCNC: 14 U/L (ref 11–66)
AMYLASE: 25 U/L (ref 20–104)
ANION GAP SERPL CALCULATED.3IONS-SCNC: 15 MEQ/L (ref 8–16)
AST SERPL-CCNC: 17 U/L (ref 5–40)
BACTERIA: ABNORMAL /HPF
BILIRUB SERPL-MCNC: < 0.2 MG/DL (ref 0.3–1.2)
BILIRUBIN DIRECT: < 0.2 MG/DL (ref 0–0.3)
BILIRUBIN URINE: NEGATIVE
BLOOD, URINE: ABNORMAL
BUN BLDV-MCNC: 10 MG/DL (ref 7–22)
CALCIUM SERPL-MCNC: 9.2 MG/DL (ref 8.5–10.5)
CASTS UA: ABNORMAL /LPF
CHARACTER, URINE: ABNORMAL
CHLORIDE BLD-SCNC: 99 MEQ/L (ref 98–111)
CO2: 25 MEQ/L (ref 23–33)
COLOR: YELLOW
CREAT SERPL-MCNC: 0.6 MG/DL (ref 0.4–1.2)
CRYSTALS, UA: ABNORMAL
EPITHELIAL CELLS, UA: ABNORMAL /HPF
GFR SERPL CREATININE-BSD FRML MDRD: > 90 ML/MIN/1.73M2
GLUCOSE BLD-MCNC: 98 MG/DL (ref 70–108)
GLUCOSE URINE: NEGATIVE MG/DL
KETONES, URINE: NEGATIVE
LACTIC ACID: 1.2 MMOL/L (ref 0.5–2.2)
LEUKOCYTE ESTERASE, URINE: ABNORMAL
LIPASE: 12.9 U/L (ref 5.6–51.3)
MISCELLANEOUS 2: ABNORMAL
NITRITE, URINE: NEGATIVE
OSMOLALITY CALCULATION: 276.6 MOSMOL/KG (ref 275–300)
PH UA: 6.5
POTASSIUM SERPL-SCNC: 3.8 MEQ/L (ref 3.5–5.2)
PROTEIN UA: ABNORMAL
RBC URINE: ABNORMAL /HPF
RENAL EPITHELIAL, UA: ABNORMAL
SODIUM BLD-SCNC: 139 MEQ/L (ref 135–145)
SPECIFIC GRAVITY, URINE: 1.01 (ref 1–1.03)
TOTAL PROTEIN: 7.3 G/DL (ref 6.1–8)
UROBILINOGEN, URINE: 1 EU/DL
WBC UA: > 100 /HPF
YEAST: ABNORMAL

## 2017-09-09 PROCEDURE — 2580000003 HC RX 258: Performed by: PHYSICIAN ASSISTANT

## 2017-09-09 PROCEDURE — 6360000002 HC RX W HCPCS: Performed by: PHYSICIAN ASSISTANT

## 2017-09-09 PROCEDURE — 96375 TX/PRO/DX INJ NEW DRUG ADDON: CPT

## 2017-09-09 PROCEDURE — 83605 ASSAY OF LACTIC ACID: CPT

## 2017-09-09 PROCEDURE — 36415 COLL VENOUS BLD VENIPUNCTURE: CPT

## 2017-09-09 PROCEDURE — 96365 THER/PROPH/DIAG IV INF INIT: CPT

## 2017-09-09 PROCEDURE — 74176 CT ABD & PELVIS W/O CONTRAST: CPT

## 2017-09-09 PROCEDURE — 87040 BLOOD CULTURE FOR BACTERIA: CPT

## 2017-09-09 RX ORDER — HYDROCODONE BITARTRATE AND ACETAMINOPHEN 5; 325 MG/1; MG/1
1 TABLET ORAL EVERY 8 HOURS PRN
Qty: 10 TABLET | Refills: 0 | Status: ON HOLD | OUTPATIENT
Start: 2017-09-09 | End: 2017-09-25 | Stop reason: ALTCHOICE

## 2017-09-09 RX ORDER — SULFAMETHOXAZOLE AND TRIMETHOPRIM 800; 160 MG/1; MG/1
1 TABLET ORAL 2 TIMES DAILY
Qty: 20 TABLET | Refills: 0 | Status: SHIPPED | OUTPATIENT
Start: 2017-09-09 | End: 2017-09-19

## 2017-09-09 RX ORDER — SODIUM CHLORIDE 9 MG/ML
INJECTION, SOLUTION INTRAVENOUS CONTINUOUS
Status: DISCONTINUED | OUTPATIENT
Start: 2017-09-09 | End: 2017-09-09 | Stop reason: HOSPADM

## 2017-09-09 RX ADMIN — CEFTRIAXONE 1 G: 1 INJECTION, POWDER, FOR SOLUTION INTRAMUSCULAR; INTRAVENOUS at 02:05

## 2017-09-09 RX ADMIN — SODIUM CHLORIDE: 9 INJECTION, SOLUTION INTRAVENOUS at 01:55

## 2017-09-09 RX ADMIN — HYDROMORPHONE HYDROCHLORIDE 0.5 MG: 1 INJECTION, SOLUTION INTRAMUSCULAR; INTRAVENOUS; SUBCUTANEOUS at 02:05

## 2017-09-09 ASSESSMENT — ENCOUNTER SYMPTOMS
DIARRHEA: 0
EYE REDNESS: 0
SHORTNESS OF BREATH: 0
RHINORRHEA: 0
EYE DISCHARGE: 0
BACK PAIN: 0
SORE THROAT: 0
VOMITING: 0
COUGH: 0
ABDOMINAL PAIN: 1
WHEEZING: 0
NAUSEA: 0

## 2017-09-11 ENCOUNTER — CARE COORDINATION (OUTPATIENT)
Dept: FAMILY MEDICINE CLINIC | Age: 47
End: 2017-09-11

## 2017-09-11 LAB
ORGANISM: ABNORMAL
URINE CULTURE REFLEX: ABNORMAL

## 2017-09-14 LAB
BLOOD CULTURE, ROUTINE: NORMAL
BLOOD CULTURE, ROUTINE: NORMAL

## 2017-09-19 ENCOUNTER — CARE COORDINATION (OUTPATIENT)
Dept: CARE COORDINATION | Age: 47
End: 2017-09-19

## 2017-09-24 ENCOUNTER — HOSPITAL ENCOUNTER (INPATIENT)
Age: 47
LOS: 3 days | Discharge: HOME | DRG: 373 | End: 2017-09-28
Attending: FAMILY MEDICINE | Admitting: UROLOGY
Payer: MEDICARE

## 2017-09-24 ENCOUNTER — APPOINTMENT (OUTPATIENT)
Dept: CT IMAGING | Age: 47
DRG: 373 | End: 2017-09-24
Payer: MEDICARE

## 2017-09-24 DIAGNOSIS — R31.9 URINARY TRACT INFECTION WITH HEMATURIA, SITE UNSPECIFIED: ICD-10-CM

## 2017-09-24 DIAGNOSIS — R10.31 RIGHT LOWER QUADRANT ABDOMINAL PAIN: Primary | ICD-10-CM

## 2017-09-24 DIAGNOSIS — N39.0 URINARY TRACT INFECTION WITH HEMATURIA, SITE UNSPECIFIED: ICD-10-CM

## 2017-09-24 LAB
ALBUMIN SERPL-MCNC: 3.3 G/DL (ref 3.5–5.1)
ALP BLD-CCNC: 116 U/L (ref 38–126)
ALT SERPL-CCNC: 15 U/L (ref 11–66)
AMYLASE: 15 U/L (ref 20–104)
ANION GAP SERPL CALCULATED.3IONS-SCNC: 15 MEQ/L (ref 8–16)
ANISOCYTOSIS: ABNORMAL
AST SERPL-CCNC: 17 U/L (ref 5–40)
BACTERIA: ABNORMAL /HPF
BASOPHILS # BLD: 0.1 %
BASOPHILS ABSOLUTE: 0 THOU/MM3 (ref 0–0.1)
BILIRUB SERPL-MCNC: 0.4 MG/DL (ref 0.3–1.2)
BILIRUBIN DIRECT: < 0.2 MG/DL (ref 0–0.3)
BILIRUBIN URINE: NEGATIVE
BLOOD, URINE: ABNORMAL
BUN BLDV-MCNC: 6 MG/DL (ref 7–22)
CALCIUM SERPL-MCNC: 8.8 MG/DL (ref 8.5–10.5)
CASTS 2: ABNORMAL /LPF
CASTS UA: ABNORMAL /LPF
CHARACTER, URINE: ABNORMAL
CHLORIDE BLD-SCNC: 95 MEQ/L (ref 98–111)
CO2: 24 MEQ/L (ref 23–33)
COLOR: YELLOW
CREAT SERPL-MCNC: 0.7 MG/DL (ref 0.4–1.2)
CRYSTALS, UA: ABNORMAL
EOSINOPHIL # BLD: 0.6 %
EOSINOPHILS ABSOLUTE: 0.1 THOU/MM3 (ref 0–0.4)
EPITHELIAL CELLS, UA: ABNORMAL /HPF
GFR SERPL CREATININE-BSD FRML MDRD: > 90 ML/MIN/1.73M2
GLUCOSE BLD-MCNC: 133 MG/DL (ref 70–108)
GLUCOSE URINE: NEGATIVE MG/DL
HCT VFR BLD CALC: 35.2 % (ref 42–52)
HEMOGLOBIN: 11.4 GM/DL (ref 14–18)
HYPOCHROMIA: ABNORMAL
KETONES, URINE: NEGATIVE
LEUKOCYTE ESTERASE, URINE: ABNORMAL
LIPASE: 11 U/L (ref 5.6–51.3)
LYMPHOCYTES # BLD: 11.3 %
LYMPHOCYTES ABSOLUTE: 1 THOU/MM3 (ref 1–4.8)
MCH RBC QN AUTO: 25 PG (ref 27–31)
MCHC RBC AUTO-ENTMCNC: 32.5 GM/DL (ref 33–37)
MCV RBC AUTO: 76.9 FL (ref 80–94)
MICROCYTES: ABNORMAL
MISCELLANEOUS 2: ABNORMAL
MONOCYTES # BLD: 8.5 %
MONOCYTES ABSOLUTE: 0.8 THOU/MM3 (ref 0.4–1.3)
NITRITE, URINE: NEGATIVE
NUCLEATED RED BLOOD CELLS: 0 /100 WBC
OSMOLALITY CALCULATION: 267.8 MOSMOL/KG (ref 275–300)
PDW BLD-RTO: 17.3 % (ref 11.5–14.5)
PH UA: 7
PLATELET # BLD: 424 THOU/MM3 (ref 130–400)
PMV BLD AUTO: 7.6 MCM (ref 7.4–10.4)
POTASSIUM SERPL-SCNC: 3.6 MEQ/L (ref 3.5–5.2)
PRO-BNP: 108.7 PG/ML (ref 0–450)
PROTEIN UA: NEGATIVE
RBC # BLD: 4.58 MILL/MM3 (ref 4.7–6.1)
RBC # BLD: ABNORMAL 10*6/UL
RBC URINE: ABNORMAL /HPF
RENAL EPITHELIAL, UA: ABNORMAL
SEG NEUTROPHILS: 79.5 %
SEGMENTED NEUTROPHILS ABSOLUTE COUNT: 7.2 THOU/MM3 (ref 1.8–7.7)
SODIUM BLD-SCNC: 134 MEQ/L (ref 135–145)
SPECIFIC GRAVITY, URINE: 1.01 (ref 1–1.03)
TOTAL PROTEIN: 7.3 G/DL (ref 6.1–8)
TROPONIN T: < 0.01 NG/ML
UROBILINOGEN, URINE: 1 EU/DL
WBC # BLD: 9 THOU/MM3 (ref 4.8–10.8)
WBC UA: > 200 /HPF
YEAST: ABNORMAL

## 2017-09-24 PROCEDURE — 96375 TX/PRO/DX INJ NEW DRUG ADDON: CPT

## 2017-09-24 PROCEDURE — 81001 URINALYSIS AUTO W/SCOPE: CPT

## 2017-09-24 PROCEDURE — 87086 URINE CULTURE/COLONY COUNT: CPT

## 2017-09-24 PROCEDURE — 82248 BILIRUBIN DIRECT: CPT

## 2017-09-24 PROCEDURE — 80053 COMPREHEN METABOLIC PANEL: CPT

## 2017-09-24 PROCEDURE — 83880 ASSAY OF NATRIURETIC PEPTIDE: CPT

## 2017-09-24 PROCEDURE — 85025 COMPLETE CBC W/AUTO DIFF WBC: CPT

## 2017-09-24 PROCEDURE — 36415 COLL VENOUS BLD VENIPUNCTURE: CPT

## 2017-09-24 PROCEDURE — 99285 EMERGENCY DEPT VISIT HI MDM: CPT

## 2017-09-24 PROCEDURE — 6360000004 HC RX CONTRAST MEDICATION: Performed by: FAMILY MEDICINE

## 2017-09-24 PROCEDURE — 6360000002 HC RX W HCPCS: Performed by: FAMILY MEDICINE

## 2017-09-24 PROCEDURE — 81003 URINALYSIS AUTO W/O SCOPE: CPT

## 2017-09-24 PROCEDURE — 93005 ELECTROCARDIOGRAM TRACING: CPT | Performed by: NURSE PRACTITIONER

## 2017-09-24 PROCEDURE — 83690 ASSAY OF LIPASE: CPT

## 2017-09-24 PROCEDURE — 74177 CT ABD & PELVIS W/CONTRAST: CPT

## 2017-09-24 PROCEDURE — 84484 ASSAY OF TROPONIN QUANT: CPT

## 2017-09-24 PROCEDURE — 82150 ASSAY OF AMYLASE: CPT

## 2017-09-24 PROCEDURE — 96374 THER/PROPH/DIAG INJ IV PUSH: CPT

## 2017-09-24 RX ORDER — ONDANSETRON 2 MG/ML
4 INJECTION INTRAMUSCULAR; INTRAVENOUS ONCE
Status: COMPLETED | OUTPATIENT
Start: 2017-09-24 | End: 2017-09-24

## 2017-09-24 RX ORDER — MORPHINE SULFATE 4 MG/ML
4 INJECTION, SOLUTION INTRAMUSCULAR; INTRAVENOUS ONCE
Status: COMPLETED | OUTPATIENT
Start: 2017-09-24 | End: 2017-09-24

## 2017-09-24 RX ORDER — ONDANSETRON 4 MG/1
4 TABLET, ORALLY DISINTEGRATING ORAL ONCE
Status: COMPLETED | OUTPATIENT
Start: 2017-09-24 | End: 2017-09-24

## 2017-09-24 RX ORDER — FENTANYL CITRATE 50 UG/ML
50 INJECTION, SOLUTION INTRAMUSCULAR; INTRAVENOUS ONCE
Status: COMPLETED | OUTPATIENT
Start: 2017-09-24 | End: 2017-09-24

## 2017-09-24 RX ADMIN — ONDANSETRON 4 MG: 4 TABLET, ORALLY DISINTEGRATING ORAL at 21:51

## 2017-09-24 RX ADMIN — IOPAMIDOL 80 ML: 755 INJECTION, SOLUTION INTRAVENOUS at 21:18

## 2017-09-24 RX ADMIN — ONDANSETRON 4 MG: 2 INJECTION INTRAMUSCULAR; INTRAVENOUS at 20:27

## 2017-09-24 RX ADMIN — FENTANYL CITRATE 50 MCG: 50 INJECTION INTRAMUSCULAR; INTRAVENOUS at 20:28

## 2017-09-24 RX ADMIN — MORPHINE SULFATE 4 MG: 4 INJECTION, SOLUTION INTRAMUSCULAR; INTRAVENOUS at 21:51

## 2017-09-24 ASSESSMENT — PAIN DESCRIPTION - ORIENTATION
ORIENTATION: RIGHT;MID;LOWER

## 2017-09-24 ASSESSMENT — ENCOUNTER SYMPTOMS
COUGH: 0
RHINORRHEA: 0
EYE REDNESS: 0
WHEEZING: 0
VOMITING: 0
SHORTNESS OF BREATH: 0
SORE THROAT: 0
BACK PAIN: 1
DIARRHEA: 0
EYE DISCHARGE: 0
NAUSEA: 0
ABDOMINAL PAIN: 1

## 2017-09-24 ASSESSMENT — PAIN DESCRIPTION - DESCRIPTORS
DESCRIPTORS: STABBING
DESCRIPTORS: SQUEEZING
DESCRIPTORS: SQUEEZING
DESCRIPTORS: SHOOTING

## 2017-09-24 ASSESSMENT — PAIN SCALES - GENERAL
PAINLEVEL_OUTOF10: 10
PAINLEVEL_OUTOF10: 7
PAINLEVEL_OUTOF10: 10

## 2017-09-24 ASSESSMENT — PAIN DESCRIPTION - LOCATION
LOCATION: ABDOMEN

## 2017-09-24 ASSESSMENT — PAIN DESCRIPTION - PAIN TYPE
TYPE: ACUTE PAIN

## 2017-09-24 NOTE — IP AVS SNAPSHOT
After Visit Summary  (Discharge Instructions)    Medication List for Home    Based on the information you provided to us as well as any changes during this visit, the following is your updated medication list.  Compare this with your prescription bottles at home. If you have any questions or concerns, contact your primary care physician's office. Daily Medication List (This medication list can be shared with any healthcare provider who is helping you manage your medications)      There are NEW medications for you. START taking them after you leave the hospital        Last Dose    Next Dose Due AM NOON PM NIGHT    amoxicillin-clavulanate 875-125 MG per tablet   Commonly known as:  AUGMENTIN   Take 1 tablet by mouth 2 times daily for 10 days                                              These are medications you told us you were taking at home, CONTINUE taking them after you leave the hospital        Last Dose    Next Dose Due AM NOON PM NIGHT    amitriptyline 75 MG tablet   Commonly known as:  ELAVIL   Take 75 mg by mouth nightly                75 mg on 9/26/2017  8:33 PM                               amLODIPine 10 MG tablet   Commonly known as:  NORVASC   Take 1 tablet by mouth daily . hold if SBP less than 100 mm hg                10 mg on 9/27/2017  9:28 AM                               fentaNYL 25 MCG/HR   Commonly known as:  [address] 1 patch onto the skin every 72 hours . 1 patch on 9/27/2017  9:28 AM     9/30/17                         gabapentin 800 MG tablet   Commonly known as:  NEURONTIN   Take 800 mg by mouth 3 times daily                                            metoprolol tartrate 50 MG tablet   Commonly known as:  LOPRESSOR   Take 1 tablet by mouth 2 times daily . hold if SBP less than 100 mm hg or HR less than 60                50 mg on 9/27/2017  9:19 PM                               oxybutynin 5 MG tablet   Commonly known as:  JJIYUZBP Take 5 mg by mouth 3 times daily                5 mg on 9/28/2017  8:08 AM                               oxyCODONE-acetaminophen  MG per tablet   Commonly known as:  PERCOCET   Take 1 tablet by mouth every 8 hours as needed for Pain .                                         pantoprazole 40 MG tablet   Commonly known as:  PROTONIX   TAKE 1 TABLET BY MOUTH TWICE DAILY                40 mg on 9/28/2017  8:08 AM                            traMADol 50 MG tablet   Commonly known as:  ULTRAM   Take by mouth every 6 hours as needed for Pain 1-2 tab                50 mg on 9/25/2017  9:21 AM                                 Where to Get Your Medications      These medications were sent to [facility] [place] [address] [address] [facility]  [address], [address] [facility]     Phone:  [phone]     amoxicillin-clavulanate 875-125 MG per tablet               Allergies as of 9/28/2017        Reactions    Asa Buff (Mag [Buffered Aspirin]     Tongue swelling    Prednisone     Insomnia, confusion, restless,      Immunizations as of 9/28/2017     Name Date Dose VIS Date Route    Influenza Virus Vaccine 12/1/2015 0.5 mL 8/7/2015 Intramuscular    Influenza Virus Vaccine 10/11/2013 0.5 mL 7/2/2012 Intramuscular    Influenza Virus Vaccine 12/4/2012 -- -- --    Comment: RAYS    Influenza Whole 9/26/2011 -- -- --    Influenza, High Dose 10/1/2009 -- -- --    External: Patient reported    Influenza, Quadv, 3 Years and older, IM 11/1/2016 0.5 mL 8/7/2015 Intramuscular      Last Vitals          Most Recent Value    Temp  98.1 °F (36.7 °C)    Pulse  66    Resp  20    BP  (!)  104/51         After Visit Summary    This summary was created for you. Thank you for entrusting your care to us.   The following information includes details about your hospital/visit stay along with steps you should take to help with your recovery once you leave the hospital.  In this packet, you will find information about the topics listed below:    · Instructions about your medications including a list of your home medications  · A summary of your hospital visit  · Follow-up appointments once you have left the hospital  · Your care plan at home      You may receive a survey regarding the care you received during your stay. Your input is valuable to us. We encourage you to complete and return your survey in the envelope provided. We hope you will choose us in the future for your healthcare needs. Patient Information     Patient Name SIRENA Lagos. 1970      Care Provided at:     Name Address Phone       [address] [phone]            Your Visit    Here you will find information about your visit, including the reason for your visit. Please take this sheet with you when you visit your doctor or other health care provider in the future. It will help determine the best possible medical care for you at that time. If you have any questions once you leave the hospital, please call the department phone number listed below. Why you were here     Your primary diagnosis was:  Not on File    Your diagnoses also included:  Pelvic Abscess In Male (Hcc)      Visit Information     Date & Time Department Dept. Phone    2017 [id number] [facility] [phone]       Follow-up Appointments    Below is a list of your follow-up and future appointments. This may not be a complete list as you may have made appointments directly with providers that we are not aware of or your providers may have made some for you. Please call your providers to confirm appointments. It is important to keep your appointments. Please bring your current insurance card, photo ID, co-pay, and all medication bottles to your appointment. If self-pay, payment is expected at the time of service. 8. Enter your e-mail address. You will receive e-mail notification when new information is available in [address] 9. Click Sign Up. You can now view your medical record. Additional Information  If you have questions, please contact the physician practice where you receive care. Remember, MyChart is NOT to be used for urgent needs. For medical emergencies, dial 911. For questions regarding your MyChart account call [phone]. If you have a clinical question, please call your doctor's office. View your information online  ? Review your current list of  medications, immunization, and allergies. ? Review your future test results online . ? Review your discharge instructions provided by your caregivers at discharge    Certain functionality such as prescription refills, scheduling appointments or sending messages to your provider are not activated if your provider does not use MyStarAutograph in his/her office    For questions regarding your MyChart account call [phone]. If you have a clinical question, please call your doctor's office. The information on all pages of the After Visit Summary has been reviewed with me, the patient and/or responsible adult, by my health care provider(s). I had the opportunity to ask questions regarding this information. I understand I should dispose of my armband safely at home to protect my health information. A complete copy of the After Visit Summary has been given to me, the patient and/or responsible adult.            Patient Signature/Responsible Adult:____________________    Clinician Signature:_____________________    Date:_____________________    Time:_____________________

## 2017-09-25 ENCOUNTER — APPOINTMENT (OUTPATIENT)
Dept: MRI IMAGING | Age: 47
DRG: 373 | End: 2017-09-25
Payer: MEDICARE

## 2017-09-25 LAB
EKG ATRIAL RATE: 87 BPM
EKG P AXIS: 31 DEGREES
EKG P-R INTERVAL: 134 MS
EKG Q-T INTERVAL: 346 MS
EKG QRS DURATION: 100 MS
EKG QTC CALCULATION (BAZETT): 416 MS
EKG R AXIS: 28 DEGREES
EKG T AXIS: -11 DEGREES
EKG VENTRICULAR RATE: 87 BPM

## 2017-09-25 PROCEDURE — 2580000003 HC RX 258: Performed by: FAMILY MEDICINE

## 2017-09-25 PROCEDURE — 51798 US URINE CAPACITY MEASURE: CPT

## 2017-09-25 PROCEDURE — 6360000004 HC RX CONTRAST MEDICATION: Performed by: NURSE PRACTITIONER

## 2017-09-25 PROCEDURE — 72197 MRI PELVIS W/O & W/DYE: CPT

## 2017-09-25 PROCEDURE — 6360000002 HC RX W HCPCS: Performed by: FAMILY MEDICINE

## 2017-09-25 PROCEDURE — 6360000002 HC RX W HCPCS: Performed by: NURSE PRACTITIONER

## 2017-09-25 PROCEDURE — 99223 1ST HOSP IP/OBS HIGH 75: CPT | Performed by: NURSE PRACTITIONER

## 2017-09-25 PROCEDURE — 2580000003 HC RX 258: Performed by: NURSE PRACTITIONER

## 2017-09-25 PROCEDURE — A9579 GAD-BASE MR CONTRAST NOS,1ML: HCPCS | Performed by: NURSE PRACTITIONER

## 2017-09-25 PROCEDURE — 6370000000 HC RX 637 (ALT 250 FOR IP): Performed by: NURSE PRACTITIONER

## 2017-09-25 PROCEDURE — 1200000000 HC SEMI PRIVATE

## 2017-09-25 PROCEDURE — 6360000002 HC RX W HCPCS: Performed by: UROLOGY

## 2017-09-25 PROCEDURE — 96376 TX/PRO/DX INJ SAME DRUG ADON: CPT

## 2017-09-25 RX ORDER — AMLODIPINE BESYLATE 10 MG/1
10 TABLET ORAL DAILY
Status: DISCONTINUED | OUTPATIENT
Start: 2017-09-25 | End: 2017-09-28 | Stop reason: HOSPADM

## 2017-09-25 RX ORDER — TRAMADOL HYDROCHLORIDE 50 MG/1
50 TABLET ORAL EVERY 6 HOURS PRN
Status: DISCONTINUED | OUTPATIENT
Start: 2017-09-25 | End: 2017-09-28 | Stop reason: HOSPADM

## 2017-09-25 RX ORDER — METOPROLOL TARTRATE 50 MG/1
50 TABLET, FILM COATED ORAL 2 TIMES DAILY
Status: DISCONTINUED | OUTPATIENT
Start: 2017-09-25 | End: 2017-09-28 | Stop reason: HOSPADM

## 2017-09-25 RX ORDER — PANTOPRAZOLE SODIUM 40 MG/1
40 TABLET, DELAYED RELEASE ORAL 2 TIMES DAILY
Status: DISCONTINUED | OUTPATIENT
Start: 2017-09-25 | End: 2017-09-28 | Stop reason: HOSPADM

## 2017-09-25 RX ORDER — OXYBUTYNIN CHLORIDE 5 MG/1
5 TABLET ORAL 3 TIMES DAILY
Status: DISCONTINUED | OUTPATIENT
Start: 2017-09-25 | End: 2017-09-28 | Stop reason: HOSPADM

## 2017-09-25 RX ORDER — ACETAMINOPHEN 325 MG/1
650 TABLET ORAL EVERY 4 HOURS PRN
Status: DISCONTINUED | OUTPATIENT
Start: 2017-09-25 | End: 2017-09-28 | Stop reason: HOSPADM

## 2017-09-25 RX ORDER — FENTANYL 25 UG/H
1 PATCH TRANSDERMAL
Status: DISCONTINUED | OUTPATIENT
Start: 2017-09-27 | End: 2017-09-28 | Stop reason: HOSPADM

## 2017-09-25 RX ORDER — SODIUM CHLORIDE 9 MG/ML
INJECTION, SOLUTION INTRAVENOUS CONTINUOUS
Status: DISCONTINUED | OUTPATIENT
Start: 2017-09-25 | End: 2017-09-28 | Stop reason: HOSPADM

## 2017-09-25 RX ORDER — MORPHINE SULFATE 10 MG/ML
6 INJECTION, SOLUTION INTRAMUSCULAR; INTRAVENOUS ONCE
Status: COMPLETED | OUTPATIENT
Start: 2017-09-25 | End: 2017-09-25

## 2017-09-25 RX ORDER — MORPHINE SULFATE 2 MG/ML
4 INJECTION, SOLUTION INTRAMUSCULAR; INTRAVENOUS
Status: DISCONTINUED | OUTPATIENT
Start: 2017-09-25 | End: 2017-09-28 | Stop reason: HOSPADM

## 2017-09-25 RX ORDER — AMITRIPTYLINE HYDROCHLORIDE 75 MG/1
75 TABLET, FILM COATED ORAL NIGHTLY
Status: DISCONTINUED | OUTPATIENT
Start: 2017-09-25 | End: 2017-09-28 | Stop reason: HOSPADM

## 2017-09-25 RX ORDER — GABAPENTIN 400 MG/1
800 CAPSULE ORAL 3 TIMES DAILY
Status: DISCONTINUED | OUTPATIENT
Start: 2017-09-25 | End: 2017-09-28 | Stop reason: HOSPADM

## 2017-09-25 RX ORDER — FENTANYL 25 UG/H
1 PATCH TRANSDERMAL
Status: DISCONTINUED | OUTPATIENT
Start: 2017-09-25 | End: 2017-09-25

## 2017-09-25 RX ADMIN — GABAPENTIN 800 MG: 400 CAPSULE ORAL at 09:02

## 2017-09-25 RX ADMIN — GABAPENTIN 800 MG: 400 CAPSULE ORAL at 15:06

## 2017-09-25 RX ADMIN — SODIUM CHLORIDE: 9 INJECTION, SOLUTION INTRAVENOUS at 04:30

## 2017-09-25 RX ADMIN — SODIUM CHLORIDE: 9 INJECTION, SOLUTION INTRAVENOUS at 14:19

## 2017-09-25 RX ADMIN — GADOTERIDOL 15 ML: 279.3 INJECTION, SOLUTION INTRAVENOUS at 17:24

## 2017-09-25 RX ADMIN — MORPHINE SULFATE 4 MG: 2 INJECTION, SOLUTION INTRAMUSCULAR; INTRAVENOUS at 20:51

## 2017-09-25 RX ADMIN — MORPHINE SULFATE 4 MG: 2 INJECTION, SOLUTION INTRAMUSCULAR; INTRAVENOUS at 14:12

## 2017-09-25 RX ADMIN — MORPHINE SULFATE 4 MG: 2 INJECTION, SOLUTION INTRAMUSCULAR; INTRAVENOUS at 07:32

## 2017-09-25 RX ADMIN — CEFTRIAXONE SODIUM 1 G: 1 INJECTION, POWDER, FOR SOLUTION INTRAMUSCULAR; INTRAVENOUS at 01:22

## 2017-09-25 RX ADMIN — PANTOPRAZOLE SODIUM 40 MG: 40 TABLET, DELAYED RELEASE ORAL at 09:02

## 2017-09-25 RX ADMIN — METOPROLOL TARTRATE 50 MG: 50 TABLET, FILM COATED ORAL at 09:02

## 2017-09-25 RX ADMIN — OXYBUTYNIN CHLORIDE 5 MG: 5 TABLET ORAL at 10:46

## 2017-09-25 RX ADMIN — PANTOPRAZOLE SODIUM 40 MG: 40 TABLET, DELAYED RELEASE ORAL at 21:05

## 2017-09-25 RX ADMIN — METOPROLOL TARTRATE 50 MG: 50 TABLET, FILM COATED ORAL at 21:05

## 2017-09-25 RX ADMIN — TRAMADOL HYDROCHLORIDE 50 MG: 50 TABLET, FILM COATED ORAL at 09:21

## 2017-09-25 RX ADMIN — MORPHINE SULFATE 4 MG: 2 INJECTION, SOLUTION INTRAMUSCULAR; INTRAVENOUS at 17:50

## 2017-09-25 RX ADMIN — GABAPENTIN 800 MG: 400 CAPSULE ORAL at 21:05

## 2017-09-25 RX ADMIN — OXYBUTYNIN CHLORIDE 5 MG: 5 TABLET ORAL at 21:05

## 2017-09-25 RX ADMIN — ENOXAPARIN SODIUM 40 MG: 40 INJECTION SUBCUTANEOUS at 09:22

## 2017-09-25 RX ADMIN — MORPHINE SULFATE 6 MG: 10 INJECTION, SOLUTION INTRAMUSCULAR; INTRAVENOUS at 00:51

## 2017-09-25 RX ADMIN — AMLODIPINE BESYLATE 10 MG: 10 TABLET ORAL at 09:01

## 2017-09-25 RX ADMIN — OXYBUTYNIN CHLORIDE 5 MG: 5 TABLET ORAL at 15:06

## 2017-09-25 RX ADMIN — MORPHINE SULFATE 4 MG: 2 INJECTION, SOLUTION INTRAMUSCULAR; INTRAVENOUS at 10:58

## 2017-09-25 RX ADMIN — MORPHINE SULFATE 4 MG: 2 INJECTION, SOLUTION INTRAMUSCULAR; INTRAVENOUS at 04:26

## 2017-09-25 ASSESSMENT — PAIN DESCRIPTION - DIRECTION
RADIATING_TOWARDS: BACK

## 2017-09-25 ASSESSMENT — PAIN SCALES - GENERAL
PAINLEVEL_OUTOF10: 7
PAINLEVEL_OUTOF10: 10
PAINLEVEL_OUTOF10: 10
PAINLEVEL_OUTOF10: 8
PAINLEVEL_OUTOF10: 8
PAINLEVEL_OUTOF10: 9
PAINLEVEL_OUTOF10: 8
PAINLEVEL_OUTOF10: 9
PAINLEVEL_OUTOF10: 8
PAINLEVEL_OUTOF10: 10
PAINLEVEL_OUTOF10: 8
PAINLEVEL_OUTOF10: 7
PAINLEVEL_OUTOF10: 10
PAINLEVEL_OUTOF10: 10
PAINLEVEL_OUTOF10: 8
PAINLEVEL_OUTOF10: 10
PAINLEVEL_OUTOF10: 8
PAINLEVEL_OUTOF10: 9
PAINLEVEL_OUTOF10: 8
PAINLEVEL_OUTOF10: 9

## 2017-09-25 ASSESSMENT — PAIN DESCRIPTION - PAIN TYPE
TYPE: ACUTE PAIN

## 2017-09-25 ASSESSMENT — PAIN DESCRIPTION - FREQUENCY
FREQUENCY: CONTINUOUS
FREQUENCY: CONTINUOUS

## 2017-09-25 ASSESSMENT — PAIN DESCRIPTION - LOCATION
LOCATION: ABDOMEN

## 2017-09-25 ASSESSMENT — PAIN DESCRIPTION - PROGRESSION

## 2017-09-25 ASSESSMENT — PAIN DESCRIPTION - ORIENTATION
ORIENTATION: MID;LOWER;RIGHT
ORIENTATION: RIGHT;MID;LOWER
ORIENTATION: LOWER
ORIENTATION: RIGHT;LOWER
ORIENTATION: LOWER
ORIENTATION: RIGHT;LOWER
ORIENTATION: LOWER
ORIENTATION: MID;LOWER;RIGHT
ORIENTATION: RIGHT;LOWER
ORIENTATION: LOWER
ORIENTATION: RIGHT;LOWER
ORIENTATION: RIGHT;MID;LOWER
ORIENTATION: MID;LOWER;RIGHT

## 2017-09-25 ASSESSMENT — PAIN DESCRIPTION - DESCRIPTORS
DESCRIPTORS: SQUEEZING
DESCRIPTORS: ACHING
DESCRIPTORS: TIGHTNESS;SHOOTING
DESCRIPTORS: ACHING
DESCRIPTORS: SHARP;ACHING;SORE

## 2017-09-25 ASSESSMENT — PAIN DESCRIPTION - ONSET: ONSET: ON-GOING

## 2017-09-25 NOTE — ED NOTES
In to reassess pt and help pt with the urinal. Pt c/o pain rating it as a 10/10 in his right lower abdomen. Call light in reach. Respirations unlabored. Will continue to monitor.       Aguilar Barrios RN  09/24/17 6633

## 2017-09-25 NOTE — ED NOTES
In to reassess pt. Pt c/o abdominal pain, rating it as a 7. Pt resting on cot in semi fowlers, respirations easy and unlabored. Updated pt on plan of care. Will continue to monitor.       Aguilar Barrios RN  09/25/17 0005

## 2017-09-25 NOTE — ED NOTES
Called Floor. Transporting pt to [address] On cart.      [address], [place]  01/69/99 6501

## 2017-09-25 NOTE — ED PROVIDER NOTES
ideas. The patient is not nervous/anxious. PAST MEDICAL HISTORY    has a past medical history of Arthritis; Chondromatosis; Depression; GERD (gastroesophageal reflux disease); Hypertension; Insomnia; Lower paraplegia ([address] Meningitis spinal; and PONV (postoperative nausea and vomiting). SURGICAL HISTORY      has a past surgical history that includes Spine surgery; other surgical history (); Ureter surgery; Bladder surgery; Urethra surgery (2014); colostomy; Abdomen surgery; back surgery; Colonoscopy; Endoscopy, colon, diagnostic; fracture surgery; Dilatation, esophagus; and NEPHROLITHOTOMY (Left, 8/15/2017). CURRENT MEDICATIONS       Previous Medications    AMITRIPTYLINE (ELAVIL) 75 MG TABLET    Take 75 mg by mouth nightly    AMLODIPINE (NORVASC) 10 MG TABLET    Take 1 tablet by mouth daily . hold if SBP less than 100 mm hg    FENTANYL (DURAGESIC) 25 MCG/HR    Place 1 patch onto the skin every 72 hours . GABAPENTIN (NEURONTIN) 800 MG TABLET    Take 800 mg by mouth 3 times daily    HYDROCODONE-ACETAMINOPHEN (NORCO) 5-325 MG PER TABLET    Take 1 tablet by mouth every 8 hours as needed for Pain . METOPROLOL TARTRATE (LOPRESSOR) 50 MG TABLET    Take 1 tablet by mouth 2 times daily . hold if SBP less than 100 mm hg or HR less than 60    OXYBUTYNIN (DITROPAN) 5 MG TABLET    Take 5 mg by mouth 3 times daily    OXYCODONE-ACETAMINOPHEN (PERCOCET)  MG PER TABLET    Take 1 tablet by mouth every 8 hours as needed for Pain . PANTOPRAZOLE (PROTONIX) 40 MG TABLET    TAKE 1 TABLET BY MOUTH TWICE DAILY    TRAMADOL (ULTRAM) 50 MG TABLET    Take by mouth every 6 hours as needed for Pain 1-2 tab       ALLERGIES     is allergic to asa buff (mag [buffered aspirin] and prednisone. FAMILY HISTORY     indicated that his mother is alive. He indicated that his father is .  He indicated that the status of his neg hx is unknown.  family history includes Diabetes in his mother; High Blood Pressure in his mother; Kidney Disease in his mother. There is no history of Cancer, Stroke, or Heart Disease. SOCIAL HISTORY      reports that he quit smoking about 4 years ago. He smoked 1.00 pack per day. He has never used smokeless tobacco. He reports that he drinks alcohol. He reports that he does not use illicit drugs. PHYSICAL EXAM     INITIAL VITALS:  weight is 146 lb (66.2 kg). His oral temperature is 98.7 °F (37.1 °C). His blood pressure is 142/74 (abnormal) and his pulse is 87. His respiration is 17 and oxygen saturation is 100%. Physical Exam   Constitutional: He is oriented to person, place, and time. He appears well-developed and well-nourished. HENT:   Head: Normocephalic and atraumatic. Right Ear: External ear normal.   Left Ear: External ear normal.   Eyes: Conjunctivae are normal. Right eye exhibits no discharge. Left eye exhibits no discharge. No scleral icterus. Neck: Normal range of motion. Neck supple. No JVD present. Cardiovascular: Normal rate, regular rhythm and normal heart sounds. Exam reveals no gallop and no friction rub. No murmur heard. Pulmonary/Chest: Effort normal and breath sounds normal. No respiratory distress. He has no decreased breath sounds. He has no wheezes. He has no rhonchi. He has no rales. Abdominal: Soft. He exhibits no distension. There is tenderness (to palpations) in the right lower quadrant. There is no rebound and no guarding. Musculoskeletal: Normal range of motion. He exhibits no edema. Neurological: He is alert and oriented to person, place, and time. He has normal strength. No cranial nerve deficit or sensory deficit. He exhibits normal muscle tone. He displays no seizure activity. GCS eye subscore is 4. GCS verbal subscore is 5. GCS motor subscore is 6. Skin: Skin is warm and dry. No rash noted. He is not diaphoretic. Psychiatric: He has a normal mood and affect.  His behavior is normal. Thought content normal.   Nursing note and vitals within normal limits   URINE WITH REFLEXED MICRO - Abnormal; Notable for the following:     Blood, Urine TRACE (*)     Leukocyte Esterase, Urine LARGE (*)     Character, Urine CLOUDY (*)     All other components within normal limits   URINE CULTURE, REFLEXED    Narrative:     Source: urine, clean catch       Site: clean void          Current Antibiotics: none   LIPASE   BRAIN NATRIURETIC PEPTIDE   ANION GAP   GLOMERULAR FILTRATION RATE, ESTIMATED   TROPONIN       EMERGENCY DEPARTMENT COURSE:   Vitals:    Vitals:    09/24/17 2241 09/25/17 0004 09/25/17 0039 09/25/17 0120   BP: (!) 157/79 (!) 151/82 (!) 148/76 (!) 142/74   Pulse: 73 86 78 87   Resp: 16 17 15 17   Temp:       TempSrc:       SpO2: 99% 98% 99% 100%   Weight:           8:04 PM: The patient was seen and evaluated. MDM:  Patient seen and evaluated for chronic right lower quadrant pain. He does have a urethral sphincter. It is located in his right lower quadrant. There is some concern expressed by his urologist that this device is not working properly and possibly needs to be replaced. CT suspicious for possible abscess. UTI noted as well. We will start Rocephin and admitted to the urology service. CRITICAL CARE:   None     CONSULTS:  Ryan Parish CNP. PROCEDURES:  None     FINAL IMPRESSION      1. Right lower quadrant abdominal pain    2. Urinary tract infection with hematuria, site unspecified          DISPOSITION/PLAN   Admit    PATIENT REFERRED TO:  Etienne Pollard MD  [address] [place] [address] Drive  [phone]            DISCHARGE MEDICATIONS:  New Prescriptions    No medications on file       (Please note that portions of this note were completed with a voice recognition program.  Efforts were made to edit the dictations but occasionally words are mis-transcribed.)    Scribe: Evelia Omalley 9/24/17 8:04 PM Scribing for and in the presence of Evelia Omalley MD.    Signed by:  Kamini Arevalo, 09/25/17 1:28 AM    Provider:  I personally performed the services described in the documentation, reviewed and edited the documentation which was dictated to the scribe in my presence, and it accurately records my words and actions.     Bria Amaya MD 9/24/17 1:28 AM       Bria Amaya MD  09/25/17 0668

## 2017-09-25 NOTE — H&P
 NEPHROLITHOTOMY Left 8/15/2017    LEFT PERCUTANEOUS NEPHROLITHOTRIPSY performed by Gloria Tovar MD at [facility]  2002    skin flap bottom    SPINE SURGERY      Multiple    URETER SURGERY      replaced on left as kid    URETHRA SURGERY  02/17/2014    REVISION OF URETHRAL SPHINCTER       No current facility-administered medications on file prior to encounter. Current Outpatient Prescriptions on File Prior to Encounter   Medication Sig Dispense Refill    oxybutynin (DITROPAN) 5 MG tablet Take 5 mg by mouth 3 times daily      metoprolol tartrate (LOPRESSOR) 50 MG tablet Take 1 tablet by mouth 2 times daily . hold if SBP less than 100 mm hg or HR less than 60 60 tablet 3    amLODIPine (NORVASC) 10 MG tablet Take 1 tablet by mouth daily . hold if SBP less than 100 mm hg 30 tablet 3    traMADol (ULTRAM) 50 MG tablet Take by mouth every 6 hours as needed for Pain 1-2 tab      oxyCODONE-acetaminophen (PERCOCET)  MG per tablet Take 1 tablet by mouth every 8 hours as needed for Pain .  gabapentin (NEURONTIN) 800 MG tablet Take 800 mg by mouth 3 times daily      amitriptyline (ELAVIL) 75 MG tablet Take 75 mg by mouth nightly      fentaNYL (DURAGESIC) 25 MCG/HR Place 1 patch onto the skin every 72 hours .       pantoprazole (PROTONIX) 40 MG tablet TAKE 1 TABLET BY MOUTH TWICE DAILY 180 tablet 3       Allergies   Allergen Reactions    Asa Buff (Mag [Buffered Aspirin]      Tongue swelling    Prednisone      Insomnia, confusion, restless,       Social History   Substance Use Topics    Smoking status: Former Smoker     Packs/day: 1.00     Quit date: 8/1/2013    Smokeless tobacco: Never Used      Comment: using vapor no nicotine    Alcohol use Yes      Comment: rarely       Family History   Problem Relation Age of Onset    Diabetes Mother     High Blood Pressure Mother     Kidney Disease Mother     Cancer Neg Hx     Stroke Neg Hx     Heart Disease Neg Hx 09/24/2017    HCT 35.2 09/24/2017     09/24/2017    MCV 76.9 09/24/2017    MCH 25.0 09/24/2017    MCHC 32.5 09/24/2017    RDW 17.3 09/24/2017    NRBC 0 09/24/2017    NRBC 0 08/23/2011    SEGSPCT 79.5 09/24/2017    MONOPCT 8.5 09/24/2017    MONOSABS 0.8 09/24/2017    LYMPHSABS 1.0 09/24/2017    EOSABS 0.1 09/24/2017    BASOSABS 0.0 09/24/2017     BMP:    Lab Results   Component Value Date     09/24/2017    K 3.6 09/24/2017    CL 95 09/24/2017    CO2 24 09/24/2017    BUN 6 09/24/2017    LABALBU 3.3 09/24/2017    LABALBU 3.6 08/22/2011    CREATININE 0.7 09/24/2017    CALCIUM 8.8 09/24/2017    LABGLOM >90 09/24/2017    GLUCOSE 133 09/24/2017    GLUCOSE 83 05/31/2017           Radiology  The patient has had a CT With Contrast which I have reviewed along with its accompanying report. The study demonstrates bilateral hydronephrosis, persistent radiopaque density right lower quadrant with surrounding fluid, likely sphincter implant with mild enhancement of surrounding fluid which may represent a chronic seroma or hematoma    Assessment and Plan  Right lower abdominal pain  Urinary incontinence  History of renal calculi  History of Trasureteroureterostomy (TUU) of the left ureter   Urethral sphincter-s/p revision 2014    Discussed with Dr. Brian Guzman who is recommending a pelvic MRI. Order placed for prostate pillow as well to relieve pressure off his perineal/prostate region and pressure off the sphincter which will hopefully aid in incontinence improvement. He is use this while sitting up in wheelchair or recliner.     Pollo Georges CNP  9/25/2017 9:43 AM

## 2017-09-25 NOTE — PROCEDURES
A bladder scan was performed at 1200  Patients last void was at 2300  Residual amount was measured to be 53 ml  Report of results was given to [address] [place] [address]

## 2017-09-26 LAB
ORGANISM: ABNORMAL
URINE CULTURE REFLEX: ABNORMAL

## 2017-09-26 PROCEDURE — 1200000000 HC SEMI PRIVATE

## 2017-09-26 PROCEDURE — 2580000003 HC RX 258: Performed by: NURSE PRACTITIONER

## 2017-09-26 PROCEDURE — 6370000000 HC RX 637 (ALT 250 FOR IP): Performed by: NURSE PRACTITIONER

## 2017-09-26 PROCEDURE — 6360000002 HC RX W HCPCS: Performed by: UROLOGY

## 2017-09-26 PROCEDURE — 6360000002 HC RX W HCPCS: Performed by: NURSE PRACTITIONER

## 2017-09-26 PROCEDURE — 99232 SBSQ HOSP IP/OBS MODERATE 35: CPT | Performed by: NURSE PRACTITIONER

## 2017-09-26 RX ADMIN — METOPROLOL TARTRATE 50 MG: 50 TABLET, FILM COATED ORAL at 20:34

## 2017-09-26 RX ADMIN — SODIUM CHLORIDE: 9 INJECTION, SOLUTION INTRAVENOUS at 03:04

## 2017-09-26 RX ADMIN — GABAPENTIN 800 MG: 400 CAPSULE ORAL at 13:49

## 2017-09-26 RX ADMIN — MORPHINE SULFATE 4 MG: 2 INJECTION, SOLUTION INTRAMUSCULAR; INTRAVENOUS at 03:04

## 2017-09-26 RX ADMIN — GABAPENTIN 800 MG: 400 CAPSULE ORAL at 20:33

## 2017-09-26 RX ADMIN — MORPHINE SULFATE 4 MG: 2 INJECTION, SOLUTION INTRAMUSCULAR; INTRAVENOUS at 06:19

## 2017-09-26 RX ADMIN — MORPHINE SULFATE 4 MG: 2 INJECTION, SOLUTION INTRAMUSCULAR; INTRAVENOUS at 09:26

## 2017-09-26 RX ADMIN — METOPROLOL TARTRATE 50 MG: 50 TABLET, FILM COATED ORAL at 09:24

## 2017-09-26 RX ADMIN — GABAPENTIN 800 MG: 400 CAPSULE ORAL at 09:24

## 2017-09-26 RX ADMIN — PANTOPRAZOLE SODIUM 40 MG: 40 TABLET, DELAYED RELEASE ORAL at 20:34

## 2017-09-26 RX ADMIN — MORPHINE SULFATE 4 MG: 2 INJECTION, SOLUTION INTRAMUSCULAR; INTRAVENOUS at 18:37

## 2017-09-26 RX ADMIN — MORPHINE SULFATE 4 MG: 2 INJECTION, SOLUTION INTRAMUSCULAR; INTRAVENOUS at 15:28

## 2017-09-26 RX ADMIN — AMITRIPTYLINE HYDROCHLORIDE 75 MG: 75 TABLET, FILM COATED ORAL at 20:33

## 2017-09-26 RX ADMIN — PIPERACILLIN SODIUM,TAZOBACTAM SODIUM 3.38 G: 3; .375 INJECTION, POWDER, FOR SOLUTION INTRAVENOUS at 20:33

## 2017-09-26 RX ADMIN — OXYBUTYNIN CHLORIDE 5 MG: 5 TABLET ORAL at 13:48

## 2017-09-26 RX ADMIN — MORPHINE SULFATE 4 MG: 2 INJECTION, SOLUTION INTRAMUSCULAR; INTRAVENOUS at 21:44

## 2017-09-26 RX ADMIN — SODIUM CHLORIDE: 9 INJECTION, SOLUTION INTRAVENOUS at 19:33

## 2017-09-26 RX ADMIN — OXYBUTYNIN CHLORIDE 5 MG: 5 TABLET ORAL at 20:34

## 2017-09-26 RX ADMIN — PANTOPRAZOLE SODIUM 40 MG: 40 TABLET, DELAYED RELEASE ORAL at 09:24

## 2017-09-26 RX ADMIN — MORPHINE SULFATE 4 MG: 2 INJECTION, SOLUTION INTRAMUSCULAR; INTRAVENOUS at 12:26

## 2017-09-26 RX ADMIN — AMLODIPINE BESYLATE 10 MG: 10 TABLET ORAL at 09:25

## 2017-09-26 RX ADMIN — ENOXAPARIN SODIUM 40 MG: 40 INJECTION SUBCUTANEOUS at 09:27

## 2017-09-26 RX ADMIN — PIPERACILLIN SODIUM,TAZOBACTAM SODIUM 3.38 G: 3; .375 INJECTION, POWDER, FOR SOLUTION INTRAVENOUS at 12:40

## 2017-09-26 RX ADMIN — OXYBUTYNIN CHLORIDE 5 MG: 5 TABLET ORAL at 09:24

## 2017-09-26 RX ADMIN — MORPHINE SULFATE 4 MG: 2 INJECTION, SOLUTION INTRAMUSCULAR; INTRAVENOUS at 00:05

## 2017-09-26 ASSESSMENT — PAIN SCALES - GENERAL
PAINLEVEL_OUTOF10: 10
PAINLEVEL_OUTOF10: 7
PAINLEVEL_OUTOF10: 8
PAINLEVEL_OUTOF10: 7
PAINLEVEL_OUTOF10: 7
PAINLEVEL_OUTOF10: 9
PAINLEVEL_OUTOF10: 7
PAINLEVEL_OUTOF10: 10
PAINLEVEL_OUTOF10: 8
PAINLEVEL_OUTOF10: 6
PAINLEVEL_OUTOF10: 8
PAINLEVEL_OUTOF10: 6
PAINLEVEL_OUTOF10: 7
PAINLEVEL_OUTOF10: 7
PAINLEVEL_OUTOF10: 9
PAINLEVEL_OUTOF10: 6
PAINLEVEL_OUTOF10: 9

## 2017-09-26 ASSESSMENT — PAIN DESCRIPTION - PROGRESSION
CLINICAL_PROGRESSION: NOT CHANGED

## 2017-09-26 ASSESSMENT — PAIN DESCRIPTION - FREQUENCY
FREQUENCY: CONTINUOUS
FREQUENCY: CONTINUOUS

## 2017-09-26 ASSESSMENT — PAIN DESCRIPTION - ORIENTATION
ORIENTATION: RIGHT;LOWER

## 2017-09-26 ASSESSMENT — PAIN DESCRIPTION - DESCRIPTORS
DESCRIPTORS: ACHING;CONSTANT;SHARP
DESCRIPTORS: ACHING;SHARP

## 2017-09-26 ASSESSMENT — PAIN DESCRIPTION - DIRECTION
RADIATING_TOWARDS: BACK
RADIATING_TOWARDS: BACK

## 2017-09-26 ASSESSMENT — PAIN DESCRIPTION - PAIN TYPE
TYPE: ACUTE PAIN;CHRONIC PAIN
TYPE: CHRONIC PAIN;ACUTE PAIN
TYPE: ACUTE PAIN

## 2017-09-26 ASSESSMENT — PAIN DESCRIPTION - LOCATION
LOCATION: BACK;ABDOMEN
LOCATION: ABDOMEN;BACK
LOCATION: ABDOMEN

## 2017-09-26 ASSESSMENT — PAIN DESCRIPTION - ONSET
ONSET: ON-GOING
ONSET: ON-GOING

## 2017-09-26 NOTE — PLAN OF CARE
Problem: Pain:  Goal: Pain level will decrease  Pain level will decrease   Outcome: Ongoing  Pt receiving morphine 4 mg every 3 hours per orders. Some relief after administration. Goal: Control of acute pain  Control of acute pain   Outcome: Completed Date Met:  09/26/17  Goal: Control of chronic pain  Control of chronic pain   Outcome: Completed Date Met:  09/26/17    Problem: Falls - Risk of  Goal: Absence of falls  Outcome: Met This Shift  No falls this shift. Safety interventions maintained. Problem: Fluid Volume:  Goal: Maintenance of adequate hydration will improve  Maintenance of adequate hydration will improve   Outcome: Ongoing  Iv fluids running at 100 ml/hr. Problem: Urinary Elimination:  Goal: Will remain free from infection  Will remain free from infection   Outcome: Ongoing  Elevated temperature up to 100.3 orally. Problem: Physical Regulation:  Goal: Ability to maintain a body temperature in the normal range will improve  Ability to maintain a body temperature in the normal range will improve   Outcome: Met This Shift  Temperature elevated to 100.3 orally        Problem: DISCHARGE BARRIERS  Goal: Patients continuum of care needs are met  Outcome: Met This Shift  No discharge needs voiced from patient at this time. Patient expected to be discharged home. Comments:   Care plan reviewed with patient. Patient verbalizes understanding of the plan of care and contribute to goal setting.

## 2017-09-27 ENCOUNTER — APPOINTMENT (OUTPATIENT)
Dept: CT IMAGING | Age: 47
DRG: 373 | End: 2017-09-27
Payer: MEDICARE

## 2017-09-27 LAB
HCT VFR BLD CALC: 31.1 % (ref 42–52)
HEMOGLOBIN: 10.2 GM/DL (ref 14–18)
MCH RBC QN AUTO: 25.2 PG (ref 27–31)
MCHC RBC AUTO-ENTMCNC: 32.8 GM/DL (ref 33–37)
MCV RBC AUTO: 76.7 FL (ref 80–94)
PDW BLD-RTO: 17.1 % (ref 11.5–14.5)
PLATELET # BLD: 380 THOU/MM3 (ref 130–400)
PMV BLD AUTO: 7.7 MCM (ref 7.4–10.4)
RBC # BLD: 4.05 MILL/MM3 (ref 4.7–6.1)
WBC # BLD: 8.1 THOU/MM3 (ref 4.8–10.8)

## 2017-09-27 PROCEDURE — 10160 PNXR ASPIR ABSC HMTMA BULLA: CPT

## 2017-09-27 PROCEDURE — 87205 SMEAR GRAM STAIN: CPT

## 2017-09-27 PROCEDURE — 6360000002 HC RX W HCPCS: Performed by: NURSE PRACTITIONER

## 2017-09-27 PROCEDURE — 0W9G3ZX DRAINAGE OF PERITONEAL CAVITY, PERCUTANEOUS APPROACH, DIAGNOSTIC: ICD-10-PCS | Performed by: RADIOLOGY

## 2017-09-27 PROCEDURE — 36415 COLL VENOUS BLD VENIPUNCTURE: CPT

## 2017-09-27 PROCEDURE — 1200000000 HC SEMI PRIVATE

## 2017-09-27 PROCEDURE — 77012 CT SCAN FOR NEEDLE BIOPSY: CPT

## 2017-09-27 PROCEDURE — 6360000002 HC RX W HCPCS

## 2017-09-27 PROCEDURE — 6360000002 HC RX W HCPCS: Performed by: RADIOLOGY

## 2017-09-27 PROCEDURE — 2580000003 HC RX 258: Performed by: NURSE PRACTITIONER

## 2017-09-27 PROCEDURE — 85027 COMPLETE CBC AUTOMATED: CPT

## 2017-09-27 PROCEDURE — 6360000002 HC RX W HCPCS: Performed by: UROLOGY

## 2017-09-27 PROCEDURE — 87070 CULTURE OTHR SPECIMN AEROBIC: CPT

## 2017-09-27 PROCEDURE — 99024 POSTOP FOLLOW-UP VISIT: CPT | Performed by: NURSE PRACTITIONER

## 2017-09-27 PROCEDURE — 6370000000 HC RX 637 (ALT 250 FOR IP): Performed by: NURSE PRACTITIONER

## 2017-09-27 PROCEDURE — 87075 CULTR BACTERIA EXCEPT BLOOD: CPT

## 2017-09-27 PROCEDURE — A6198 ALGINATE DRESSING > 48 SQ IN: HCPCS

## 2017-09-27 RX ORDER — FENTANYL CITRATE 50 UG/ML
50 INJECTION, SOLUTION INTRAMUSCULAR; INTRAVENOUS ONCE
Status: COMPLETED | OUTPATIENT
Start: 2017-09-27 | End: 2017-09-27

## 2017-09-27 RX ORDER — FENTANYL CITRATE 50 UG/ML
25 INJECTION, SOLUTION INTRAMUSCULAR; INTRAVENOUS ONCE
Status: COMPLETED | OUTPATIENT
Start: 2017-09-27 | End: 2017-09-27

## 2017-09-27 RX ADMIN — MORPHINE SULFATE 4 MG: 2 INJECTION, SOLUTION INTRAMUSCULAR; INTRAVENOUS at 09:29

## 2017-09-27 RX ADMIN — MORPHINE SULFATE 4 MG: 2 INJECTION, SOLUTION INTRAMUSCULAR; INTRAVENOUS at 19:36

## 2017-09-27 RX ADMIN — OXYBUTYNIN CHLORIDE 5 MG: 5 TABLET ORAL at 09:28

## 2017-09-27 RX ADMIN — PIPERACILLIN SODIUM,TAZOBACTAM SODIUM 3.38 G: 3; .375 INJECTION, POWDER, FOR SOLUTION INTRAVENOUS at 04:03

## 2017-09-27 RX ADMIN — OXYBUTYNIN CHLORIDE 5 MG: 5 TABLET ORAL at 14:10

## 2017-09-27 RX ADMIN — GABAPENTIN 800 MG: 400 CAPSULE ORAL at 21:19

## 2017-09-27 RX ADMIN — SODIUM CHLORIDE: 9 INJECTION, SOLUTION INTRAVENOUS at 09:18

## 2017-09-27 RX ADMIN — OXYBUTYNIN CHLORIDE 5 MG: 5 TABLET ORAL at 21:19

## 2017-09-27 RX ADMIN — MORPHINE SULFATE 4 MG: 2 INJECTION, SOLUTION INTRAMUSCULAR; INTRAVENOUS at 22:37

## 2017-09-27 RX ADMIN — MORPHINE SULFATE 4 MG: 2 INJECTION, SOLUTION INTRAMUSCULAR; INTRAVENOUS at 01:47

## 2017-09-27 RX ADMIN — MORPHINE SULFATE 4 MG: 2 INJECTION, SOLUTION INTRAMUSCULAR; INTRAVENOUS at 14:07

## 2017-09-27 RX ADMIN — PIPERACILLIN SODIUM,TAZOBACTAM SODIUM 3.38 G: 3; .375 INJECTION, POWDER, FOR SOLUTION INTRAVENOUS at 13:06

## 2017-09-27 RX ADMIN — FENTANYL CITRATE 25 MCG: 50 INJECTION INTRAMUSCULAR; INTRAVENOUS at 12:39

## 2017-09-27 RX ADMIN — GABAPENTIN 800 MG: 400 CAPSULE ORAL at 09:28

## 2017-09-27 RX ADMIN — METOPROLOL TARTRATE 50 MG: 50 TABLET, FILM COATED ORAL at 09:28

## 2017-09-27 RX ADMIN — PANTOPRAZOLE SODIUM 40 MG: 40 TABLET, DELAYED RELEASE ORAL at 09:28

## 2017-09-27 RX ADMIN — AMLODIPINE BESYLATE 10 MG: 10 TABLET ORAL at 09:28

## 2017-09-27 RX ADMIN — FENTANYL CITRATE 50 MCG: 50 INJECTION INTRAMUSCULAR; INTRAVENOUS at 12:30

## 2017-09-27 RX ADMIN — ENOXAPARIN SODIUM 40 MG: 40 INJECTION SUBCUTANEOUS at 09:29

## 2017-09-27 RX ADMIN — METOPROLOL TARTRATE 50 MG: 50 TABLET, FILM COATED ORAL at 21:19

## 2017-09-27 RX ADMIN — GABAPENTIN 800 MG: 400 CAPSULE ORAL at 14:10

## 2017-09-27 RX ADMIN — PIPERACILLIN SODIUM,TAZOBACTAM SODIUM 3.38 G: 3; .375 INJECTION, POWDER, FOR SOLUTION INTRAVENOUS at 21:18

## 2017-09-27 RX ADMIN — PANTOPRAZOLE SODIUM 40 MG: 40 TABLET, DELAYED RELEASE ORAL at 21:33

## 2017-09-27 ASSESSMENT — PAIN DESCRIPTION - ORIENTATION
ORIENTATION: RIGHT;MID;LOWER
ORIENTATION: RIGHT;LOWER

## 2017-09-27 ASSESSMENT — PAIN DESCRIPTION - LOCATION
LOCATION: ABDOMEN;BACK
LOCATION: ABDOMEN
LOCATION: ABDOMEN;GENERALIZED

## 2017-09-27 ASSESSMENT — PAIN SCALES - GENERAL
PAINLEVEL_OUTOF10: 9
PAINLEVEL_OUTOF10: 10
PAINLEVEL_OUTOF10: 8
PAINLEVEL_OUTOF10: 2
PAINLEVEL_OUTOF10: 6
PAINLEVEL_OUTOF10: 6
PAINLEVEL_OUTOF10: 10
PAINLEVEL_OUTOF10: 10
PAINLEVEL_OUTOF10: 8
PAINLEVEL_OUTOF10: 8
PAINLEVEL_OUTOF10: 10
PAINLEVEL_OUTOF10: 10
PAINLEVEL_OUTOF10: 8

## 2017-09-27 ASSESSMENT — PAIN DESCRIPTION - PAIN TYPE
TYPE: ACUTE PAIN;CHRONIC PAIN
TYPE: ACUTE PAIN
TYPE: ACUTE PAIN

## 2017-09-27 ASSESSMENT — PAIN DESCRIPTION - DESCRIPTORS
DESCRIPTORS: ACHING;DISCOMFORT
DESCRIPTORS: DISCOMFORT

## 2017-09-27 ASSESSMENT — PAIN DESCRIPTION - DIRECTION: RADIATING_TOWARDS: RIGHT FLANK, BACK

## 2017-09-27 ASSESSMENT — PAIN DESCRIPTION - PROGRESSION
CLINICAL_PROGRESSION: NOT CHANGED
CLINICAL_PROGRESSION: NOT CHANGED

## 2017-09-27 ASSESSMENT — PAIN DESCRIPTION - FREQUENCY
FREQUENCY: CONTINUOUS
FREQUENCY: CONTINUOUS

## 2017-09-27 ASSESSMENT — PAIN DESCRIPTION - ONSET
ONSET: ON-GOING
ONSET: ON-GOING

## 2017-09-27 NOTE — PLAN OF CARE
Problem: Pain:  Goal: Pain level will decrease  Pain level will decrease   Outcome: Ongoing  Patient has chronic back pain, and rates the back pain a \"8-9\" on a 0-10 pain scale. Patient also has abdominal and RLQ pain that radiates to the back; pain is rated a \"7-8\" on a 0-10 pain scale throughout most of the shift. Problem: Falls - Risk of  Goal: Absence of falls  Outcome: Met This Shift  Patient has absence of falls during this shift. Problem: Fluid Volume:  Goal: Maintenance of adequate hydration will improve  Maintenance of adequate hydration will improve   Outcome: Ongoing  Patient has continuous IV fluids infusing at 100 mL/HR and is also drinking plenty of water and other liquids. Problem: Urinary Elimination:  Goal: Will remain free from infection  Will remain free from infection   Outcome: Ongoing  Patient has been afebrile during this shift and continues with the IV antibiotic zosyn Q8 hours. Problem: Physical Regulation:  Goal: Ability to maintain a body temperature in the normal range will improve  Ability to maintain a body temperature in the normal range will improve   Outcome: Completed Date Met:  09/27/17    Problem: DISCHARGE BARRIERS  Goal: Patients continuum of care needs are met  Outcome: Ongoing  No discharge date has been established at this time. Patient is expected to return to home upon discharge from hospitalization. Comments:   Care plan reviewed with patient, who verbalized understanding of the plan of care and contributes to goal setting.

## 2017-09-28 VITALS
RESPIRATION RATE: 20 BRPM | BODY MASS INDEX: 24.03 KG/M2 | HEIGHT: 66 IN | DIASTOLIC BLOOD PRESSURE: 51 MMHG | WEIGHT: 149.5 LBS | HEART RATE: 66 BPM | SYSTOLIC BLOOD PRESSURE: 104 MMHG | OXYGEN SATURATION: 97 % | TEMPERATURE: 98.1 F

## 2017-09-28 PROCEDURE — 2580000003 HC RX 258: Performed by: NURSE PRACTITIONER

## 2017-09-28 PROCEDURE — 6370000000 HC RX 637 (ALT 250 FOR IP): Performed by: UROLOGY

## 2017-09-28 PROCEDURE — 6360000002 HC RX W HCPCS: Performed by: NURSE PRACTITIONER

## 2017-09-28 PROCEDURE — 99238 HOSP IP/OBS DSCHRG MGMT 30/<: CPT | Performed by: NURSE PRACTITIONER

## 2017-09-28 PROCEDURE — 6360000002 HC RX W HCPCS: Performed by: UROLOGY

## 2017-09-28 PROCEDURE — 6370000000 HC RX 637 (ALT 250 FOR IP): Performed by: NURSE PRACTITIONER

## 2017-09-28 RX ORDER — AMOXICILLIN AND CLAVULANATE POTASSIUM 875; 125 MG/1; MG/1
1 TABLET, FILM COATED ORAL 2 TIMES DAILY
Qty: 20 TABLET | Refills: 0 | Status: SHIPPED | OUTPATIENT
Start: 2017-09-28 | End: 2017-10-08

## 2017-09-28 RX ADMIN — ENOXAPARIN SODIUM 40 MG: 40 INJECTION SUBCUTANEOUS at 08:08

## 2017-09-28 RX ADMIN — MORPHINE SULFATE 4 MG: 2 INJECTION, SOLUTION INTRAMUSCULAR; INTRAVENOUS at 12:04

## 2017-09-28 RX ADMIN — ACETAMINOPHEN 650 MG: 325 TABLET ORAL at 04:36

## 2017-09-28 RX ADMIN — MORPHINE SULFATE 4 MG: 2 INJECTION, SOLUTION INTRAMUSCULAR; INTRAVENOUS at 04:36

## 2017-09-28 RX ADMIN — MORPHINE SULFATE 4 MG: 2 INJECTION, SOLUTION INTRAMUSCULAR; INTRAVENOUS at 01:44

## 2017-09-28 RX ADMIN — GABAPENTIN 800 MG: 400 CAPSULE ORAL at 08:08

## 2017-09-28 RX ADMIN — OXYBUTYNIN CHLORIDE 5 MG: 5 TABLET ORAL at 08:08

## 2017-09-28 RX ADMIN — PIPERACILLIN SODIUM,TAZOBACTAM SODIUM 3.38 G: 3; .375 INJECTION, POWDER, FOR SOLUTION INTRAVENOUS at 04:25

## 2017-09-28 RX ADMIN — MORPHINE SULFATE 4 MG: 2 INJECTION, SOLUTION INTRAMUSCULAR; INTRAVENOUS at 08:09

## 2017-09-28 RX ADMIN — SODIUM CHLORIDE: 9 INJECTION, SOLUTION INTRAVENOUS at 08:13

## 2017-09-28 RX ADMIN — PANTOPRAZOLE SODIUM 40 MG: 40 TABLET, DELAYED RELEASE ORAL at 08:08

## 2017-09-28 ASSESSMENT — PAIN SCALES - GENERAL
PAINLEVEL_OUTOF10: 9
PAINLEVEL_OUTOF10: 8
PAINLEVEL_OUTOF10: 9
PAINLEVEL_OUTOF10: 8
PAINLEVEL_OUTOF10: 7
PAINLEVEL_OUTOF10: 7
PAINLEVEL_OUTOF10: 8
PAINLEVEL_OUTOF10: 6

## 2017-09-28 ASSESSMENT — PAIN DESCRIPTION - LOCATION: LOCATION: ABDOMEN;BACK

## 2017-09-28 ASSESSMENT — PAIN DESCRIPTION - ONSET: ONSET: ON-GOING

## 2017-09-28 ASSESSMENT — PAIN DESCRIPTION - DESCRIPTORS: DESCRIPTORS: DISCOMFORT

## 2017-09-28 ASSESSMENT — PAIN DESCRIPTION - PAIN TYPE
TYPE: ACUTE PAIN
TYPE: ACUTE PAIN

## 2017-09-28 ASSESSMENT — PAIN DESCRIPTION - ORIENTATION: ORIENTATION: RIGHT;MID;LOWER

## 2017-09-28 ASSESSMENT — PAIN DESCRIPTION - FREQUENCY: FREQUENCY: CONTINUOUS

## 2017-09-28 ASSESSMENT — PAIN DESCRIPTION - PROGRESSION: CLINICAL_PROGRESSION: NOT CHANGED

## 2017-09-28 NOTE — CARE COORDINATION
9/27/17, 2:13 PM      Marleny Haq. Hospital day: 2  Location: 6E-57/057-A Reason for admit: UTI (urinary tract infection) [N39.0]   Treatment Plan of Care: REmains on IVF, IV ATB, pain control. To have IR drainage of abscess today. Core Measures: vte  PCP: Kiran Villar MD  Discharge Plan: Plans return home alone. Leann PRIETO has provided CM with name and phone number of a local company that supplies REach. Information provided to Max Garcia.
9/28/17 11:50 AM   Discharge order received. Attempted to review IMM with Dimas Sarabia, he is sleeping and does not awake despite CM stating his name several times. Will check back.
patient.     [address] [facility] Transition Coordinator  [phone]

## 2017-09-28 NOTE — DISCHARGE SUMMARY
atraumatic. Mouth/Throat:         Mucous membranes are moist and intact. Eyes:         EOM are intact. No scleral icterus. PERRLA. Cardiovascular:        Normal rate, regular rhythm, S1 S2 heart sounds. No murmurs, rub, or gallops. Pulmonary/Chest:      Chest symmetric with normal A/P diameter,  CTA with no wheezes, rales, or rhonchi noted. Normal respiratory rate and rhthym. No use of accessory muscles. Abdominal:         Soft. Mild right and lower abdominal tenderness. No rebound, no guarding and no CVA tenderness. Bowel sounds active. Percutaneous incision intact, no leakage or erythema noted in right lateral abdominal region  Musculoskeletal:         Normal range of motion. No edema or tenderness of lower extremities noted. Psychiatric:        Normal mood and affect. CBC with Differential:    Lab Results   Component Value Date    WBC 8.1 09/27/2017    RBC 4.05 09/27/2017    RBC 3.66 08/23/2011    HGB 10.2 09/27/2017    HCT 31.1 09/27/2017     09/27/2017    MCV 76.7 09/27/2017    MCH 25.2 09/27/2017    MCHC 32.8 09/27/2017    RDW 17.1 09/27/2017    NRBC 0 09/24/2017    NRBC 0 08/23/2011    SEGSPCT 79.5 09/24/2017    MONOPCT 8.5 09/24/2017    MONOSABS 0.8 09/24/2017    LYMPHSABS 1.0 09/24/2017    EOSABS 0.1 09/24/2017    BASOSABS 0.0 09/24/2017     BMP:    Lab Results   Component Value Date     09/24/2017    K 3.6 09/24/2017    CL 95 09/24/2017    CO2 24 09/24/2017    BUN 6 09/24/2017    LABALBU 3.3 09/24/2017    LABALBU 3.6 08/22/2011    CREATININE 0.7 09/24/2017    CALCIUM 8.8 09/24/2017    LABGLOM >90 09/24/2017    GLUCOSE 133 09/24/2017    GLUCOSE 83 05/31/2017         Consults: none    Surgery: CT guided aspiration of a focal abscess right lower abdomen    Patient Instructions: Activity: as tolerated  Diet: As tolerated  Follow-up with Dr. Amado Rose for cystoscopy .     Hospital course: Patient presented with right abdominal pain and fever, MRI completed showing stable complex fluid collection tracking from the neobladder to the transversus abdominis and internal oblique musculature along the right lower abdomen suspicious for an abscess containing what appears to be a foreign body. He underwent CT guided aspiration of a focal abscess right lower abdomen without complications. Patient is tolerating diet, pain is minimal, ambulating well with assistance, having flatus.       Time spent for discharge 22 minutes    Susie Georges CNP  9/28/2017 9:00 AM

## 2017-09-28 NOTE — DISCHARGE INSTR - DIET

## 2017-09-28 NOTE — PLAN OF CARE
Problem: Pain:  Goal: Pain level will decrease  Pain level will decrease   Outcome: Ongoing  Patient pain level has not improved, as he has chronic back pain and acute abdominal pain that he rates a \"7-10\" on a 0-10 pain scale. Patient continues to take IV morphine and is wearing a fentanyl patch. Problem: Falls - Risk of  Goal: Absence of falls  Outcome: Met This Shift  Patient did not have any falls during this shift. Problem: Fluid Volume:  Goal: Maintenance of adequate hydration will improve  Maintenance of adequate hydration will improve   Outcome: Ongoing  Patient continues with IV fluid therapy. Patient not drinking as much, stating, \"I'm gonna stop drinking because I keep peeing myself. And I need to stop eating so my bowels will slow down. \"    Problem: Urinary Elimination:  Goal: Will remain free from infection  Will remain free from infection   Outcome: Ongoing  Patient continues with IV antibiotic therapy. Vital signs are WNL with the exception of low-grade temp of 99.3 at midnight and at 0430. Problem: DISCHARGE BARRIERS  Goal: Patients continuum of care needs are met  Outcome: Ongoing  No discharge date has been established at this time, but patient discussed that he will be going back home. Comments:   Care plan reviewed with patient, who verbalized understanding of the plan of care and contributes to goal setting.

## 2017-09-28 NOTE — PROGRESS NOTES
1210 Patient received in CT area for procedure. Consent was obtained earlier  [id number] Patient on CT table preparing for pre scan  1222 Pre scan started  1229 Procedure started with Dr. Mark Camara. 1244 Procedure completed patient tolerated fair. Band aid to site no bleeding noted area soft. Culture collected and will be sent to lab for further studies total of 2ml of purulent drainage  1246 Post scan obtained  1248 Patient on bed taken to 6E via bed with transport.  Attempted to call report will call back
9537 Patient received in Ct hold room for procedure. 7222 Dr. Janet Hein here spoke to patient. This procedure has been fully reviewed with the patient and written informed consent has been obtained. Dr. Janet Hein spoke to patient extensively about procedure and expectations patient stated he thought we were removing foreign body. Patient stated he has been told numerous things and is confused about what is happening and would like to speak to Dr. Evangelist Braswell before proceeding.   4861 Patient taken to  via bed  0858 B Irvin NP called and transferred to Dr. Janet Hein
Alerted by iodine of patient having a colostomy. Patient known to this nurse from previous hospital stays. Patient has had colostomy since childhood and does not pouch the stoma. Patient prefers to apply gauze over the stoma and states he is aware when he is about to have bowel movement and \"goes into the restroom and takes care of it. \"  Patient does admit that when his stools are loose or if episode of diarrhea it is \"more of a problem\". Patient states he is very private and very few people know he has a colostomy and \"I want to keep it that way. \"   Patient is agreeable to having some samples of colostomy supplies to try the next time he has loose stools. Obtained coloplast one piece cut to fit josé miguel drainable pouches and 1 piece cut to fit disposable pouches for patient. Supplies placed in dark bag and placed in the corner of the upper shelf of the closet due to patient fearing someone will see them. Offered emotional support regarding life with ostomy and that support groups exist.  Patient denies any need to discuss his colostomy with anyone. Will cont to follow.
Biju Alanis NP called to check on patient, updated that patient was upset that he had not seen Dr. Brenden Rousseau this entire stay and has not had his questions answered. Biju Alanis said that she would let Dr. Brenden Rousseau know and he may be able to come and talk to patient but that it may be much later in the day.  [facility]
Discussed with Dr. Kelsey Garcia. Patient will go down to IR tomorrow for right lower abdominal abscess drainage. Drainage to be sent for culture.
Discussed with patient that Christina Escobar NP said he was ok to go home, she would let Dr. Flower Her know that he had questions but that she was unsure what time he could come see him. Patient states that he does not want to wait, he is ready to go. He is asking for morphine before he leaves. Explained that he could not drive home and that he would have to wait for at least an hour after morphine given before he could leave. Patient voiced that he was ok with that.   Hien Likes
Patient arrived to 6E 57 from ED via cart. INT to [address] Oriented patient to unit, room, and plan of care.
Patient just wants to sleep and not be bothered, states that he does not want to eat. Patient states that he has not had any of his questions answered while he has been here and he just wants to see Dr. Jovita Gaucher. Nurse asked what she could do to help or get his questions answered, but he said he will just talk to Dr. Jovita Gaucher whenever he gets to see him and there is nothing else he needs.   Ginny Hawley
Dr. Schuler Heads on 9/25/2017 7:25 PM       Impression:  Right lower abdominal pain  Urinary incontinence  History of renal calculi  History of Trasureteroureterostomy (TUU) of the left ureter   Urethral sphincter-s/p revision 2014    Plan:    Discussed with Jean-Pierre Lyons that the plan is to go to IR today to have abscess drained, drain will be taken out. We will send fluid for culture to see if it grows infection and to make sure he is placed on appropriate antibiotics. This was explained to Jean-Pierre Lyons and he agrees with the plan. He was given a chance to ask questions and all questions were answered. At a later date, once infection has been treated, we can start to plan removal of reservoir from previous sphincter. Continue Zosyn. Will get aerobic & anaerobic cultures when drain is placed.     [email or web address] [place]  9/27/2017 11:34 AM  Urology
inherent in voice recognition technology. **       Final report electronically signed by Dr. Teresa Sotelo on 9/25/2017 7:25 PM       Impression:    Right lower abdominal pain  Urinary incontinence  History of renal calculi  History of Trasureteroureterostomy (TUU) of the left ureter   Urethral sphincter-s/p revision 2014    Plan:    MRI discussed with Dr. Paul Virk. We will see if IR is able to place a drain in the abscess with the foreign body (foreign body is likely reservoir from previous sphincter that is no longer being used). Start Zosyn. Will get aerobic & anaerobic cultures when drain is placed.     Mary Mckinley  9/26/2017 9:28 AM

## 2017-09-29 ENCOUNTER — TELEPHONE (OUTPATIENT)
Dept: FAMILY MEDICINE CLINIC | Age: 47
End: 2017-09-29

## 2017-09-29 ENCOUNTER — TELEPHONE (OUTPATIENT)
Dept: PHARMACY | Age: 47
End: 2017-09-29

## 2017-09-29 NOTE — TELEPHONE ENCOUNTER
Pt was admitted to [facility] on 9/25-9/28/17 for abscess right lower abdomen and uti with hematuria. Called pt on home and cell received voicemail. Left a message for him to call back on Monday. He is scheduled to see Dr. Balwinder Kitchen on 10/2/17 for cysto procedure.

## 2017-09-29 NOTE — TELEPHONE ENCOUNTER
CLINICAL PHARMACY CONSULTATION: Transition of Care ([facility])  -----------------------------------------------------------------------------------------------  Laney Aliza is a 52 y.o. male  who was discharged from [facility] on 9/28/17 with a diagnosis of right lower abdominal abscess    Attempt made to contact pt. Left msg on home [phone] TAD requesting call back at [phone]. Will continue to attempt to contact pt as appropriate.     Stephanie Landaverde, PharmD 9/29/2017 3:07 PM

## 2017-10-01 NOTE — PROGRESS NOTES
Mr. Leobardo Kinsey was seen in follow up after his recent PCNL. He currently has a catheter in place. This was placed when he was having some difficulty in voiding after his procedure. He has a sphincter in his sphincter had been activated prior to this catheter placement. He is not having any pain or other discomfort. We will remove his catheter today. We will then see him back in a few weeks. Past Medical History:   Diagnosis Date    Arthritis     Chondromatosis     lower spine tumor    Depression     GERD (gastroesophageal reflux disease)     Hypertension     Insomnia     Lower paraplegia (HCC)     ankles down    Meningitis spinal     at 8years old    PONV (postoperative nausea and vomiting)        Past Surgical History:   Procedure Laterality Date    ABDOMEN SURGERY      BACK SURGERY      BLADDER SURGERY      sphincter augmentation   ARTIFICIAL SPHINCTER MRI SAFE 1.5T    COLONOSCOPY      COLOSTOMY      DILATATION, ESOPHAGUS      ENDOSCOPY, COLON, DIAGNOSTIC      FRACTURE SURGERY      NEPHROLITHOTOMY Left 8/15/2017    LEFT PERCUTANEOUS NEPHROLITHOTRIPSY performed by Dandy Greer MD at [facility] [address]    skin flap bottom    SPINE SURGERY      Multiple    URETER SURGERY      replaced on left as kid    URETHRA SURGERY  02/17/2014    REVISION OF URETHRAL SPHINCTER       Current Outpatient Prescriptions on File Prior to Visit   Medication Sig Dispense Refill    amLODIPine (NORVASC) 10 MG tablet Take 1 tablet by mouth daily . hold if SBP less than 100 mm hg 30 tablet 3    traMADol (ULTRAM) 50 MG tablet Take by mouth every 6 hours as needed for Pain 1-2 tab      oxyCODONE-acetaminophen (PERCOCET)  MG per tablet Take 1 tablet by mouth every 8 hours as needed for Pain .       gabapentin (NEURONTIN) 800 MG tablet Take 800 mg by mouth 3 times daily      amitriptyline (ELAVIL) 75 MG tablet Take 75 mg by mouth nightly      fentaNYL (DURAGESIC) 25 MCG/HR Place 1 patch onto the skin every 72 hours .  pantoprazole (PROTONIX) 40 MG tablet TAKE 1 TABLET BY MOUTH TWICE DAILY 180 tablet 3    metoprolol tartrate (LOPRESSOR) 50 MG tablet Take 1 tablet by mouth 2 times daily . hold if SBP less than 100 mm hg or HR less than 60 60 tablet 3     No current facility-administered medications on file prior to visit. Allergies   Allergen Reactions    Asa Buff (Mag [Buffered Aspirin]      Tongue swelling    Prednisone      Insomnia, confusion, restless,       Family History   Problem Relation Age of Onset    Diabetes Mother     High Blood Pressure Mother     Kidney Disease Mother     Cancer Neg Hx     Stroke Neg Hx     Heart Disease Neg Hx        Social History     Social History    Marital status:      Spouse name: N/A    Number of children: N/A    Years of education: N/A     Occupational History    Not on file. Social History Main Topics    Smoking status: Former Smoker     Packs/day: 1.00     Quit date: 8/1/2013    Smokeless tobacco: Never Used      Comment: using vapor no nicotine    Alcohol use Yes      Comment: rarely    Drug use: No    Sexual activity: Not Currently     Other Topics Concern    Not on file     Social History Narrative       Review of Systems  No problems with ears, nose or throat. No problems with eyes. No chest pain, shortness of breath, abdominal pain, extremity pain or weakness, and no neurological deficits. No rashes. No swollen glands or lymph nodes.  symptoms per HPI. The remainder of the review of symptoms is negative. Exam  Constitutional: oriented to person, place, and time. Vital signs are normal. appears well-developed and well-nourished. cooperative. No distress. HENT:    Head: Normocephalic and atraumatic.    Mouth/Throat: Oropharynx is clear and moist and mucous membranes are normal. No oropharyngeal exudate. Eyes: EOM are normal. Pupils are equal, round, and reactive to light.  Right eye exhibits no discharge. Left eye exhibits no discharge. No scleral icterus. Neck: Trachea normal. No JVD present. No tracheal deviation present. Cardiovascular: Normal rate and regular rhythm. Pulmonary/Chest: Effort normal. No respiratory distress. no wheezes. Abdominal: Soft. exhibits no distension. There is no tenderness. There is no rebound and no CVA tenderness. Musculoskeletal: no edema or tenderness. Lymphadenopathy:   Right: No supraclavicular adenopathy present. Left: No supraclavicular adenopathy present. Neurological: alert and oriented to person, place, and time. No cranial nerve deficit. Skin: Skin is warm and dry. not diaphoretic. Psychiatric: normal mood and affect. behavior is normal.   Nursing note and vitals reviewed. Labs    No results found for this visit on 08/31/17. Lab Results   Component Value Date    CREATININE 0.7 09/24/2017    BUN 6 (L) 09/24/2017     (L) 09/24/2017    K 3.6 09/24/2017    CL 95 (L) 09/24/2017    CO2 24 09/24/2017       No results found for: PSA      Plan: [place] removed today. Follow-up in 2-3 weeks for cystoscopy.

## 2017-10-02 ENCOUNTER — PROCEDURE VISIT (OUTPATIENT)
Dept: UROLOGY | Age: 47
End: 2017-10-02
Payer: MEDICARE

## 2017-10-02 ENCOUNTER — CARE COORDINATION (OUTPATIENT)
Dept: CASE MANAGEMENT | Age: 47
End: 2017-10-02

## 2017-10-02 VITALS — DIASTOLIC BLOOD PRESSURE: 92 MMHG | SYSTOLIC BLOOD PRESSURE: 142 MMHG

## 2017-10-02 DIAGNOSIS — K65.1 PELVIC ABSCESS IN MALE (HCC): ICD-10-CM

## 2017-10-02 DIAGNOSIS — N30.00 ACUTE CYSTITIS WITHOUT HEMATURIA: Primary | ICD-10-CM

## 2017-10-02 DIAGNOSIS — N20.0 LEFT NEPHROLITHIASIS: ICD-10-CM

## 2017-10-02 LAB
AEROBIC CULTURE: NORMAL
ANAEROBIC CULTURE: NORMAL
BILIRUBIN URINE: NEGATIVE
BLOOD URINE, POC: NEGATIVE
CHARACTER, URINE: CLEAR
COLOR, URINE: YELLOW
GLUCOSE URINE: NEGATIVE MG/DL
GRAM STAIN RESULT: NORMAL
KETONES, URINE: NEGATIVE
LEUKOCYTE CLUMPS, URINE: NORMAL
NITRITE, URINE: NEGATIVE
PH, URINE: 7
PROTEIN, URINE: NORMAL MG/DL
SPECIFIC GRAVITY, URINE: 1.01 (ref 1–1.03)
UROBILINOGEN, URINE: 1 EU/DL

## 2017-10-02 PROCEDURE — G8484 FLU IMMUNIZE NO ADMIN: HCPCS | Performed by: UROLOGY

## 2017-10-02 PROCEDURE — 1036F TOBACCO NON-USER: CPT | Performed by: UROLOGY

## 2017-10-02 PROCEDURE — 52000 CYSTOURETHROSCOPY: CPT | Performed by: UROLOGY

## 2017-10-02 PROCEDURE — 81003 URINALYSIS AUTO W/O SCOPE: CPT | Performed by: UROLOGY

## 2017-10-02 PROCEDURE — 99214 OFFICE O/P EST MOD 30 MIN: CPT | Performed by: UROLOGY

## 2017-10-02 PROCEDURE — G8427 DOCREV CUR MEDS BY ELIG CLIN: HCPCS | Performed by: UROLOGY

## 2017-10-02 PROCEDURE — 1111F DSCHRG MED/CURRENT MED MERGE: CPT | Performed by: UROLOGY

## 2017-10-02 PROCEDURE — G8420 CALC BMI NORM PARAMETERS: HCPCS | Performed by: UROLOGY

## 2017-10-02 NOTE — CARE COORDINATION
[facility] Transitions Initial Follow Up Call    Call within 2 business days of discharge: No    Patient: Jayshree Paredes. Patient : 1970   MRN: [id number]  Reason for Admission: There are no discharge diagnoses documented for the most recent discharge. Discharge Date: 17 RARS: [facility] Risk Score: 21.5    First attempt to reach pt for the care transition initial follow up call. ,Left message to call transition care coordinator from [facility] @ [phone].       Follow Up  Future Appointments  Date Time Provider Karel Wiseman   10/2/2017 9:45 AM MD BRIANA Cuellar AM [address].[facility] Urology P - [address] [facility] Transition Coordinator  [phone]

## 2017-10-02 NOTE — TELEPHONE ENCOUNTER
CLINICAL PHARMACY CONSULTATION: Transition of Care ([facility])  -----------------------------------------------------------------------------------------------  Poonam Perry is a 52 y.o. male  who was discharged from [address] on 9/28/17 with a diagnosis of right lower abdominal abscess. Attempt made to contact pt. Left msg on home [phone] TAD requesting call back at [phone].      Matt Vázquez PharmD  10/2/2017 9:08 AM

## 2017-10-02 NOTE — MR AVS SNAPSHOT
Left nephrolithiasis    Right lower quadrant abdominal pain    Dizziness    GERD (gastroesophageal reflux disease) (Chronic)    Lower paraplegia (HCC) (Chronic)    Chondromatosis (Chronic)      Immunizations as of 10/2/2017     Name Date    Influenza Virus Vaccine 12/1/2015, 10/11/2013, 12/4/2012    Influenza Whole 9/26/2011    Influenza, High Dose 10/1/2009    Influenza, Shanthi Smith, 3 Years and older, IM 11/1/2016      Preventive Care        Date Due    HIV screening is recommended for all people regardless of risk factors  aged 15-65 years at least once (lifetime) who have never been HIV tested. 1/23/1985    Tetanus Combination Vaccine (1 - Tdap) 1/23/1989    Yearly Flu Vaccine (1) 9/1/2017    Cholesterol Screening 5/31/2022            [facility] [place] [employer] Signup           [employer] allows you to send messages to your doctor, view your test results, renew your prescriptions, schedule appointments, view visit notes, and more. How Do I Sign Up? 1. In your Internet browser, go to [email or web address]  2. Click on the Sign Up Now link in the Sign In box. You will see the New Member Sign Up page. 3. Enter your FaceBuzz Access Code exactly as it appears below. You will not need to use this code after youve completed the sign-up process. If you do not sign up before the expiration date, you must request a new code. Face[employer]zz Access Code: Betty Bound  Expires: 10/16/2017  2:30 PM    4. Enter your Social Security Number ([id number]) and Date of Birth (mm/dd/yyyy) as indicated and click Submit. You will be taken to the next sign-up page. 5. Create a FaceBuzz ID. This will be your FaceBuzz login ID and cannot be changed, so think of one that is secure and easy to remember. 6. Create a FaceBuzz password. You can change your password at any time. 7. Enter your Password Reset Question and Answer. This can be used at a later time if you forget your password. 8. Enter your e-mail address. You will receive e-mail notification when new information is available in [address]. 9. Click Sign Up. You can now view your medical record. Additional Information  If you have questions, please contact the physician practice where you receive care. Remember, [employer] is NOT to be used for urgent needs. For medical emergencies, dial 911. For questions regarding your [employer] account call [phone]. If you have a clinical question, please call your doctor's office.

## 2017-10-03 NOTE — CARE COORDINATION
[facility] Transitions Initial Follow Up Call    Call within 2 business days of discharge: Yes    Patient: Ana Rojas. Patient : 1970   MRN: [id number]  Reason for Admission: There are no discharge diagnoses documented for the most recent discharge. Discharge Date: 17 RARS: [facility] Risk Score: 21.5     Second attempt to reach pt for the transtion of care initial follow up call. Left message to call transition care coordinator from [address] [place] [address] Avenue @ [phone].     Follow Up  Future Appointments  Date Time Provider Karel Wiseman   10/6/2017 1:00 PM STR CT IMAGING RM1  [facility] OUT EXP STR Radiolog       Diana Holbrook RN  Care Transition Coordinator  [phone]

## 2017-10-06 ENCOUNTER — HOSPITAL ENCOUNTER (OUTPATIENT)
Dept: CT IMAGING | Age: 47
Discharge: HOME | End: 2017-10-06
Payer: MEDICARE

## 2017-10-06 DIAGNOSIS — K65.1 PELVIC ABSCESS IN MALE (HCC): ICD-10-CM

## 2017-10-06 PROCEDURE — 72193 CT PELVIS W/DYE: CPT

## 2017-10-06 PROCEDURE — 6360000004 HC RX CONTRAST MEDICATION: Performed by: UROLOGY

## 2017-10-06 RX ADMIN — IOPAMIDOL 85 ML: 755 INJECTION, SOLUTION INTRAVENOUS at 13:02

## 2017-10-07 NOTE — PROGRESS NOTES
review of symptoms is negative. Exam  Constitutional: oriented to person, place, and time. Vital signs are normal. appears well-developed and well-nourished. cooperative. No distress. HENT:    Head: Normocephalic and atraumatic.    Mouth/Throat: Oropharynx is clear and moist and mucous membranes are normal. No oropharyngeal exudate. Eyes: EOM are normal. Pupils are equal, round, and reactive to light. Right eye exhibits no discharge. Left eye exhibits no discharge. No scleral icterus. Neck: Trachea normal. No JVD present. No tracheal deviation present. Cardiovascular: Normal rate and regular rhythm. Pulmonary/Chest: Effort normal. No respiratory distress. no wheezes. Abdominal: Soft. exhibits no distension. There is no tenderness. There is no rebound and no CVA tenderness. Musculoskeletal: no edema or tenderness. Lymphadenopathy:   Right: No supraclavicular adenopathy present. Left: No supraclavicular adenopathy present. Neurological: alert and oriented to person, place, and time. No cranial nerve deficit. Skin: Skin is warm and dry. not diaphoretic. Psychiatric: normal mood and affect. behavior is normal.   Nursing note and vitals reviewed.     Labs    Results for POC orders placed in visit on 10/02/17   POCT Urinalysis No Micro (Auto)   Result Value Ref Range    Glucose, Ur Negative NEGATIVE mg/dl    Bilirubin Urine Negative     Ketones, Urine Negative NEGATIVE    Specific Gravity, Urine 1.015 1.002 - 1.03    Blood, UA POC Negative NEGATIVE    pH, Urine 7.00 5.0 - 9.0    Protein, Urine Trace NEGATIVE mg/dl    Urobilinogen, Urine 1.00 0.0 - 1.0 eu/dl    Nitrite, Urine Negative NEGATIVE    Leukocyte Clumps, Urine Small NEGATIVE    Color, Urine Yellow YELLOW-STR    Character, Urine Clear [email or web address]       Lab Results   Component Value Date    CREATININE 0.7 09/24/2017    BUN 6 (L) 09/24/2017     (L) 09/24/2017    K 3.6 09/24/2017    CL 95 (L) 09/24/2017    CO2 24 09/24/2017 Radiology  The patient has had a recent CT scan that I have reviewed along with its accompanying report. The study demonstrates what appears to be a foreign body and a fluid collection around this body. CT drainage has been performed since then. Impression   1. Slight worsening of hydronephrosis. No identifiable etiology. 2. Persistent radiopaque density right lower quadrant with surrounding fluid suggesting a retained foreign body. Mild enhancement of the surrounding fluid this may represent a chronic seroma or hematoma. A developing abscess is not excludable. 3. Multiple additional chronic findings. Cystoscopy  After obtaining informed consent and prepping the urethral meatus, a 16-Upper sorbian flexible cystoscope was passed per urethra into the bladder. The urethra was evaluated on the way in and then again on the way out and was found to be normal.  The prostate was minimally obstructing. The bladder was evaluated in its endoscopic entirety and found to be normal.  There were no tumors, stones, ulcers or foreign bodies. There were no mucosal abnormalities. The ureteral orifices were seen and were normal.  The scope was removed. The patient tolerated the procedure and there were no complications. A dose of 500 mg ciprofloxacin was given for the procedure. Impression: normal cystoscopy      Plan:  Jean-Pierre Lyons presents today in follow-up. He recently underwent aspiration of a fluid collection which appears to be around a foreign body in his right pelvis. This formed body is likely his previous reservoir for his initial artificial urinary sphincter. Removing this would be very difficult and also wouldn't be very risky as he has an augmented bladder and has had multiple abdominal surgeries in the past.  He has a colostomy as well. After drainage of light to get a follow-up CT scan and see if things have improved.   The culture of the fluid removed with a CT aspiration demonstrated no

## 2017-10-08 ENCOUNTER — NURSE TRIAGE (OUTPATIENT)
Dept: ADMINISTRATIVE | Age: 47
End: 2017-10-08

## 2017-10-09 ENCOUNTER — TELEPHONE (OUTPATIENT)
Dept: UROLOGY | Age: 47
End: 2017-10-09

## 2017-10-09 DIAGNOSIS — K65.1 PELVIC ABSCESS IN MALE (HCC): Primary | ICD-10-CM

## 2017-10-09 NOTE — TELEPHONE ENCOUNTER
----- Message from Tom Burton MD sent at 10/8/2017  1:14 AM EDT -----  CT scan looks to be improved. We should check another scan in another 3 weeks.     Thanks,    C  ----- Message -----  From: [facility] Incoming Radiant Results From [employer]  Sent: 10/6/2017   3:40 PM  To: Tom Burton MD

## 2017-10-10 ENCOUNTER — TELEPHONE (OUTPATIENT)
Dept: UROLOGY | Age: 47
End: 2017-10-10

## 2017-10-10 NOTE — TELEPHONE ENCOUNTER
Low Payne is scheduled for his CT Scan on October 17, 2017 at 8:00am, arriving at 7:30am.  Nothing to eat or drink 4 hours prior. He was notified of this and voiced understanding.

## 2017-10-10 NOTE — TELEPHONE ENCOUNTER
Advised patient of Viky Case note below. Britney Martinez could you please schedule the CT scan of the pelvis and contact the patient. Please and Thank you.

## 2017-11-17 ENCOUNTER — CARE COORDINATION (OUTPATIENT)
Dept: CARE COORDINATION | Age: 47
End: 2017-11-17

## 2017-11-29 ENCOUNTER — CARE COORDINATION (OUTPATIENT)
Dept: CARE COORDINATION | Age: 47
End: 2017-11-29

## 2017-12-18 ENCOUNTER — TELEPHONE (OUTPATIENT)
Dept: UROLOGY | Age: 47
End: 2017-12-18

## 2017-12-19 NOTE — TELEPHONE ENCOUNTER
Patient is calling back to find out status of rx. He states he has pain and swelling. He was having burning sensation. Kidneys were hurting. He does not have fever now. He actually does feel better today, but still is requesting antibiotic.   He states dr Dwight Ramos had advised him before if he had issues to c/b for rx to keep him out of the hospital.

## 2017-12-20 RX ORDER — SULFAMETHOXAZOLE AND TRIMETHOPRIM 800; 160 MG/1; MG/1
1 TABLET ORAL 2 TIMES DAILY
Qty: 20 TABLET | Refills: 0 | Status: SHIPPED | OUTPATIENT
Start: 2017-12-20 | End: 2017-12-30

## 2017-12-20 NOTE — TELEPHONE ENCOUNTER
Called patient, He states he is doing better, still puffy, little swollen, but better. Will cancel his appoinment for today.

## 2018-03-08 RX ORDER — OXYBUTYNIN CHLORIDE 5 MG/1
TABLET ORAL
Qty: 270 TABLET | Refills: 1 | Status: SHIPPED | OUTPATIENT
Start: 2018-03-08 | End: 2018-04-13 | Stop reason: SDUPTHER

## 2018-04-10 ENCOUNTER — HOSPITAL ENCOUNTER (EMERGENCY)
Age: 48
Discharge: HOME | End: 2018-04-11
Payer: MEDICARE

## 2018-04-10 ENCOUNTER — APPOINTMENT (OUTPATIENT)
Dept: CT IMAGING | Age: 48
End: 2018-04-10
Payer: MEDICARE

## 2018-04-10 VITALS
DIASTOLIC BLOOD PRESSURE: 97 MMHG | SYSTOLIC BLOOD PRESSURE: 160 MMHG | OXYGEN SATURATION: 100 % | RESPIRATION RATE: 18 BRPM | HEART RATE: 83 BPM | TEMPERATURE: 98.6 F

## 2018-04-10 DIAGNOSIS — K59.00 CONSTIPATION, UNSPECIFIED CONSTIPATION TYPE: ICD-10-CM
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
## 2018-04-10 DIAGNOSIS — S70.01XA CONTUSION OF RIGHT HIP, INITIAL ENCOUNTER: ICD-10-CM

## 2018-04-10 LAB
ALBUMIN SERPL-MCNC: 3.9 G/DL (ref 3.5–5.1)
ALP BLD-CCNC: 135 U/L (ref 38–126)
ALT SERPL-CCNC: 15 U/L (ref 11–66)
ANION GAP SERPL CALCULATED.3IONS-SCNC: 12 MEQ/L (ref 8–16)
ANISOCYTOSIS: ABNORMAL
AST SERPL-CCNC: 20 U/L (ref 5–40)
BASOPHILS # BLD: 1.5 %
BASOPHILS ABSOLUTE: 0.2 THOU/MM3 (ref 0–0.1)
BILIRUB SERPL-MCNC: 0.4 MG/DL (ref 0.3–1.2)
BILIRUBIN DIRECT: < 0.2 MG/DL (ref 0–0.3)
BUN BLDV-MCNC: 8 MG/DL (ref 7–22)
CALCIUM SERPL-MCNC: 8.9 MG/DL (ref 8.5–10.5)
CHLORIDE BLD-SCNC: 99 MEQ/L (ref 98–111)
CO2: 26 MEQ/L (ref 23–33)
CREAT SERPL-MCNC: 0.7 MG/DL (ref 0.4–1.2)
EOSINOPHIL # BLD: 0.3 %
EOSINOPHILS ABSOLUTE: 0 THOU/MM3 (ref 0–0.4)
GFR SERPL CREATININE-BSD FRML MDRD: > 90 ML/MIN/1.73M2
GLUCOSE BLD-MCNC: 108 MG/DL (ref 70–108)
HCT VFR BLD CALC: 37.2 % (ref 42–52)
HEMOGLOBIN: 12.2 GM/DL (ref 14–18)
HYPOCHROMIA: ABNORMAL
LYMPHOCYTES # BLD: 9.3 %
LYMPHOCYTES ABSOLUTE: 1 THOU/MM3 (ref 1–4.8)
MCH RBC QN AUTO: 24.7 PG (ref 27–31)
MCHC RBC AUTO-ENTMCNC: 32.9 GM/DL (ref 33–37)
MCV RBC AUTO: 74.9 FL (ref 80–94)
MICROCYTES: ABNORMAL
MONOCYTES # BLD: 5.5 %
MONOCYTES ABSOLUTE: 0.6 THOU/MM3 (ref 0.4–1.3)
NUCLEATED RED BLOOD CELLS: 0 /100 WBC
OSMOLALITY CALCULATION: 272.7 MOSMOL/KG (ref 275–300)
PDW BLD-RTO: 16 % (ref 11.5–14.5)
PLATELET # BLD: 356 THOU/MM3 (ref 130–400)
PMV BLD AUTO: 7.8 FL (ref 7.4–10.4)
POTASSIUM SERPL-SCNC: 3.8 MEQ/L (ref 3.5–5.2)
RBC # BLD: 4.96 MILL/MM3 (ref 4.7–6.1)
SEG NEUTROPHILS: 83.4 %
SEGMENTED NEUTROPHILS ABSOLUTE COUNT: 8.8 THOU/MM3 (ref 1.8–7.7)
SODIUM BLD-SCNC: 137 MEQ/L (ref 135–145)
TOTAL PROTEIN: 7.6 G/DL (ref 6.1–8)
WBC # BLD: 10.5 THOU/MM3 (ref 4.8–10.8)

## 2018-04-10 PROCEDURE — 96374 THER/PROPH/DIAG INJ IV PUSH: CPT

## 2018-04-10 PROCEDURE — 85025 COMPLETE CBC W/AUTO DIFF WBC: CPT

## 2018-04-10 PROCEDURE — 74177 CT ABD & PELVIS W/CONTRAST: CPT

## 2018-04-10 PROCEDURE — 82248 BILIRUBIN DIRECT: CPT

## 2018-04-10 PROCEDURE — 6360000002 HC RX W HCPCS

## 2018-04-10 PROCEDURE — 99284 EMERGENCY DEPT VISIT MOD MDM: CPT

## 2018-04-10 PROCEDURE — 6360000002 HC RX W HCPCS: Performed by: NURSE PRACTITIONER

## 2018-04-10 PROCEDURE — 6360000004 HC RX CONTRAST MEDICATION: Performed by: NURSE PRACTITIONER

## 2018-04-10 PROCEDURE — 80053 COMPREHEN METABOLIC PANEL: CPT

## 2018-04-10 PROCEDURE — 36415 COLL VENOUS BLD VENIPUNCTURE: CPT

## 2018-04-10 RX ORDER — SENNOSIDES 8.6 MG
1 TABLET ORAL DAILY
Qty: 120 TABLET | Refills: 0 | Status: ON HOLD | OUTPATIENT
Start: 2018-04-10 | End: 2019-05-10 | Stop reason: ALTCHOICE

## 2018-04-10 RX ORDER — ONDANSETRON 4 MG/1
4 TABLET, ORALLY DISINTEGRATING ORAL ONCE
Status: COMPLETED | OUTPATIENT
Start: 2018-04-10 | End: 2018-04-10

## 2018-04-10 RX ORDER — ONDANSETRON 4 MG/1
TABLET, ORALLY DISINTEGRATING ORAL
Status: COMPLETED
Start: 2018-04-10 | End: 2018-04-10

## 2018-04-10 RX ORDER — ONDANSETRON 4 MG/1
4 TABLET, ORALLY DISINTEGRATING ORAL ONCE
Status: COMPLETED | OUTPATIENT
Start: 2018-04-11 | End: 2018-04-10

## 2018-04-10 RX ORDER — POLYETHYLENE GLYCOL 3350 17 G/17G
17 POWDER, FOR SOLUTION ORAL DAILY
Qty: 1 BOTTLE | Refills: 0 | Status: SHIPPED | OUTPATIENT
Start: 2018-04-10 | End: 2018-04-17

## 2018-04-10 RX ORDER — MORPHINE SULFATE 2 MG/ML
2 INJECTION, SOLUTION INTRAMUSCULAR; INTRAVENOUS ONCE
Status: COMPLETED | OUTPATIENT
Start: 2018-04-10 | End: 2018-04-10

## 2018-04-10 RX ADMIN — IOPAMIDOL 80 ML: 755 INJECTION, SOLUTION INTRAVENOUS at 22:29

## 2018-04-10 RX ADMIN — ONDANSETRON 4 MG: 4 TABLET, ORALLY DISINTEGRATING ORAL at 22:06

## 2018-04-10 RX ADMIN — MORPHINE SULFATE 2 MG: 2 INJECTION, SOLUTION INTRAMUSCULAR; INTRAVENOUS at 22:06

## 2018-04-10 RX ADMIN — ONDANSETRON 4 MG: 4 TABLET, ORALLY DISINTEGRATING ORAL at 23:48

## 2018-04-10 ASSESSMENT — ENCOUNTER SYMPTOMS
NAUSEA: 0
ABDOMINAL DISTENTION: 0
CHEST TIGHTNESS: 0
BACK PAIN: 0
RHINORRHEA: 0
COUGH: 0
DIARRHEA: 0
VOICE CHANGE: 0
EYE REDNESS: 0
SHORTNESS OF BREATH: 0
WHEEZING: 0
COLOR CHANGE: 0
SINUS PRESSURE: 0
BLOOD IN STOOL: 0
CONSTIPATION: 0
SORE THROAT: 0
PHOTOPHOBIA: 0
ABDOMINAL PAIN: 0
VOMITING: 0

## 2018-04-10 ASSESSMENT — PAIN SCALES - GENERAL
PAINLEVEL_OUTOF10: 10
PAINLEVEL_OUTOF10: 10
PAINLEVEL_OUTOF10: 9
PAINLEVEL_OUTOF10: 7

## 2018-04-10 ASSESSMENT — PAIN DESCRIPTION - LOCATION: LOCATION: HIP

## 2018-04-12 ENCOUNTER — NURSE TRIAGE (OUTPATIENT)
Dept: ADMINISTRATIVE | Age: 48
End: 2018-04-12

## 2018-04-12 ENCOUNTER — CARE COORDINATION (OUTPATIENT)
Dept: CASE MANAGEMENT | Age: 48
End: 2018-04-12

## 2018-04-13 ENCOUNTER — OFFICE VISIT (OUTPATIENT)
Dept: FAMILY MEDICINE CLINIC | Age: 48
End: 2018-04-13
Payer: MEDICARE

## 2018-04-13 ENCOUNTER — TELEPHONE (OUTPATIENT)
Dept: FAMILY MEDICINE CLINIC | Age: 48
End: 2018-04-13

## 2018-04-13 VITALS
DIASTOLIC BLOOD PRESSURE: 86 MMHG | HEART RATE: 96 BPM | RESPIRATION RATE: 16 BRPM | TEMPERATURE: 98.9 F | SYSTOLIC BLOOD PRESSURE: 136 MMHG

## 2018-04-13 DIAGNOSIS — R10.9 FLANK PAIN: ICD-10-CM

## 2018-04-13 DIAGNOSIS — G82.20 LOWER PARAPLEGIA (HCC): ICD-10-CM

## 2018-04-13 DIAGNOSIS — N30.01 ACUTE CYSTITIS WITH HEMATURIA: Primary | ICD-10-CM

## 2018-04-13 DIAGNOSIS — R50.9 FEVER, UNSPECIFIED FEVER CAUSE: ICD-10-CM

## 2018-04-13 LAB
BILIRUBIN, POC: ABNORMAL
BLOOD URINE, POC: ABNORMAL
CLARITY, POC: ABNORMAL
COLOR, POC: ABNORMAL
GLUCOSE URINE, POC: ABNORMAL
KETONES, POC: ABNORMAL
LEUKOCYTE EST, POC: POSITIVE
NITRITE, POC: ABNORMAL
PH, POC: 6.5
PROTEIN, POC: ABNORMAL
SPECIFIC GRAVITY, POC: 1.01
UROBILINOGEN, POC: 4

## 2018-04-13 PROCEDURE — 99213 OFFICE O/P EST LOW 20 MIN: CPT | Performed by: FAMILY MEDICINE

## 2018-04-13 PROCEDURE — G8421 BMI NOT CALCULATED: HCPCS | Performed by: FAMILY MEDICINE

## 2018-04-13 PROCEDURE — 1036F TOBACCO NON-USER: CPT | Performed by: FAMILY MEDICINE

## 2018-04-13 PROCEDURE — 81003 URINALYSIS AUTO W/O SCOPE: CPT | Performed by: FAMILY MEDICINE

## 2018-04-13 PROCEDURE — G8427 DOCREV CUR MEDS BY ELIG CLIN: HCPCS | Performed by: FAMILY MEDICINE

## 2018-04-13 RX ORDER — CUSHION
EACH MISCELLANEOUS
Qty: 1 EACH | Refills: 0 | Status: SHIPPED | OUTPATIENT
Start: 2018-04-13 | End: 2018-05-21 | Stop reason: SDUPTHER

## 2018-04-13 RX ORDER — ALENDRONATE SODIUM 70 MG/1
1 TABLET ORAL WEEKLY
Refills: 5 | COMMUNITY
Start: 2018-03-02 | End: 2018-10-29

## 2018-04-13 RX ORDER — SULFAMETHOXAZOLE AND TRIMETHOPRIM 800; 160 MG/1; MG/1
1 TABLET ORAL 2 TIMES DAILY
Qty: 20 TABLET | Refills: 0 | Status: SHIPPED | OUTPATIENT
Start: 2018-04-13 | End: 2018-04-23

## 2018-04-13 ASSESSMENT — PATIENT HEALTH QUESTIONNAIRE - PHQ9
SUM OF ALL RESPONSES TO PHQ9 QUESTIONS 1 & 2: 0
1. LITTLE INTEREST OR PLEASURE IN DOING THINGS: 0
SUM OF ALL RESPONSES TO PHQ QUESTIONS 1-9: 0
2. FEELING DOWN, DEPRESSED OR HOPELESS: 0

## 2018-04-15 LAB
ORGANISM: ABNORMAL
URINE CULTURE, ROUTINE: ABNORMAL

## 2018-04-15 ASSESSMENT — ENCOUNTER SYMPTOMS
ABDOMINAL PAIN: 0
DIARRHEA: 0
COUGH: 0
EYE PAIN: 0
SORE THROAT: 0
SINUS PRESSURE: 0
RHINORRHEA: 0
CONSTIPATION: 0
SHORTNESS OF BREATH: 0
NAUSEA: 0
ABDOMINAL DISTENTION: 0

## 2018-04-18 ENCOUNTER — TELEPHONE (OUTPATIENT)
Dept: FAMILY MEDICINE CLINIC | Age: 48
End: 2018-04-18

## 2018-04-20 RX ORDER — LEVOFLOXACIN 500 MG/1
500 TABLET, FILM COATED ORAL DAILY
Qty: 10 TABLET | Refills: 0 | Status: SHIPPED | OUTPATIENT
Start: 2018-04-20 | End: 2018-04-30

## 2018-05-21 DIAGNOSIS — G82.20 LOWER PARAPLEGIA (HCC): Primary | Chronic | ICD-10-CM

## 2018-05-21 RX ORDER — CUSHION
EACH MISCELLANEOUS
Qty: 1 EACH | Refills: 0 | Status: SHIPPED | OUTPATIENT
Start: 2018-05-21 | End: 2021-06-01 | Stop reason: SDUPTHER

## 2018-05-30 ENCOUNTER — TELEPHONE (OUTPATIENT)
Dept: FAMILY MEDICINE CLINIC | Age: 48
End: 2018-05-30

## 2018-05-30 DIAGNOSIS — G82.20 LOWER PARAPLEGIA (HCC): Primary | ICD-10-CM

## 2018-08-29 RX ORDER — OXYBUTYNIN CHLORIDE 5 MG/1
5 TABLET ORAL 3 TIMES DAILY
Qty: 270 TABLET | Refills: 3 | Status: SHIPPED | OUTPATIENT
Start: 2018-08-29

## 2018-10-08 ENCOUNTER — PROCEDURE VISIT (OUTPATIENT)
Dept: UROLOGY | Age: 48
End: 2018-10-08
Payer: MEDICARE

## 2018-10-08 VITALS
HEIGHT: 66 IN | DIASTOLIC BLOOD PRESSURE: 68 MMHG | WEIGHT: 149 LBS | SYSTOLIC BLOOD PRESSURE: 120 MMHG | BODY MASS INDEX: 23.95 KG/M2

## 2018-10-08 DIAGNOSIS — Z01.818 PRE-OP TESTING: ICD-10-CM

## 2018-10-08 DIAGNOSIS — R53.83 FATIGUE, UNSPECIFIED TYPE: ICD-10-CM

## 2018-10-08 DIAGNOSIS — N20.0 NEPHROLITHIASIS: ICD-10-CM

## 2018-10-08 DIAGNOSIS — Z87.448 PERSONAL HISTORY OF URETHRAL STRICTURE: ICD-10-CM

## 2018-10-08 DIAGNOSIS — I10 HTN (HYPERTENSION), BENIGN: ICD-10-CM

## 2018-10-08 DIAGNOSIS — N32.89 BLADDER SPASMS: Primary | ICD-10-CM

## 2018-10-08 LAB
BILIRUBIN URINE: NEGATIVE
BLOOD URINE, POC: ABNORMAL
CHARACTER, URINE: CLEAR
COLOR, URINE: YELLOW
GLUCOSE URINE: NEGATIVE MG/DL
KETONES, URINE: NEGATIVE
LEUKOCYTE CLUMPS, URINE: ABNORMAL
NITRITE, URINE: NEGATIVE
PH, URINE: 7
PROTEIN, URINE: 30 MG/DL
SPECIFIC GRAVITY, URINE: >= 1.03 (ref 1–1.03)
UROBILINOGEN, URINE: 0.2 EU/DL

## 2018-10-08 PROCEDURE — 99999 PR OFFICE/OUTPT VISIT,PROCEDURE ONLY: CPT | Performed by: UROLOGY

## 2018-10-08 PROCEDURE — 52000 CYSTOURETHROSCOPY: CPT | Performed by: UROLOGY

## 2018-10-08 PROCEDURE — 81003 URINALYSIS AUTO W/O SCOPE: CPT | Performed by: UROLOGY

## 2018-10-08 NOTE — PROGRESS NOTES
Mr. Michel Sebastian was seen in follow up for cystoscopy for nonfunctioning artificial urinary sphincter. Past Medical History:   Diagnosis Date    Arthritis     Chondromatosis     lower spine tumor    Depression     GERD (gastroesophageal reflux disease)     Hypertension     Insomnia     Lower paraplegia (HCC)     ankles down    Meningitis spinal     at 8years old    PONV (postoperative nausea and vomiting)        Past Surgical History:   Procedure Laterality Date    ABDOMEN SURGERY      BACK SURGERY      BLADDER SURGERY      sphincter augmentation   ARTIFICIAL SPHINCTER MRI SAFE 1.5T    COLONOSCOPY      COLOSTOMY      DILATATION, ESOPHAGUS      ENDOSCOPY, COLON, DIAGNOSTIC      FRACTURE SURGERY      NEPHROLITHOTOMY Left 8/15/2017    LEFT PERCUTANEOUS NEPHROLITHOTRIPSY performed by Sharron Arguelles MD at [address] [place] [address]  2002    skin flap bottom    SPINE SURGERY      Multiple    URETER SURGERY      replaced on left as kid    URETHRA SURGERY  02/17/2014    REVISION OF URETHRAL SPHINCTER       Current Outpatient Prescriptions on File Prior to Visit   Medication Sig Dispense Refill    oxybutynin (DITROPAN) 5 MG tablet Take 1 tablet by mouth 3 times daily 270 tablet 3    Misc. Devices (Mando Swan) MISC 4\" RoHo cushion Diagnosis: G82.20 Lower Paraplegia 1 each 0    alendronate (FOSAMAX) 70 MG tablet Take 1 tablet by mouth once a week  5    senna (SENOKOT) 8.6 MG TABS tablet Take 1 tablet by mouth daily 120 tablet 0    traMADol (ULTRAM) 50 MG tablet Take by mouth every 6 hours as needed for Pain 1-2 tab      oxyCODONE-acetaminophen (PERCOCET)  MG per tablet Take 1 tablet by mouth every 8 hours as needed for Pain .  gabapentin (NEURONTIN) 800 MG tablet Take 800 mg by mouth 3 times daily      fentaNYL (DURAGESIC) 25 MCG/HR Place 1 patch onto the skin every 72 hours . No current facility-administered medications on file prior to visit. Allergies   Allergen Reactions    Asa Buff (Mag [Buffered Aspirin]      Tongue swelling    Prednisone      Insomnia, confusion, restless,       Family History   Problem Relation Age of Onset    Diabetes Mother     High Blood Pressure Mother     Kidney Disease Mother     Cancer Neg Hx     Stroke Neg Hx     Heart Disease Neg Hx        Social History     Social History    Marital status:      Spouse name: N/A    Number of children: N/A    Years of education: N/A     Occupational History    Not on file. Social History Main Topics    Smoking status: Former Smoker     Packs/day: 1.00     Quit date: 8/1/2013    Smokeless tobacco: Never Used      Comment: using vapor no nicotine    Alcohol use Yes      Comment: rarely    Drug use: No    Sexual activity: Not Currently     Other Topics Concern    Not on file     Social History Narrative    No narrative on file       Review of Systems  No problems with ears, nose or throat. No problems with eyes. No chest pain, shortness of breath, abdominal pain, extremity pain or weakness, and no neurological deficits. No rashes. No swollen glands or lymph nodes.  symptoms per HPI. The remainder of the review of symptoms is negative. Exam  Constitutional: oriented to person, place, and time. Vital signs are normal. appears well-developed and well-nourished. cooperative. No distress. HENT:    Head: Normocephalic and atraumatic.    Mouth/Throat: Oropharynx is clear and moist and mucous membranes are normal. No oropharyngeal exudate. Eyes: EOM are normal. Pupils are equal, round, and reactive to light. Right eye exhibits no discharge. Left eye exhibits no discharge. No scleral icterus. Neck: Trachea normal. No JVD present. No tracheal deviation present. Cardiovascular: Normal rate and regular rhythm. Pulmonary/Chest: Effort normal. No respiratory distress. no wheezes. Abdominal: Soft. exhibits no distension. There is no tenderness.

## 2018-10-26 ENCOUNTER — HOSPITAL ENCOUNTER (OUTPATIENT)
Age: 48
Discharge: HOME | End: 2018-10-26
Payer: MEDICARE

## 2018-10-26 ENCOUNTER — HOSPITAL ENCOUNTER (OUTPATIENT)
Dept: GENERAL RADIOLOGY | Age: 48
Discharge: HOME | End: 2018-10-26
Payer: MEDICARE

## 2018-10-26 DIAGNOSIS — Z87.448 PERSONAL HISTORY OF URETHRAL STRICTURE: ICD-10-CM

## 2018-10-26 DIAGNOSIS — R53.83 FATIGUE, UNSPECIFIED TYPE: ICD-10-CM

## 2018-10-26 DIAGNOSIS — Z01.818 PRE-OP TESTING: ICD-10-CM

## 2018-10-26 DIAGNOSIS — I10 HTN (HYPERTENSION), BENIGN: ICD-10-CM

## 2018-10-26 LAB
ANION GAP SERPL CALCULATED.3IONS-SCNC: 13 MEQ/L (ref 8–16)
BASOPHILS # BLD: 0.8 %
BASOPHILS ABSOLUTE: 0.1 THOU/MM3 (ref 0–0.1)
BUN BLDV-MCNC: 9 MG/DL (ref 7–22)
CALCIUM SERPL-MCNC: 9.5 MG/DL (ref 8.5–10.5)
CHLORIDE BLD-SCNC: 101 MEQ/L (ref 98–111)
CO2: 27 MEQ/L (ref 23–33)
CREAT SERPL-MCNC: 0.8 MG/DL (ref 0.4–1.2)
EKG ATRIAL RATE: 89 BPM
EKG P AXIS: 43 DEGREES
EKG P-R INTERVAL: 152 MS
EKG Q-T INTERVAL: 366 MS
EKG QRS DURATION: 88 MS
EKG QTC CALCULATION (BAZETT): 445 MS
EKG R AXIS: 56 DEGREES
EKG T AXIS: 22 DEGREES
EKG VENTRICULAR RATE: 89 BPM
EOSINOPHIL # BLD: 0.9 %
EOSINOPHILS ABSOLUTE: 0.1 THOU/MM3 (ref 0–0.4)
ERYTHROCYTE [DISTWIDTH] IN BLOOD BY AUTOMATED COUNT: 15.5 % (ref 11.5–14.5)
ERYTHROCYTE [DISTWIDTH] IN BLOOD BY AUTOMATED COUNT: 44.5 FL (ref 35–45)
GFR SERPL CREATININE-BSD FRML MDRD: > 90 ML/MIN/1.73M2
GLUCOSE BLD-MCNC: 96 MG/DL (ref 70–108)
HCT VFR BLD CALC: 41.6 % (ref 42–52)
HEMOGLOBIN: 13.2 GM/DL (ref 14–18)
IMMATURE GRANS (ABS): 0.04 THOU/MM3 (ref 0–0.07)
IMMATURE GRANULOCYTES: 0.5 %
LYMPHOCYTES # BLD: 23.6 %
LYMPHOCYTES ABSOLUTE: 1.9 THOU/MM3 (ref 1–4.8)
MCH RBC QN AUTO: 25.2 PG (ref 26–33)
MCHC RBC AUTO-ENTMCNC: 31.7 GM/DL (ref 32.2–35.5)
MCV RBC AUTO: 79.4 FL (ref 80–94)
MONOCYTES # BLD: 9.8 %
MONOCYTES ABSOLUTE: 0.8 THOU/MM3 (ref 0.4–1.3)
NUCLEATED RED BLOOD CELLS: 0 /100 WBC
PLATELET # BLD: 363 THOU/MM3 (ref 130–400)
PMV BLD AUTO: 9.7 FL (ref 9.4–12.4)
POTASSIUM SERPL-SCNC: 3.7 MEQ/L (ref 3.5–5.2)
RBC # BLD: 5.24 MILL/MM3 (ref 4.7–6.1)
SEG NEUTROPHILS: 64.4 %
SEGMENTED NEUTROPHILS ABSOLUTE COUNT: 5.1 THOU/MM3 (ref 1.8–7.7)
SODIUM BLD-SCNC: 141 MEQ/L (ref 135–145)
WBC # BLD: 7.9 THOU/MM3 (ref 4.8–10.8)

## 2018-10-26 PROCEDURE — 85025 COMPLETE CBC W/AUTO DIFF WBC: CPT

## 2018-10-26 PROCEDURE — 71046 X-RAY EXAM CHEST 2 VIEWS: CPT

## 2018-10-26 PROCEDURE — 93010 ELECTROCARDIOGRAM REPORT: CPT | Performed by: INTERNAL MEDICINE

## 2018-10-26 PROCEDURE — 36415 COLL VENOUS BLD VENIPUNCTURE: CPT

## 2018-10-26 PROCEDURE — 80048 BASIC METABOLIC PNL TOTAL CA: CPT

## 2018-10-26 PROCEDURE — 93005 ELECTROCARDIOGRAM TRACING: CPT

## 2018-10-29 ENCOUNTER — OFFICE VISIT (OUTPATIENT)
Dept: FAMILY MEDICINE CLINIC | Age: 48
End: 2018-10-29
Payer: MEDICARE

## 2018-10-29 VITALS
TEMPERATURE: 99.1 F | SYSTOLIC BLOOD PRESSURE: 142 MMHG | DIASTOLIC BLOOD PRESSURE: 80 MMHG | HEART RATE: 100 BPM | RESPIRATION RATE: 20 BRPM

## 2018-10-29 DIAGNOSIS — R93.5 ABNORMAL ABDOMINAL CT SCAN: ICD-10-CM

## 2018-10-29 DIAGNOSIS — G82.20 LOWER PARAPLEGIA (HCC): Primary | Chronic | ICD-10-CM

## 2018-10-29 DIAGNOSIS — N32.89 BLADDER SPASMS: ICD-10-CM

## 2018-10-29 PROCEDURE — G0008 ADMIN INFLUENZA VIRUS VAC: HCPCS | Performed by: FAMILY MEDICINE

## 2018-10-29 PROCEDURE — 1036F TOBACCO NON-USER: CPT | Performed by: FAMILY MEDICINE

## 2018-10-29 PROCEDURE — G8420 CALC BMI NORM PARAMETERS: HCPCS | Performed by: FAMILY MEDICINE

## 2018-10-29 PROCEDURE — 99214 OFFICE O/P EST MOD 30 MIN: CPT | Performed by: FAMILY MEDICINE

## 2018-10-29 PROCEDURE — G8482 FLU IMMUNIZE ORDER/ADMIN: HCPCS | Performed by: FAMILY MEDICINE

## 2018-10-29 PROCEDURE — 90688 IIV4 VACCINE SPLT 0.5 ML IM: CPT | Performed by: FAMILY MEDICINE

## 2018-10-29 PROCEDURE — G8427 DOCREV CUR MEDS BY ELIG CLIN: HCPCS | Performed by: FAMILY MEDICINE

## 2018-10-29 RX ORDER — AMITRIPTYLINE HYDROCHLORIDE 75 MG/1
75 TABLET, FILM COATED ORAL NIGHTLY
Status: ON HOLD | COMMUNITY
End: 2019-06-13 | Stop reason: HOSPADM

## 2018-10-30 ASSESSMENT — ENCOUNTER SYMPTOMS
EYE PAIN: 0
DIARRHEA: 0
RHINORRHEA: 0
COUGH: 0
SHORTNESS OF BREATH: 0
SINUS PRESSURE: 0
ABDOMINAL PAIN: 0
NAUSEA: 0
CONSTIPATION: 0
SORE THROAT: 0
ABDOMINAL DISTENTION: 0

## 2018-10-30 NOTE — PROGRESS NOTES
medications for this visit. Allergies   Allergen Reactions    Asa Buff (Mag [Buffered Aspirin]      Tongue swelling    Prednisone      Insomnia, confusion, restless,     Health Maintenance   Topic Date Due    HIV screen  01/23/1985    DTaP/Tdap/Td vaccine (1 - Tdap) 01/23/1989    Potassium monitoring  10/26/2019    Creatinine monitoring  10/26/2019    Lipid screen  05/31/2022    Flu vaccine  Completed         Objective:     Physical Exam   Constitutional: He is oriented to person, place, and time. No distress. HENT:   Mouth/Throat: Oropharynx is clear and moist. No oropharyngeal exudate. Eyes: Conjunctivae are normal.   Neck: No thyromegaly present. No carotid bruits   Cardiovascular: Normal rate, regular rhythm, normal heart sounds and intact distal pulses. No murmur heard. Pulmonary/Chest: Breath sounds normal. He has no wheezes. He has no rales. Abdominal: Soft. Bowel sounds are normal. He exhibits no distension and no mass. There is no tenderness. There is no rebound and no guarding. Musculoskeletal: Normal range of motion. He exhibits no edema or tenderness. Lymphadenopathy:     He has no cervical adenopathy. Neurological: He is alert and oriented to person, place, and time. He displays abnormal reflex. A sensory deficit is present. No cranial nerve deficit. He exhibits abnormal muscle tone. Skin: Skin is dry. No rash noted. Psychiatric: He has a normal mood and affect. Vitals reviewed. BP (!) 142/80 Comment: left  Pulse 100   Temp 99.1 °F (37.3 °C) (Oral)   Resp 20     EKG sinus rhythm, unchanged from previous. Preop laboratory data is unremarkable. Chest x-ray is without active disease  Impression/Plan:  1. Lower paraplegia (HCC)    2. Bladder spasms    3.  Abnormal abdominal CT scan      Requested Prescriptions      No prescriptions requested or ordered in this encounter     Orders Placed This Encounter   Procedures    INFLUENZA, QUADV, 3 YRS AND OLDER, IM, MDV,

## 2018-10-31 ENCOUNTER — TELEPHONE (OUTPATIENT)
Dept: UROLOGY | Age: 48
End: 2018-10-31

## 2018-10-31 NOTE — TELEPHONE ENCOUNTER
Confirmed surgery date with AMS rep [facility] (#[phone]) for 11/29/18 3:00pm.    Confirmed surgery date with patient. Arrival time 1:30pm.  NPO midnight. Instructions &orders mailed to pt. He voiced understanding.

## 2018-11-06 ENCOUNTER — HOSPITAL ENCOUNTER (OUTPATIENT)
Dept: MRI IMAGING | Age: 48
Discharge: HOME | End: 2018-11-06
Payer: MEDICARE

## 2018-11-06 DIAGNOSIS — M47.12 CERVICAL SPONDYLOSIS WITH MYELOPATHY: ICD-10-CM

## 2018-11-06 PROCEDURE — 72141 MRI NECK SPINE W/O DYE: CPT

## 2018-11-12 ENCOUNTER — TELEPHONE (OUTPATIENT)
Dept: UROLOGY | Age: 48
End: 2018-11-12

## 2018-11-25 NOTE — H&P
ureteral orifices were seen and were normal.  The scope was removed. The patient tolerated the procedure and there were no complications. A dose of 500 mg ciprofloxacin was given for the procedure.     Impression: normal cystoscopy     During the cystoscopy- patient pumped the artifical sphincter and unfortunately this was not working.         Plan:     Schedule revision of artifical sphincter using a double cuff- will need clearance.     I described the procedure in detail and also described the associated risks and benefits at length. We discussed possible alternative therapies. We discussed the risks and benefits of not undergoing therapy. Adair Bell understands these risks and benefits and desires to proceed. He will be scheduled for the procedure in the very near future.

## 2018-11-29 ENCOUNTER — HOSPITAL ENCOUNTER (OUTPATIENT)
Age: 48
Setting detail: OUTPATIENT SURGERY
Discharge: HOME | End: 2018-11-29
Attending: UROLOGY | Admitting: UROLOGY
Payer: MEDICARE

## 2018-11-29 ENCOUNTER — ANESTHESIA EVENT (OUTPATIENT)
Dept: OPERATING ROOM | Age: 48
End: 2018-11-29
Payer: MEDICARE

## 2018-11-29 ENCOUNTER — ANESTHESIA (OUTPATIENT)
Dept: OPERATING ROOM | Age: 48
End: 2018-11-29
Payer: MEDICARE

## 2018-11-29 VITALS
BODY MASS INDEX: 24.16 KG/M2 | WEIGHT: 145 LBS | HEART RATE: 102 BPM | RESPIRATION RATE: 16 BRPM | OXYGEN SATURATION: 97 % | SYSTOLIC BLOOD PRESSURE: 172 MMHG | TEMPERATURE: 97.6 F | DIASTOLIC BLOOD PRESSURE: 99 MMHG | HEIGHT: 65 IN

## 2018-11-29 LAB
BACTERIA: ABNORMAL /HPF
BILIRUBIN URINE: NEGATIVE
BLOOD, URINE: ABNORMAL
CASTS 2: ABNORMAL /LPF
CASTS UA: ABNORMAL /LPF
CHARACTER, URINE: ABNORMAL
COLOR: YELLOW
CRYSTALS, UA: ABNORMAL
EPITHELIAL CELLS, UA: ABNORMAL /HPF
GLUCOSE URINE: NEGATIVE MG/DL
KETONES, URINE: NEGATIVE
LEUKOCYTE ESTERASE, URINE: ABNORMAL
MISCELLANEOUS 2: ABNORMAL
NITRITE, URINE: POSITIVE
PH UA: 7
PROTEIN UA: NEGATIVE
RBC URINE: ABNORMAL /HPF
RENAL EPITHELIAL, UA: ABNORMAL
SPECIFIC GRAVITY, URINE: 1.01 (ref 1–1.03)
UROBILINOGEN, URINE: 1 EU/DL
WBC UA: ABNORMAL /HPF
YEAST: ABNORMAL

## 2018-11-29 PROCEDURE — 87186 SC STD MICRODIL/AGAR DIL: CPT

## 2018-11-29 PROCEDURE — 87077 CULTURE AEROBIC IDENTIFY: CPT

## 2018-11-29 PROCEDURE — 6360000002 HC RX W HCPCS

## 2018-11-29 PROCEDURE — 2580000003 HC RX 258

## 2018-11-29 PROCEDURE — 81001 URINALYSIS AUTO W/SCOPE: CPT

## 2018-11-29 PROCEDURE — 87086 URINE CULTURE/COLONY COUNT: CPT

## 2018-11-29 RX ORDER — CIPROFLOXACIN 500 MG/1
500 TABLET, FILM COATED ORAL 2 TIMES DAILY
Qty: 20 TABLET | Refills: 0 | Status: SHIPPED | OUTPATIENT
Start: 2018-11-29 | End: 2018-12-09

## 2018-11-29 RX ORDER — HYDROCODONE BITARTRATE AND ACETAMINOPHEN 5; 325 MG/1; MG/1
2 TABLET ORAL EVERY 4 HOURS PRN
Status: DISCONTINUED | OUTPATIENT
Start: 2018-11-29 | End: 2018-11-29 | Stop reason: HOSPADM

## 2018-11-29 RX ORDER — MORPHINE SULFATE 2 MG/ML
4 INJECTION, SOLUTION INTRAMUSCULAR; INTRAVENOUS
Status: DISCONTINUED | OUTPATIENT
Start: 2018-11-29 | End: 2018-11-29 | Stop reason: HOSPADM

## 2018-11-29 RX ORDER — ONDANSETRON 2 MG/ML
4 INJECTION INTRAMUSCULAR; INTRAVENOUS EVERY 4 HOURS PRN
Status: DISCONTINUED | OUTPATIENT
Start: 2018-11-29 | End: 2018-11-29 | Stop reason: HOSPADM

## 2018-11-29 RX ORDER — ATROPA BELLADONNA AND OPIUM 16.2; 3 MG/1; MG/1
30 SUPPOSITORY RECTAL EVERY 8 HOURS PRN
Status: DISCONTINUED | OUTPATIENT
Start: 2018-11-29 | End: 2018-11-29 | Stop reason: HOSPADM

## 2018-11-29 RX ORDER — ACETAMINOPHEN 325 MG/1
650 TABLET ORAL EVERY 4 HOURS PRN
Status: DISCONTINUED | OUTPATIENT
Start: 2018-11-29 | End: 2018-11-29 | Stop reason: HOSPADM

## 2018-11-29 RX ORDER — CIPROFLOXACIN 2 MG/ML
400 INJECTION, SOLUTION INTRAVENOUS
Status: DISCONTINUED | OUTPATIENT
Start: 2018-11-29 | End: 2018-11-29 | Stop reason: HOSPADM

## 2018-11-29 RX ORDER — HYDROCODONE BITARTRATE AND ACETAMINOPHEN 5; 325 MG/1; MG/1
1 TABLET ORAL EVERY 4 HOURS PRN
Status: DISCONTINUED | OUTPATIENT
Start: 2018-11-29 | End: 2018-11-29 | Stop reason: HOSPADM

## 2018-11-29 RX ORDER — SODIUM CHLORIDE 9 MG/ML
INJECTION, SOLUTION INTRAVENOUS CONTINUOUS
Status: DISCONTINUED | OUTPATIENT
Start: 2018-11-29 | End: 2018-11-29 | Stop reason: HOSPADM

## 2018-11-29 RX ORDER — CIPROFLOXACIN 500 MG/1
500 TABLET, FILM COATED ORAL 2 TIMES DAILY
Qty: 14 TABLET | Refills: 0 | Status: SHIPPED | OUTPATIENT
Start: 2018-11-29 | End: 2018-11-29

## 2018-11-29 RX ORDER — MORPHINE SULFATE 2 MG/ML
2 INJECTION, SOLUTION INTRAMUSCULAR; INTRAVENOUS
Status: DISCONTINUED | OUTPATIENT
Start: 2018-11-29 | End: 2018-11-29 | Stop reason: HOSPADM

## 2018-11-29 RX ADMIN — HYDROMORPHONE HYDROCHLORIDE 0.5 MG: 1 INJECTION, SOLUTION INTRAMUSCULAR; INTRAVENOUS; SUBCUTANEOUS at 14:45

## 2018-11-29 RX ADMIN — SODIUM CHLORIDE: 9 INJECTION, SOLUTION INTRAVENOUS at 14:24

## 2018-11-29 RX ADMIN — Medication 0.5 MG: at 14:45

## 2018-11-29 ASSESSMENT — PAIN DESCRIPTION - DESCRIPTORS: DESCRIPTORS: ACHING

## 2018-11-29 ASSESSMENT — PAIN SCALES - GENERAL
PAINLEVEL_OUTOF10: 6
PAINLEVEL_OUTOF10: 6

## 2018-11-29 ASSESSMENT — PAIN - FUNCTIONAL ASSESSMENT: PAIN_FUNCTIONAL_ASSESSMENT: 0-10

## 2018-12-02 ENCOUNTER — TELEPHONE (OUTPATIENT)
Dept: UROLOGY | Age: 48
End: 2018-12-02

## 2018-12-02 DIAGNOSIS — N39.0 URINARY TRACT INFECTION WITHOUT HEMATURIA, SITE UNSPECIFIED: Primary | ICD-10-CM

## 2018-12-02 LAB
ORGANISM: ABNORMAL
ORGANISM: ABNORMAL
URINE CULTURE REFLEX: ABNORMAL
URINE CULTURE REFLEX: ABNORMAL

## 2018-12-02 RX ORDER — SULFAMETHOXAZOLE AND TRIMETHOPRIM 800; 160 MG/1; MG/1
1 TABLET ORAL 2 TIMES DAILY
Qty: 20 TABLET | Refills: 0 | Status: SHIPPED | OUTPATIENT
Start: 2018-12-02 | End: 2018-12-12

## 2019-03-05 ENCOUNTER — TELEPHONE (OUTPATIENT)
Dept: UROLOGY | Age: 49
End: 2019-03-05

## 2019-03-28 ENCOUNTER — TELEPHONE (OUTPATIENT)
Dept: UROLOGY | Age: 49
End: 2019-03-28

## 2019-04-01 ENCOUNTER — TELEPHONE (OUTPATIENT)
Dept: UROLOGY | Age: 49
End: 2019-04-01

## 2019-04-01 ENCOUNTER — PROCEDURE VISIT (OUTPATIENT)
Dept: UROLOGY | Age: 49
End: 2019-04-01
Payer: MEDICARE

## 2019-04-01 VITALS
DIASTOLIC BLOOD PRESSURE: 102 MMHG | BODY MASS INDEX: 23.46 KG/M2 | SYSTOLIC BLOOD PRESSURE: 146 MMHG | WEIGHT: 146 LBS | HEIGHT: 66 IN

## 2019-04-01 DIAGNOSIS — N32.89 BLADDER SPASMS: ICD-10-CM

## 2019-04-01 DIAGNOSIS — R32 URINARY INCONTINENCE, UNSPECIFIED TYPE: Primary | ICD-10-CM

## 2019-04-01 DIAGNOSIS — Z01.818 PRE-OP TESTING: ICD-10-CM

## 2019-04-01 DIAGNOSIS — G82.20 LOWER PARAPLEGIA (HCC): Chronic | ICD-10-CM

## 2019-04-01 LAB
BILIRUBIN URINE: NEGATIVE
BLOOD URINE, POC: ABNORMAL
CHARACTER, URINE: CLEAR
COLOR, URINE: YELLOW
GLUCOSE URINE: NEGATIVE MG/DL
KETONES, URINE: NEGATIVE
LEUKOCYTE CLUMPS, URINE: ABNORMAL
NITRITE, URINE: POSITIVE
PH, URINE: 6.5 (ref 5–9)
PROTEIN, URINE: 30 MG/DL
SPECIFIC GRAVITY, URINE: >= 1.03 (ref 1–1.03)
UROBILINOGEN, URINE: 0.2 EU/DL (ref 0–1)

## 2019-04-01 PROCEDURE — 81003 URINALYSIS AUTO W/O SCOPE: CPT | Performed by: UROLOGY

## 2019-04-01 PROCEDURE — 52000 CYSTOURETHROSCOPY: CPT | Performed by: UROLOGY

## 2019-04-01 PROCEDURE — 99999 PR OFFICE/OUTPT VISIT,PROCEDURE ONLY: CPT | Performed by: UROLOGY

## 2019-04-01 NOTE — TELEPHONE ENCOUNTER
SURGERY [address]  [address] [place] [address] [address] BRIANA ALEXANDER AM OFFENEGG II.REA, [address]      Phone *[phone] *[phone]   Surgical Scheduling Direct Line Phone *[phone] Fax *Deneen Figueroa. 1970 male    [address] [facility] [address]  Marital Status:          Home Phone: [phone]      Cell Phone:    Telephone Information:   Mobile [phone]          Surgeon: Dr. Rosemary Brown  Surgery Date: 4/9/2019   Time: Leobardo Rehman    Procedure: Revision of artificial sphincter using a double cuff    Diagnosis: Urinary incontinence, bladder spasms, lower paraplegia     Important Medical History: Lower paraplegia, wheelchair    Special Inst/Equip: Regular Room, [facility] rep Cailin Giles e-mail sent    CPT Codes:    04616  Latex Allergy:  No     Cardiac Device:  No     Anesthesia:  Anesthesiologist (General/Spinal)          Admission Type:  Same Day                             Admit Prior to Day of Surgery: No    Case Location:  Main OR           Preadmission Testing:Phone Call              PAT Date and Time:______________________________________________________    PAT Confirmation #: ______________________________________________________    Post Op Visit: ___________________________________________________________    Need Preop Cardiac Clearance: No    Does Patient have Cardiologist/physician?      None    Surgery Confirmation #: __________________________________________________    : ________________________   Date: __________________________     [employer] Name: [place]cia [place] [employer]

## 2019-04-01 NOTE — PROGRESS NOTES
Mr. Paul Cazares was seen in follow up for cystoscopy prior to artifical sphincter surgery. This was completed today without difficulty. Past Medical History:   Diagnosis Date    Arthritis     Chondromatosis     lower spine tumor    Depression     GERD (gastroesophageal reflux disease)     Hypertension     Insomnia     Lower paraplegia (HCC)     ankles down    Meningitis spinal     at 8years old    PONV (postoperative nausea and vomiting)        Past Surgical History:   Procedure Laterality Date    ABDOMEN SURGERY      BACK SURGERY      BLADDER SURGERY      sphincter augmentation   ARTIFICIAL SPHINCTER MRI SAFE 1.5T    COLONOSCOPY      COLOSTOMY      DILATATION, ESOPHAGUS      ENDOSCOPY, COLON, DIAGNOSTIC      FRACTURE SURGERY      NEPHROLITHOTOMY Left 8/15/2017    LEFT PERCUTANEOUS NEPHROLITHOTRIPSY performed by Mele Browne MD at [address]    skin flap bottom    SPINE SURGERY      Multiple    URETER SURGERY      replaced on left as kid    URETHRA SURGERY  02/17/2014    REVISION OF URETHRAL SPHINCTER       Current Outpatient Medications on File Prior to Visit   Medication Sig Dispense Refill    amitriptyline (ELAVIL) 75 MG tablet Take 75 mg by mouth nightly      Pantoprazole Sodium (PROTONIX PO) Take 40 mg by mouth daily       oxybutynin (DITROPAN) 5 MG tablet Take 1 tablet by mouth 3 times daily 270 tablet 3    Misc. Devices (Avie Brome) MISC 4\" RoHo cushion Diagnosis: G82.20 Lower Paraplegia 1 each 0    senna (SENOKOT) 8.6 MG TABS tablet Take 1 tablet by mouth daily 120 tablet 0    traMADol (ULTRAM) 50 MG tablet Take by mouth every 6 hours as needed for Pain 1-2 tab      oxyCODONE-acetaminophen (PERCOCET)  MG per tablet Take 1 tablet by mouth every 8 hours as needed for Pain .       gabapentin (NEURONTIN) 800 MG tablet Take 800 mg by mouth 3 times daily      fentaNYL (DURAGESIC) 25 MCG/HR Place 1 patch onto the skin every 72 hours . No current facility-administered medications on file prior to visit. Allergies   Allergen Reactions    Asa Buff (Mag [Buffered Aspirin]      Tongue swelling    Prednisone      Insomnia, confusion, restless,       Family History   Problem Relation Age of Onset    Diabetes Mother     High Blood Pressure Mother     Kidney Disease Mother     Cancer Neg Hx     Stroke Neg Hx     Heart Disease Neg Hx        Social History     Socioeconomic History    Marital status:       Spouse name: Not on file    Number of children: Not on file    Years of education: Not on file    Highest education level: Not on file   Occupational History    Not on file   Social Needs    Financial resource strain: Not on file    Food insecurity:     Worry: Not on file     Inability: Not on file    Transportation needs:     Medical: Not on file     Non-medical: Not on file   Tobacco Use    Smoking status: Former Smoker     Packs/day: 1.00     Last attempt to quit: 2013     Years since quittin.6    Smokeless tobacco: Never Used    Tobacco comment: using vapor no nicotine   Substance and Sexual Activity    Alcohol use: Yes     Comment: rarely    Drug use: No    Sexual activity: Not Currently   Lifestyle    Physical activity:     Days per week: Not on file     Minutes per session: Not on file    Stress: Not on file   Relationships    Social connections:     Talks on phone: Not on file     Gets together: Not on file     Attends Faith service: Not on file     Active member of club or organization: Not on file     Attends meetings of clubs or organizations: Not on file     Relationship status: Not on file    Intimate partner violence:     Fear of current or ex partner: Not on file     Emotionally abused: Not on file     Physically abused: Not on file     Forced sexual activity: Not on file   Other Topics Concern    Not on file   Social History Narrative    Not on file       Review of Systems  No problems with ears, nose or throat. No problems with eyes. No chest pain, shortness of breath, abdominal pain, extremity pain or weakness, and no neurological deficits. No rashes. No swollen glands or lymph nodes.  symptoms per HPI. The remainder of the review of symptoms is negative. Exam  General: alert and oriented. Cooperative. HENT: Normocephalic, Atraumatic. Eyes: No scleral icterus, mucous membranes moist.  Respiratory: Effort normal.   Skin: No rashes or obvious lesions. Labs    Results for POC orders placed in visit on 04/01/19   POCT Urinalysis No Micro (Auto)   Result Value Ref Range    Glucose, Ur Negative NEGATIVE mg/dl    Bilirubin Urine Negative     Ketones, Urine Negative NEGATIVE    Specific Gravity, Urine >= 1.030 1.002 - 1.03    Blood, UA POC Trace-intact NEGATIVE    pH, Urine 6.50 5.0 - 9.0    Protein, Urine 30 (A) NEGATIVE mg/dl    Urobilinogen, Urine 0.20 0.0 - 1.0 eu/dl    Nitrite, Urine Positive (A) NEGATIVE    Leukocyte Clumps, Urine Small (A) NEGATIVE    Color, Urine Yellow YELLOW-STR    Character, Urine Clear [email or web address]       Lab Results   Component Value Date    CREATININE 0.8 10/26/2018    BUN 9 10/26/2018     10/26/2018    K 3.7 10/26/2018     10/26/2018    CO2 27 10/26/2018         Cystoscopy  After obtaining informed consent and prepping the urethral meatus, a 16-[place] Islander flexible cystoscope was passed per urethra into the bladder. The urethra was evaluated on the way in and then again on the way out and was found to be normal.  The prostate was mildly obstructing. The bladder was evaluated in its endoscopic entirety and found to be normal.  There were no tumors, stones, ulcers or foreign bodies. The ureteral orifices were seen and were normal.  The scope was removed. The patient tolerated the procedure and there were no complications. A dose of 500 mg ciprofloxacin was given for the procedure.     Impression: normal cystoscopy  No scar tissue, no erosion       Plan:    Schedule with artifical sphincter surgery. Urine culture sent today. Needs clean culture prior to surgery       I described the procedure in detail and also described the associated risks and benefits at length. We discussed possible alternative therapies. We discussed the risks and benefits of not undergoing therapy. Martita Eleni understands these risks and benefits and desires to proceed. He will be scheduled for the procedure in the very near future.

## 2019-04-01 NOTE — TELEPHONE ENCOUNTER
Dr Jovi Stone is asking for medical clearance for the revision of the artificial sphincter. Scheduled on 4/9/2019. This surgery was previously scheduled 11/29/18. Cancelled due to urinary tract infections.

## 2019-04-03 ENCOUNTER — OFFICE VISIT (OUTPATIENT)
Dept: FAMILY MEDICINE CLINIC | Age: 49
End: 2019-04-03
Payer: MEDICARE

## 2019-04-03 VITALS
TEMPERATURE: 98.8 F | OXYGEN SATURATION: 98 % | DIASTOLIC BLOOD PRESSURE: 88 MMHG | RESPIRATION RATE: 16 BRPM | HEART RATE: 106 BPM | SYSTOLIC BLOOD PRESSURE: 138 MMHG | HEIGHT: 66 IN | BODY MASS INDEX: 23.46 KG/M2 | WEIGHT: 146 LBS

## 2019-04-03 DIAGNOSIS — I10 HTN (HYPERTENSION), BENIGN: ICD-10-CM

## 2019-04-03 DIAGNOSIS — N32.89 BLADDER SPASMS: Primary | ICD-10-CM

## 2019-04-03 DIAGNOSIS — R32 URINARY INCONTINENCE, UNSPECIFIED TYPE: ICD-10-CM

## 2019-04-03 DIAGNOSIS — K21.9 GASTROESOPHAGEAL REFLUX DISEASE, ESOPHAGITIS PRESENCE NOT SPECIFIED: Chronic | ICD-10-CM

## 2019-04-03 DIAGNOSIS — Z01.818 PRE-OP TESTING: ICD-10-CM

## 2019-04-03 DIAGNOSIS — G82.20 LOWER PARAPLEGIA (HCC): ICD-10-CM

## 2019-04-03 LAB
ANION GAP SERPL CALCULATED.3IONS-SCNC: 16 MEQ/L (ref 8–16)
BASOPHILS # BLD: 0.8 %
BASOPHILS ABSOLUTE: 0.1 THOU/MM3 (ref 0–0.1)
BUN BLDV-MCNC: 10 MG/DL (ref 7–22)
CALCIUM SERPL-MCNC: 8.9 MG/DL (ref 8.5–10.5)
CHLORIDE BLD-SCNC: 101 MEQ/L (ref 98–111)
CO2: 24 MEQ/L (ref 23–33)
CREAT SERPL-MCNC: 0.7 MG/DL (ref 0.4–1.2)
EOSINOPHIL # BLD: 1.4 %
EOSINOPHILS ABSOLUTE: 0.1 THOU/MM3 (ref 0–0.4)
ERYTHROCYTE [DISTWIDTH] IN BLOOD BY AUTOMATED COUNT: 15.8 % (ref 11.5–14.5)
ERYTHROCYTE [DISTWIDTH] IN BLOOD BY AUTOMATED COUNT: 47.3 FL (ref 35–45)
GFR SERPL CREATININE-BSD FRML MDRD: > 90 ML/MIN/1.73M2
GLUCOSE BLD-MCNC: 127 MG/DL (ref 70–108)
HCT VFR BLD CALC: 43.1 % (ref 42–52)
HEMOGLOBIN: 13.3 GM/DL (ref 14–18)
IMMATURE GRANS (ABS): 0.02 THOU/MM3 (ref 0–0.07)
IMMATURE GRANULOCYTES: 0.3 %
LYMPHOCYTES # BLD: 21.9 %
LYMPHOCYTES ABSOLUTE: 1.4 THOU/MM3 (ref 1–4.8)
MCH RBC QN AUTO: 25.5 PG (ref 26–33)
MCHC RBC AUTO-ENTMCNC: 30.9 GM/DL (ref 32.2–35.5)
MCV RBC AUTO: 82.6 FL (ref 80–94)
MONOCYTES # BLD: 7.8 %
MONOCYTES ABSOLUTE: 0.5 THOU/MM3 (ref 0.4–1.3)
NUCLEATED RED BLOOD CELLS: 0 /100 WBC
PLATELET # BLD: 344 THOU/MM3 (ref 130–400)
PMV BLD AUTO: 11 FL (ref 9.4–12.4)
POTASSIUM SERPL-SCNC: 3.8 MEQ/L (ref 3.5–5.2)
RBC # BLD: 5.22 MILL/MM3 (ref 4.7–6.1)
SEG NEUTROPHILS: 67.8 %
SEGMENTED NEUTROPHILS ABSOLUTE COUNT: 4.4 THOU/MM3 (ref 1.8–7.7)
SODIUM BLD-SCNC: 141 MEQ/L (ref 135–145)
WBC # BLD: 6.5 THOU/MM3 (ref 4.8–10.8)

## 2019-04-03 PROCEDURE — G8427 DOCREV CUR MEDS BY ELIG CLIN: HCPCS | Performed by: FAMILY MEDICINE

## 2019-04-03 PROCEDURE — G8420 CALC BMI NORM PARAMETERS: HCPCS | Performed by: FAMILY MEDICINE

## 2019-04-03 PROCEDURE — 99214 OFFICE O/P EST MOD 30 MIN: CPT | Performed by: FAMILY MEDICINE

## 2019-04-03 PROCEDURE — 1036F TOBACCO NON-USER: CPT | Performed by: FAMILY MEDICINE

## 2019-04-03 ASSESSMENT — PATIENT HEALTH QUESTIONNAIRE - PHQ9
SUM OF ALL RESPONSES TO PHQ QUESTIONS 1-9: 0
SUM OF ALL RESPONSES TO PHQ QUESTIONS 1-9: 0
SUM OF ALL RESPONSES TO PHQ9 QUESTIONS 1 & 2: 0
2. FEELING DOWN, DEPRESSED OR HOPELESS: 0
1. LITTLE INTEREST OR PLEASURE IN DOING THINGS: 0

## 2019-04-04 ENCOUNTER — TELEPHONE (OUTPATIENT)
Dept: UROLOGY | Age: 49
End: 2019-04-04

## 2019-04-04 DIAGNOSIS — N39.0 RECURRENT URINARY TRACT INFECTION: Primary | ICD-10-CM

## 2019-04-04 DIAGNOSIS — R32 URINARY INCONTINENCE, UNSPECIFIED TYPE: ICD-10-CM

## 2019-04-04 LAB
ORGANISM: ABNORMAL
ORGANISM: ABNORMAL
URINE CULTURE, ROUTINE: ABNORMAL
URINE CULTURE, ROUTINE: ABNORMAL

## 2019-04-04 RX ORDER — SULFAMETHOXAZOLE AND TRIMETHOPRIM 800; 160 MG/1; MG/1
1 TABLET ORAL 2 TIMES DAILY
Qty: 14 TABLET | Refills: 0 | Status: SHIPPED | OUTPATIENT
Start: 2019-04-04 | End: 2019-04-11

## 2019-04-04 RX ORDER — LEVOFLOXACIN 500 MG/1
500 TABLET, FILM COATED ORAL DAILY
Qty: 10 TABLET | Refills: 0 | Status: SHIPPED | OUTPATIENT
Start: 2019-04-04 | End: 2019-04-14

## 2019-04-04 ASSESSMENT — ENCOUNTER SYMPTOMS
DIARRHEA: 0
COUGH: 0
SORE THROAT: 0
SINUS PRESSURE: 0
SHORTNESS OF BREATH: 0
NAUSEA: 0
RHINORRHEA: 0
EYE PAIN: 0
ABDOMINAL DISTENTION: 0
CONSTIPATION: 0
ABDOMINAL PAIN: 0

## 2019-04-04 NOTE — TELEPHONE ENCOUNTER
Chester Danielson was notified of the urine results. The antibiotics to be take at the same time. Then followed with a test of cure urine with reflex culture. Referral to Marissa Linda CNP in infectious disease placed. Her office will contact patient to schedule. Orders mailed to pt. Chester Danielson understands the surgery on 4/9/19 was cancelled. He understands why. [facility] rep Otoniel Hinkle notified per e-mail the case is cancelled. Jennifer Walker in [address] cancelled the case.

## 2019-04-04 NOTE — PROGRESS NOTES
Maria Luisa Iyer's surgery scheduler was informed he was cleared for surgery and can print this note from epic.

## 2019-04-04 NOTE — PROGRESS NOTES
[address] [address]  Dept: [phone]  Dept Fax: [phone]  Loc: [phone]  PROGRESS NOTE      VisitDate: 4/3/2019    Malina Odonnell. is a 52 y.o. male who presents today for:     Chief Complaint   Patient presents with    Pre-op Exam     -04/09/2019-Revision of Artificial Sphincter--Last EKG done 10/26/2018         Subjective:  HPI  Patient comes in for evaluation for upcoming surgery. Going to have a revision of his artificial sphincter. No history of reactions to anesthesia. No history of bleeding or clotting disorders. No recent cardiac symptoms. Review of Systems   Constitutional: Negative for appetite change and fever. HENT: Negative for congestion, ear pain, postnasal drip, rhinorrhea, sinus pressure and sore throat. Eyes: Negative for pain and visual disturbance. Respiratory: Negative for cough and shortness of breath. Cardiovascular: Negative for chest pain. Gastrointestinal: Negative for abdominal distention, abdominal pain, constipation, diarrhea and nausea. Genitourinary: Negative for dysuria, frequency and urgency. Musculoskeletal: Negative for arthralgias. Skin: Negative for rash. Neurological: Negative for dizziness.      Past Medical History:   Diagnosis Date    Arthritis     Chondromatosis     lower spine tumor    Depression     GERD (gastroesophageal reflux disease)     Hypertension     Insomnia     Lower paraplegia (HCC)     ankles down    Meningitis spinal     at 8years old    PONV (postoperative nausea and vomiting)       Past Surgical History:   Procedure Laterality Date    ABDOMEN SURGERY      BACK SURGERY      BLADDER SURGERY      sphincter augmentation   ARTIFICIAL SPHINCTER MRI SAFE 1.5T    COLONOSCOPY      COLOSTOMY      DILATATION, ESOPHAGUS      ENDOSCOPY, COLON, DIAGNOSTIC      FRACTURE SURGERY      NEPHROLITHOTOMY Allergies   Allergen Reactions    Asa Buff (Mag [Buffered Aspirin]      Tongue swelling    Prednisone      Insomnia, confusion, restless,     Health Maintenance   Topic Date Due    HIV screen  01/23/1985    DTaP/Tdap/Td vaccine (1 - Tdap) 01/23/1989    Potassium monitoring  10/26/2019    Creatinine monitoring  10/26/2019    Lipid screen  05/31/2022    Flu vaccine  Completed         Objective:     Physical Exam   Constitutional: He is oriented to person, place, and time. He appears well-developed and well-nourished. No distress. HENT:   Head: Normocephalic and atraumatic. Right Ear: External ear normal.   Left Ear: External ear normal.   Eyes: Conjunctivae are normal.   Neck: No JVD present. Cardiovascular: Normal rate, regular rhythm and normal heart sounds. Pulmonary/Chest: Effort normal and breath sounds normal. He has no wheezes. He has no rales. Musculoskeletal: He exhibits no edema or tenderness. Neurological: He is alert and oriented to person, place, and time. He displays abnormal reflex. A sensory deficit is present. He exhibits abnormal muscle tone. Coordination abnormal.   Skin: Skin is warm and dry. He is not diaphoretic. No pallor. EKG: normal sinus rhythm. /88   Pulse 106   Temp 98.8 °F (37.1 °C) (Oral)   Resp 16   Ht 5' 6\" (1.676 m)   Wt 146 lb (66.2 kg)   SpO2 98%   BMI 23.57 kg/m²     Lab Results   Component Value Date    WBC 6.5 04/03/2019    HGB 13.3 (L) 04/03/2019    HCT 43.1 04/03/2019    MCV 82.6 04/03/2019     04/03/2019     Lab Results   Component Value Date     04/03/2019    K 3.8 04/03/2019     04/03/2019    CO2 24 04/03/2019    BUN 10 04/03/2019    CREATININE 0.7 04/03/2019    GLUCOSE 127 04/03/2019    GLUCOSE 83 05/31/2017    CALCIUM 8.9 04/03/2019        Impression/Plan:  1. Bladder spasms    2. Lower paraplegia ([address]    3. HTN (hypertension), benign    4. Gastroesophageal reflux disease, esophagitis presence not specified    5. Urinary incontinence, unspecified type    6. Pre-op testing      Requested Prescriptions      No prescriptions requested or ordered in this encounter     Orders Placed This Encounter   Procedures    Anion Gap    Glomerular Filtration Rate, Estimated   No contraindication to planned surgery patient is cleared for OR    Patient giveneducational materials - see patient instructions. Discussed use, benefit, and side effects of prescribed medications. All patient questions answered. Pt voiced understanding. Reviewed health maintenance. Patient agreedwith treatment plan. Follow up as directed. **This report has been created using voice recognition software. It may contain minor errorswhich are inherent in voice recognition technology. **       Electronically signed by Brandon Palafox MD on 4/4/2019 at 1:13 PM

## 2019-04-04 NOTE — TELEPHONE ENCOUNTER
I called the patient and advised him of the message from Katherine Be CNP. Urine culture showed infection. Bactrim sent for treatment. He voiced understanding.

## 2019-04-04 NOTE — TELEPHONE ENCOUNTER
Please let pt know his urine showed a second organism and we are calling in another antibiotic (Levaquin). I want him to continue the Bactrim that was prescribed by [facility] and start Levaquin today. The second organism can only be treated with flouroquinolone orally and the first organism is not susceptible to that medication. Pt's surgery will need to be rescheduled until after completion of antibiotics since he is having surgery on an implanted device. We will need to have a test of cure urine with reflex culture on this patient after completion of both antibiotics. Pt will then need another urine with reflex 3 days prior to rescheduled surgery. This patient has recurrent infections. Can we please have him see ID for assistance with treatment and procedural prophylaxis. His surgery has been rescheduled in the past for the same issue.

## 2019-04-08 ENCOUNTER — TELEPHONE (OUTPATIENT)
Dept: INFECTIOUS DISEASES | Age: 49
End: 2019-04-08

## 2019-04-09 NOTE — TELEPHONE ENCOUNTER
The antibiotics were making him sick to his stomach and dizzy when he sat up. He lost his appetite when he was taking. He stopped them Sunday morning. He feels better today and states the dizziness is gone. Please advise. Thank you.

## 2019-04-09 NOTE — TELEPHONE ENCOUNTER
Please call patient and clarify his symptoms. If he is having severe dizziness he needs to be evaluated by PCP.

## 2019-04-09 NOTE — TELEPHONE ENCOUNTER
I attempted to call the patient regarding the message below. I left a voicemail to return the call to the office.

## 2019-04-11 ENCOUNTER — APPOINTMENT (OUTPATIENT)
Dept: CT IMAGING | Age: 49
End: 2019-04-11
Payer: MEDICARE

## 2019-04-11 ENCOUNTER — HOSPITAL ENCOUNTER (EMERGENCY)
Age: 49
Discharge: HOME | End: 2019-04-12
Attending: FAMILY MEDICINE
Payer: MEDICARE

## 2019-04-11 DIAGNOSIS — I10 HYPERTENSION, UNSPECIFIED TYPE: Primary | ICD-10-CM

## 2019-04-11 DIAGNOSIS — N39.0 URINARY TRACT INFECTION WITHOUT HEMATURIA, SITE UNSPECIFIED: ICD-10-CM

## 2019-04-11 DIAGNOSIS — R42 DIZZINESS: ICD-10-CM

## 2019-04-11 LAB
ALBUMIN SERPL-MCNC: 3.7 G/DL (ref 3.5–5.1)
ALP BLD-CCNC: 112 U/L (ref 38–126)
ALT SERPL-CCNC: 10 U/L (ref 11–66)
ANION GAP SERPL CALCULATED.3IONS-SCNC: 9 MEQ/L (ref 8–16)
AST SERPL-CCNC: 15 U/L (ref 5–40)
BASOPHILS # BLD: 0.8 %
BASOPHILS ABSOLUTE: 0.1 THOU/MM3 (ref 0–0.1)
BILIRUB SERPL-MCNC: 0.2 MG/DL (ref 0.3–1.2)
BILIRUBIN DIRECT: < 0.2 MG/DL (ref 0–0.3)
BUN BLDV-MCNC: 12 MG/DL (ref 7–22)
CALCIUM SERPL-MCNC: 8.9 MG/DL (ref 8.5–10.5)
CHLORIDE BLD-SCNC: 107 MEQ/L (ref 98–111)
CO2: 22 MEQ/L (ref 23–33)
CREAT SERPL-MCNC: 0.7 MG/DL (ref 0.4–1.2)
EKG ATRIAL RATE: 81 BPM
EKG P AXIS: 54 DEGREES
EKG P-R INTERVAL: 138 MS
EKG Q-T INTERVAL: 390 MS
EKG QRS DURATION: 90 MS
EKG QTC CALCULATION (BAZETT): 453 MS
EKG R AXIS: 43 DEGREES
EKG T AXIS: 41 DEGREES
EKG VENTRICULAR RATE: 81 BPM
EOSINOPHIL # BLD: 1 %
EOSINOPHILS ABSOLUTE: 0.1 THOU/MM3 (ref 0–0.4)
ERYTHROCYTE [DISTWIDTH] IN BLOOD BY AUTOMATED COUNT: 15.9 % (ref 11.5–14.5)
ERYTHROCYTE [DISTWIDTH] IN BLOOD BY AUTOMATED COUNT: 47.8 FL (ref 35–45)
GFR SERPL CREATININE-BSD FRML MDRD: > 90 ML/MIN/1.73M2
GLUCOSE BLD-MCNC: 126 MG/DL (ref 70–108)
HCT VFR BLD CALC: 38 % (ref 42–52)
HEMOGLOBIN: 12 GM/DL (ref 14–18)
IMMATURE GRANS (ABS): 0.03 THOU/MM3 (ref 0–0.07)
IMMATURE GRANULOCYTES: 0.3 %
LYMPHOCYTES # BLD: 20.2 %
LYMPHOCYTES ABSOLUTE: 1.8 THOU/MM3 (ref 1–4.8)
MCH RBC QN AUTO: 25.8 PG (ref 26–33)
MCHC RBC AUTO-ENTMCNC: 31.6 GM/DL (ref 32.2–35.5)
MCV RBC AUTO: 81.5 FL (ref 80–94)
MONOCYTES # BLD: 6.3 %
MONOCYTES ABSOLUTE: 0.6 THOU/MM3 (ref 0.4–1.3)
NUCLEATED RED BLOOD CELLS: 0 /100 WBC
OSMOLALITY CALCULATION: 277 MOSMOL/KG (ref 275–300)
PLATELET # BLD: 292 THOU/MM3 (ref 130–400)
PMV BLD AUTO: 10.3 FL (ref 9.4–12.4)
POTASSIUM SERPL-SCNC: 3.6 MEQ/L (ref 3.5–5.2)
RBC # BLD: 4.66 MILL/MM3 (ref 4.7–6.1)
SEG NEUTROPHILS: 71.4 %
SEGMENTED NEUTROPHILS ABSOLUTE COUNT: 6.5 THOU/MM3 (ref 1.8–7.7)
SODIUM BLD-SCNC: 138 MEQ/L (ref 135–145)
TOTAL PROTEIN: 6.9 G/DL (ref 6.1–8)
TROPONIN T: < 0.01 NG/ML
WBC # BLD: 9.1 THOU/MM3 (ref 4.8–10.8)

## 2019-04-11 PROCEDURE — 70450 CT HEAD/BRAIN W/O DYE: CPT

## 2019-04-11 PROCEDURE — 87804 INFLUENZA ASSAY W/OPTIC: CPT

## 2019-04-11 PROCEDURE — 84484 ASSAY OF TROPONIN QUANT: CPT

## 2019-04-11 PROCEDURE — 93005 ELECTROCARDIOGRAM TRACING: CPT | Performed by: FAMILY MEDICINE

## 2019-04-11 PROCEDURE — 85025 COMPLETE CBC W/AUTO DIFF WBC: CPT

## 2019-04-11 PROCEDURE — 81001 URINALYSIS AUTO W/SCOPE: CPT

## 2019-04-11 PROCEDURE — 80053 COMPREHEN METABOLIC PANEL: CPT

## 2019-04-11 PROCEDURE — 36415 COLL VENOUS BLD VENIPUNCTURE: CPT

## 2019-04-11 PROCEDURE — 82248 BILIRUBIN DIRECT: CPT

## 2019-04-11 PROCEDURE — 87086 URINE CULTURE/COLONY COUNT: CPT

## 2019-04-11 PROCEDURE — 99285 EMERGENCY DEPT VISIT HI MDM: CPT

## 2019-04-11 RX ORDER — MECLIZINE HYDROCHLORIDE CHEWABLE TABLETS 25 MG/1
25 TABLET, CHEWABLE ORAL ONCE
Status: COMPLETED | OUTPATIENT
Start: 2019-04-12 | End: 2019-04-12

## 2019-04-12 VITALS
SYSTOLIC BLOOD PRESSURE: 160 MMHG | HEART RATE: 83 BPM | RESPIRATION RATE: 16 BRPM | OXYGEN SATURATION: 98 % | DIASTOLIC BLOOD PRESSURE: 100 MMHG | TEMPERATURE: 98.5 F

## 2019-04-12 LAB
BACTERIA: ABNORMAL /HPF
BILIRUBIN URINE: NEGATIVE
BLOOD, URINE: NEGATIVE
CASTS 2: ABNORMAL /LPF
CASTS UA: ABNORMAL /LPF
CHARACTER, URINE: ABNORMAL
COLOR: YELLOW
CRYSTALS, UA: ABNORMAL
EPITHELIAL CELLS, UA: ABNORMAL /HPF
FLU A ANTIGEN: NEGATIVE
FLU B ANTIGEN: NEGATIVE
GLUCOSE URINE: NEGATIVE MG/DL
KETONES, URINE: NEGATIVE
LEUKOCYTE ESTERASE, URINE: ABNORMAL
MISCELLANEOUS 2: ABNORMAL
MISCELLANEOUS LAB TEST RESULT: ABNORMAL
NITRITE, URINE: NEGATIVE
PH UA: 7.5 (ref 5–9)
PROTEIN UA: NEGATIVE
RBC URINE: ABNORMAL /HPF
RENAL EPITHELIAL, UA: ABNORMAL
SPECIFIC GRAVITY, URINE: 1.01 (ref 1–1.03)
UROBILINOGEN, URINE: 1 EU/DL (ref 0–1)
WBC UA: ABNORMAL /HPF
YEAST: ABNORMAL

## 2019-04-12 PROCEDURE — 6370000000 HC RX 637 (ALT 250 FOR IP): Performed by: FAMILY MEDICINE

## 2019-04-12 PROCEDURE — 93010 ELECTROCARDIOGRAM REPORT: CPT | Performed by: INTERNAL MEDICINE

## 2019-04-12 RX ORDER — GRANULES FOR ORAL 3 G/1
3 POWDER ORAL ONCE
Status: COMPLETED | OUTPATIENT
Start: 2019-04-12 | End: 2019-04-12

## 2019-04-12 RX ORDER — CLONIDINE HYDROCHLORIDE 0.2 MG/1
0.2 TABLET ORAL ONCE
Status: COMPLETED | OUTPATIENT
Start: 2019-04-12 | End: 2019-04-12

## 2019-04-12 RX ORDER — OXYCODONE HYDROCHLORIDE AND ACETAMINOPHEN 5; 325 MG/1; MG/1
2 TABLET ORAL ONCE
Status: COMPLETED | OUTPATIENT
Start: 2019-04-12 | End: 2019-04-12

## 2019-04-12 RX ORDER — MECLIZINE HYDROCHLORIDE 25 MG/1
25 TABLET ORAL 3 TIMES DAILY PRN
Qty: 30 TABLET | Refills: 0 | Status: SHIPPED | OUTPATIENT
Start: 2019-04-12 | End: 2019-04-22

## 2019-04-12 RX ORDER — AMLODIPINE BESYLATE 10 MG/1
10 TABLET ORAL DAILY
Qty: 30 TABLET | Refills: 0 | Status: ON HOLD | OUTPATIENT
Start: 2019-04-12 | End: 2019-05-10 | Stop reason: ALTCHOICE

## 2019-04-12 RX ADMIN — FOSFOMYCIN TROMETHAMINE 1 PACKET: 3 POWDER ORAL at 01:46

## 2019-04-12 RX ADMIN — CLONIDINE HYDROCHLORIDE 0.2 MG: 0.2 TABLET ORAL at 01:23

## 2019-04-12 RX ADMIN — MECLIZINE HCL 25 MG: 25 TABLET, CHEWABLE ORAL at 00:37

## 2019-04-12 RX ADMIN — OXYCODONE HYDROCHLORIDE AND ACETAMINOPHEN 2 TABLET: 5; 325 TABLET ORAL at 00:38

## 2019-04-12 ASSESSMENT — ENCOUNTER SYMPTOMS
WHEEZING: 0
SORE THROAT: 0
ABDOMINAL PAIN: 0
EYE DISCHARGE: 0
SHORTNESS OF BREATH: 0
BACK PAIN: 0
DIARRHEA: 0
EYE REDNESS: 0
RHINORRHEA: 0
NAUSEA: 0
VOMITING: 0
COUGH: 0

## 2019-04-12 ASSESSMENT — PAIN SCALES - GENERAL
PAINLEVEL_OUTOF10: 9
PAINLEVEL_OUTOF10: 5

## 2019-04-12 NOTE — ED NOTES
Patient taking pre-op antibiotics for artificail sphincter. Patient states that the first several days he felt fatigued, symptoms improved. Patient states that he was instructed to come to ED for recurrent episodes. Patient states that current bout of dizziness started approx 9am today. Patient wears fentanyl patch for pain.       Ricardo Reese RN  04/11/19 2215       Ricardo Reese RN  04/11/19 3205 .

## 2019-04-12 NOTE — ED PROVIDER NOTES
[facility]  eMERGENCY dEPARTMENT eNCOUnter          [address] [facility]       Chief Complaint   Patient presents with    Dizziness     taking antibiotics    Fatigue       Nurses Notes reviewed and I agree except as noted in the HPI. HISTORY OF PRESENT ILLNESS    Franchesca Voss is a 52 y.o. male who presents to the Emergency Department for the evaluation of fatigue, dizziness, and generalized weakness. The patient has chronic paraplegia of the lower extremities and gets around in a wheelchair. 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 Pushpa Lacy for Urology. Patient is due to have a revision of the sphincter. Patient is currently taking pre-op antibiotics as of 1 week ago. Over the past 5 to 7 days, patient states he has felt \"crappy\". He reports decrease in energy. Appetite has been normal. He contacted Dr. Pushpa Lacy over the weekend and was told to stop the antibiotics for now. Patient began feeling somewhat better, but woke up this morning feeling worse again. He denies fever. He denies nausea or vomiting. He denies abdominal pain. There are no other complaints or symptoms at this time. The HPI was provided by the patient. REVIEW OF SYSTEMS     Review of Systems   Constitutional: Positive for activity change and fatigue. Negative for appetite change, chills and fever. HENT: Negative for congestion, ear pain, rhinorrhea and sore throat. Eyes: Negative for discharge, redness and visual disturbance. Respiratory: Negative for cough, shortness of breath and wheezing. Cardiovascular: Negative for chest pain, palpitations and leg swelling. Gastrointestinal: Negative for abdominal pain, diarrhea, nausea and vomiting. Genitourinary: Negative for decreased urine volume, difficulty urinating, dysuria and hematuria. Musculoskeletal: Negative for arthralgias, back pain, joint swelling and neck pain.    Skin: Negative for pallor and tablet Take 800 mg by mouth 3 times dailyHistorical Med      fentaNYL (DURAGESIC) 25 MCG/HR Place 1 patch onto the skin every 72 hours . Historical Med             ALLERGIES     is allergic to asa buff (mag [buffered aspirin] and prednisone. FAMILY HISTORY     indicated that his mother is alive. He indicated that his father is . He indicated that the status of his neg hx is unknown.   family history includes Diabetes in his mother; High Blood Pressure in his mother; Kidney Disease in his mother. SOCIAL HISTORY      reports that he quit smoking about 5 years ago. He smoked 1.00 pack per day. He has never used smokeless tobacco. He reports that he drinks alcohol. He reports that he does not use drugs. PHYSICAL EXAM     INITIAL VITALS:  temperature is 98.5 °F (36.9 °C). His blood pressure is 160/100 (abnormal) and his pulse is 83. His respiration is 16 and oxygen saturation is 98%. Physical Exam   Constitutional: He is oriented to person, place, and time. He appears well-developed and well-nourished. HENT:   Head: Normocephalic and atraumatic. Right Ear: External ear normal.   Left Ear: External ear normal.   Eyes: Conjunctivae are normal. Right eye exhibits no discharge. Left eye exhibits no discharge. No scleral icterus. Neck: Normal range of motion. Neck supple. No JVD present. Cardiovascular: Normal rate and regular rhythm. Pulmonary/Chest: Effort normal. No stridor. No respiratory distress. Abdominal: Soft. He exhibits no distension. There is no tenderness. Musculoskeletal: Normal range of motion. He exhibits no edema. Neurological: He is alert and oriented to person, place, and time. He exhibits normal muscle tone. GCS eye subscore is 4. GCS verbal subscore is 5. GCS motor subscore is 6. Skin: Skin is warm and dry. He is not diaphoretic. No erythema. Psychiatric: He has a normal mood and affect.  His behavior is normal.   Nursing note and vitals reviewed. DIFFERENTIALDIAGNOSIS:   Differential including, but not limited to dehydration, stroke, hypertension, hypertensive emergency, ACS    DIAGNOSTIC RESULTS     EKG: All EKG's are interpreted by the Emergency Justin Cancino who either signs or Co-signs this chart in the absence of a cardiologist.  Normal sinus rhythm, no stemi. RADIOLOGY: non-plain film images(s) such as CT, Ultrasound and MRI are read by the radiologist.    CT HEAD WO CONTRAST   Final Result   Negative CT of the brain      **This report has been created using voice recognition software. It may contain minor errors which are inherent in voice recognition technology. **      Final report electronically signed by Dr. Miles Tovar on 4/11/2019 11:07 PM          LABS:   Labs Reviewed   CBC WITH AUTO DIFFERENTIAL - Abnormal; Notable for the following components:       Result Value    RBC 4.66 (*)     Hemoglobin 12.0 (*)     Hematocrit 38.0 (*)     MCH 25.8 (*)     MCHC 31.6 (*)     RDW-CV 15.9 (*)     RDW-SD 47.8 (*)     All other components within normal limits   BASIC METABOLIC PANEL - Abnormal; Notable for the following components:    CO2 22 (*)     Glucose 126 (*)     All other components within normal limits   HEPATIC FUNCTION PANEL - Abnormal; Notable for the following components:     Total Bilirubin 0.2 (*)     ALT 10 (*)     All other components within normal limits   URINE WITH REFLEXED MICRO - Abnormal; Notable for the following components:    Leukocyte Esterase, Urine LARGE (*)     All other components within normal limits   RAPID INFLUENZA A/B ANTIGENS   URINE CULTURE, REFLEXED   TROPONIN   ANION GAP   OSMOLALITY   GLOMERULAR FILTRATION RATE, ESTIMATED       EMERGENCYDEPARTMENT COURSE:   Vitals:    Vitals:    04/12/19 0109 04/12/19 0123 04/12/19 0149 04/12/19 0200   BP: (!) 161/119 (!) 161/119 (!) 160/100    Pulse: 83      Resp: 29   16   Temp:       SpO2:         2225 Labs ordered    2355 Antivert given    This evaluated edematous for dizziness and fatigue. Night score 0. No focal neurological event deficits. Labwork was done as mentioned above. Possible urinary tract infection. Treated with fosfomycin. Ct head negative. Patient stated that he had stroke/TIA workup which was negative few months ago. Patient also noticed elevated blood pressure. Did not have any headache I focal neurological exam.  Patient was treated with Clonidine. I discussed with the patient admission for further management of his blood pressure. Patient declined and stated that he wants to go home. Patient was started on Norvasc and was advised to follow upwith PCP in 2-3 days. PROCEDURES:  None    FINAL IMPRESSION      1. Hypertension, unspecified type    2. Urinary tract infection without hematuria, site unspecified    3. Dizziness          DISPOSITION/PLAN   Discharge    PATIENT REFERRED TO:  [facility] [address] [place] [address] 83  [phone]    Schedule an appointment as soon as possible for a visit in 3 days        DISCHARGE MEDICATIONS:  Discharge Medication List as of 4/12/2019  1:51 AM      START taking these medications    Details   amLODIPine (NORVASC) 10 MG tablet Take 1 tablet by mouth daily, Disp-30 tablet, R-0Print      meclizine (ANTIVERT) 25 MG tablet Take 1 tablet by mouth 3 times daily as needed for Dizziness, Disp-30 tablet, R-0Print             (Please note that portions of this note were completed with a voice recognition program.  Efforts weremade to edit the dictations but occasionally words are mis-transcribed.)    Scribe:  Signed by: Kamini Land, 4/11/19 11:45 PM Scribing for and in the presence Aurelia Knowles MD    Provider:  I personally performed the services described in the documentation, reviewed and edited the documentation which was dictated to the scribe in my presence, and it accurately records mywords and actions.     Delvin Yu MD 4/11/19 2:12 AM Laina Armas MD  04/12/19 0841

## 2019-04-13 LAB
ORGANISM: ABNORMAL
URINE CULTURE REFLEX: ABNORMAL

## 2019-04-15 ENCOUNTER — TELEPHONE (OUTPATIENT)
Dept: FAMILY MEDICINE CLINIC | Age: 49
End: 2019-04-15

## 2019-04-15 ENCOUNTER — TELEPHONE (OUTPATIENT)
Dept: UROLOGY | Age: 49
End: 2019-04-15

## 2019-04-15 ENCOUNTER — OFFICE VISIT (OUTPATIENT)
Dept: INFECTIOUS DISEASES | Age: 49
End: 2019-04-15
Payer: MEDICARE

## 2019-04-15 VITALS
TEMPERATURE: 100.2 F | SYSTOLIC BLOOD PRESSURE: 160 MMHG | BODY MASS INDEX: 23.46 KG/M2 | HEART RATE: 91 BPM | WEIGHT: 145.94 LBS | HEIGHT: 66 IN | DIASTOLIC BLOOD PRESSURE: 89 MMHG

## 2019-04-15 DIAGNOSIS — Z22.39 COLONIZATION WITH DRUG-RESISTANT BACTERIA: Primary | ICD-10-CM

## 2019-04-15 DIAGNOSIS — R82.71 BACTERIURIA, ASYMPTOMATIC: ICD-10-CM

## 2019-04-15 PROBLEM — N39.0 RECURRENT UTI: Status: ACTIVE | Noted: 2019-04-15

## 2019-04-15 PROCEDURE — 1036F TOBACCO NON-USER: CPT | Performed by: NURSE PRACTITIONER

## 2019-04-15 PROCEDURE — 99213 OFFICE O/P EST LOW 20 MIN: CPT | Performed by: NURSE PRACTITIONER

## 2019-04-15 PROCEDURE — G8427 DOCREV CUR MEDS BY ELIG CLIN: HCPCS | Performed by: NURSE PRACTITIONER

## 2019-04-15 PROCEDURE — G8420 CALC BMI NORM PARAMETERS: HCPCS | Performed by: NURSE PRACTITIONER

## 2019-04-15 NOTE — TELEPHONE ENCOUNTER
Pt seen and evaluated by Adelita Fong CNP with ID. Discussed with Maurice Ng via phone. Pt's urine culture results likely secondary to colonization. ID is recommending as long as pt is asymptomatic (no fever, chills, dysuria, gross hematuria, pain) proceeding with scheduling of surgery. Patient will likely not have negative urine culture prior to surgery with known colonization. Plan for patient to receive fosfomycin 3-4 days prior to surgery and Gentamicin for preoperative coverage.

## 2019-04-15 NOTE — LETTER
[address]  [address]  [address] [facility], [address]  Phone: [phone]  Fax: [phone]    April 15, 2019    Yoly [address]  [address] [address]      Dear Cora Carrasco,    This letter is regarding your Emergency Department (ED) visit at [address] [address] on 4/12/19. Filemon Braden wanted to make sure that you understand your discharge instructions and that you were able to fill any prescriptions that may have been ordered for you. Please contact the office at the above phone number if the ED advised to you follow up with Filemon Braden, or if you have any further questions or needs. Also did you know -   *Visiting the ED for a non-emergency could result in higher co-pays than you would normally be subject to paying? *You can call your doctor even after hours so they can direct you to the most appropriate care. [facility]) practices can often offer you an appointment on the same day that you call. *We have some [facility] offices that offer Walk-in appointments; check our website for availability in your community, www. [employer]      *Evisits are now available for patients for $36 through [employer] for certain conditions:  * Sinus, cold and or cough       * Diarrhea            * Headache  * Heartburn                                * Poison Gely          * Back pain     * Urinary problems                         If you do not have [employer] and are interested, please contact the office and a staff member may assist you or go to [email or web address].     Sincerely,   Jalil Saldivar MD and your [facility]

## 2019-04-15 NOTE — PROGRESS NOTES
[facility] Infectious Disease         Consult Note      Ben Mendez. MEDICAL RECORD NUMBER:  [id number]  AGE: 52 y.o. GENDER: male  : 1970  EPISODE DATE:  4/15/2019    Subjective:     Chief Complaint   Patient presents with    Consultation     Recurrent UTI         HISTORY of PRESENT ILLNESS HPI     Ben Isabel is a 52 y.o. male New patient referred by Dr. Nicole Rojas, who presents today for infectious disease evaluation. History of Infectious Disease Context: Patient has a history of recurrent UTI's and drug resistant bacteria in the urine. He currently has a failed artifical urinary sphincter which needs to be replaced making him incontinent, however his procedure has been postponed twice due to positive culture. He has had multiple different bacteria in his urine in the past. Urine culture from 19 was positive for Staphylococcus epidermidis and Pseudomonas aeruginosa <10,000 CFU/ml, treated with Bactrim and Levaquin which had to be discontinued after 3-4 days due to dizziness. He seen in the ER on 19 due to dizziness. Urine culture from 19  As negative, he was given a dose of Fosfomycin.  He used to self catheterize in the past for many years and also has had augmentation of his bladder in the past.   Pain Timing/Severity: none  Quality of pain: N/A  Severity:  0 / 10   Modifying Factors: None  Associated Signs/Symptoms: none        PAST MEDICAL HISTORY        Diagnosis Date    Arthritis     Chondromatosis     lower spine tumor    Depression     GERD (gastroesophageal reflux disease)     Hypertension     Insomnia     Lower paraplegia (HCC)     ankles down    Meningitis spinal     at 8years old    PONV (postoperative nausea and vomiting)        PAST SURGICAL HISTORY    Past Surgical History:   Procedure Laterality Date    ABDOMEN SURGERY      BACK SURGERY      BLADDER SURGERY      sphincter augmentation   ARTIFICIAL SPHINCTER MRI SAFE 1.5T    bilaterally  Pulmonary/Chest: clear to auscultation bilaterally- no wheezes, rales or rhonchi, normal air movement, no respiratory distress  Cardiovascular: normal rate and regular rhythm  Abdomen: soft, non-tender and bowel sounds normal; colostomy to LLQ red and moist, covered with gauze  Extremities: no cyanosis and no clubbing  Musculoskeletal: normal range of motion of upper extremities, paralysis of lower extremities; denies flank pain  Neurologic: speech normal and gait abnormal- wheelchair bound    LABS       CBC:   Lab Results   Component Value Date    WBC 9.1 04/11/2019    HGB 12.0 04/11/2019    HCT 38.0 04/11/2019    MCV 81.5 04/11/2019     04/11/2019     BMP:   Lab Results   Component Value Date     04/11/2019    K 3.6 04/11/2019     04/11/2019    CO2 22 04/11/2019    BUN 12 04/11/2019    CREATININE 0.7 04/11/2019     PT/INR:   Lab Results   Component Value Date    INR 0.91 08/15/2017     Prealbumin: No results found for: PREALBUMIN  Albumin:  Lab Results   Component Value Date    LABALBU 3.7 04/11/2019    LABALBU 3.6 08/22/2011     Sed Rate:  Lab Results   Component Value Date    SEDRATE 31 11/14/2015     CRP:   Lab Results   Component Value Date    CRP 6.65 11/14/2015     Micro:   Lab Results   Component Value Date    BC No growth-preliminary  No growth   09/09/2017      Hemoglobin A1C: No results found for: LABA1C    Assessment:     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 2XXA    Acute cystitis without hematuria N30.00    Acid-base disorder, mixed E87.4    SIRS due to infectious process with organ dysfunction (HCC) A41.9, R65.20    Bladder spasms N32.89    HTN (hypertension), benign I10    Pain aggravated by activities of daily living R52    Spinal cord tumor with multiple back surgeries D49.7    Impaired mobility and ADLs - due to previous spine tumor./ surgeries Z74.09    Sepsis ([address] A41.9    Nephrolithiasis N20.0    Acute cystitis without hematuria N30.00    Abnormal abdominal CT scan R93.5    Ureteral stone N20.1    Pelvic abscess in male [facility]) K65.1    Colonization with drug-resistant bacteria Z22.39    Recurrent UTI N39.0         Plan:     Patient examined and evaluated. Patient has a history of bacteriuria. He has colonization of his bladder with bacteria due to his previous bladder augmentation, history of frequent UTIs, and previous history of catheterization. Discussed case with Dr. Major Griffith. He will always have bacteria in his urine, should not expect to get a culture to show \"cure\" due to his colonization. As long as patient does not have symptoms of UTI: fever, chills, flank pain, hematuria do not check urine for culture. Ok to proceed with artificial urinary sphincter procedure. Recommend treatment with 1 time dose of Fosfomycin approx 3-4 days prior to surgery and then treatment with IV Gentamycin at time of surgery. Discussed and reviewed case with MARIE Gaspar. Antibiotics: No    Follow up: as needed    Please see attached Discharge Instructions    Written patient dismissal instructions given to patient and signed by patient or POA.              Electronically signed by ASHWIN Linder CNP on 4/15/2019 at 10:32 AM

## 2019-04-16 ENCOUNTER — CARE COORDINATION (OUTPATIENT)
Dept: CASE MANAGEMENT | Age: 49
End: 2019-04-16

## 2019-04-16 NOTE — CARE COORDINATION
Care Transition  ED Follow up Call    Attempted to reach patient for Care Transitions ED follow up. Left message to return call. Contact information provided. FU appts/Provider:    No future appointments.     Vimal Byers RN, BSN  [phone]

## 2019-04-19 ENCOUNTER — CARE COORDINATION (OUTPATIENT)
Dept: CASE MANAGEMENT | Age: 49
End: 2019-04-19

## 2019-04-19 NOTE — CARE COORDINATION
Care Transition  ED Follow up Call    Reason for ED visit:  Dizziness, fatigue, HTN   How are you feeling? \"Normal\"  Status:     significantly improved      Do you have any questions related to your discharge instructions? no    Review of Instructions:    Discharged with new prescription? yes - Norvasc, Antivert - did not fill     Review Medications:  Yes   Do you have any questions related to your medications? no    Understands what to report/when to return?:  Yes   Do you have a follow up appointment scheduled? Yes  Specific review if indicated (symptom, services, etc): Spoke with Sabrina Mcdaniels, introduced self/role. Reports BP has been back to normal since at home, checking a couple times a day and ranges from 120-130/70-80. Advised to hold off on taking Norvasc, continue to monitor BP at home, and notify PCP of any abnormal values, verbalized understanding. Understands values to report. Reports he has also not started antivert, thinks it was related to antibiotics, stopped taking and symptoms subsided. Had ID follow up, no need for antibiotic coverage at this time. Patient denies any UTI symptoms at this time. Awaiting call back from Urology to schedule procedure. Advised patient to call on Monday, verbalized understanding. Denies further needs/assistance/concerns/questions at this time. Do you have any needs or concerns I can assist you with? no     FU appts/Provider:    No future appointments.     Mychal Archuleta RN, BSN  [phone]

## 2019-04-23 ENCOUNTER — TELEPHONE (OUTPATIENT)
Dept: UROLOGY | Age: 49
End: 2019-04-23

## 2019-04-23 NOTE — TELEPHONE ENCOUNTER
Confirmed with Maureen Borrero the artificial sphincter placement with  [facility] as 5/9/19. Confirmed with rep David Gudino. Maureen Borrero was advised he will be notified of the arrival time at a later date. He voiced understanding. Maureen Borrero does not have a prescription from [facility]. Please send the Fosfomycin to [address] [place] [address] on [facility].

## 2019-04-24 ENCOUNTER — TELEPHONE (OUTPATIENT)
Dept: UROLOGY | Age: 49
End: 2019-04-24

## 2019-04-24 RX ORDER — GRANULES FOR ORAL 3 G/1
3 POWDER ORAL ONCE
Qty: 1 EACH | Refills: 0 | Status: SHIPPED | OUTPATIENT
Start: 2019-05-05 | End: 2019-05-05

## 2019-04-24 NOTE — TELEPHONE ENCOUNTER
SURGERY 8[address]  [address] [place] [address] [address] BRIANA ALEXANDER AM OFFENEGG II.REA, [address]      Phone *[phone] *[phone]   Surgical Scheduling Direct Line Phone *[phone] Fax *Deneen Mccloud. 1970 male    [address] [facility] [address]  Marital Status:           Home Phone: [phone]      Cell Phone:    Telephone Information:   Mobile [phone]          Surgeon: Dr. Coleen Romero  Surgery Date: 5/9/2019  Time: TF [place]    Procedure: Revision of artifical urinary sphincter using a double cuff    Diagnosis: Urinary incontinence, bladder spasms, lower paraplegia    Important Medical History: Lower paraplegia, wheelchair    Special Inst/Equip: Regular Room, [facility] [employer] notified [phone]    CPT Codes:    08897  Latex Allergy:  No     Cardiac Device:  No     Anesthesia:  Anesthesiologist (General/Spinal)          Admission Type:  Same Day /OBS             Admit Prior to Day of Surgery: No    Case Location:  Main OR           Preadmission Testing:Phone Call              PAT Date and Time:______________________________________________________    PAT Confirmation #: ______________________________________________________    Post Op Visit: ___________________________________________________________    Need Preop Cardiac Clearance: No    Does Patient have Cardiologist/physician?     none    Surgery Confirmation #: __________________________________________________    : ________________________   Date: __________________________     [employer] Name: Marcia, [place] [employer]

## 2019-04-25 ENCOUNTER — TELEPHONE (OUTPATIENT)
Dept: UROLOGY | Age: 49
End: 2019-04-25

## 2019-04-25 NOTE — TELEPHONE ENCOUNTER
Surgery instructions mailed to pt. Included are the instructions for the prescription of Fosfomycin to be taken on 5/5/2019.

## 2019-04-25 NOTE — TELEPHONE ENCOUNTER
DO NOT TAKE ASPIRIN, PLAVIX, FISH OIL, GLUCOSAMINE CHONDROITIN, MULTIVITAMINS, VITAMIN A, VITAMIN E, VITAMIN B, COUMADIN, OR MOTRIN-LIKE DRUGS 7 DAYS PRIOR TO SURGERY AND 3 DAYS FOLLOWING     Diaz Picket. 1970    Diagnosis: Urinary incontinence, bladder spasms    Surgical Physician: Dr. Mai Gardner have been scheduled for the procedure marked below:      Surgery: Revision of artificial urinary sphincter using double cuff           Date: 5/9/2019     Anesthesia: Anesthesiologist (General/Spinal)     Place of Service: [address] ARRIVAL TIME WILL BE DETERMINED BY DR. Shawn Urbano AT [address] [place] [address]. YOU WILL BE CONTACTED WITH THIS INFORMATION. INSTRUCTIONS AS MARKED BELOW:    1. Take the prescription Fosfomycin Tromethamine (Monurol) 3 g pack on 5/5/2019. 2.DO NOT eat or drink anything after midnight before surgery. 3. We prefer you shower or bathe with an antibacterial soap (Dial) the morning of surgery. 4.  Please ensure to have a  with you to transport you home. 5.  Please bring a current medication list, photo ID and insurance card(s) with you  6. Okay to take Tylenol  7. If you take Glucophage or Metformin, hold 48-hours prior to surgery  8. Take blood pressure medication as directed, if taken in the morning take with a small sip of water  9. PLEASE BRING THIS LETTER WITH YOU AND SHOW IT TO THE  AT [place] [address]. 10.  Does patient have a Pace Maker? no  11. Please send a copy to the Family Dr: Gulshan Hassan MD  12. Your follow up appointment is scheduled for   May 20, 2019   At   9:15am   With  Dr JOCELINE Conroy    Date: 4/25/2019

## 2019-04-30 ENCOUNTER — SURG/PROC ORDERS (OUTPATIENT)
Dept: UROLOGY | Age: 49
End: 2019-04-30

## 2019-04-30 RX ORDER — SODIUM CHLORIDE 9 MG/ML
INJECTION, SOLUTION INTRAVENOUS CONTINUOUS
Status: CANCELLED | OUTPATIENT
Start: 2019-05-09

## 2019-05-02 NOTE — PROGRESS NOTES
PAT call attempted patient unavailable left message with instructions    NPO after midnight  Bring insurance info and drivers license  Wear comfortable clean clothing  Do not bring jewelry   Shower night before and morning of surgery with a liquid antibacterial soap  Bring medications in original bottles  Follow all instructions give by your physician   needed at discharge  Call [phone] for any questions

## 2019-05-05 NOTE — H&P
8 hours as needed for Pain .        gabapentin (NEURONTIN) 800 MG tablet Take 800 mg by mouth 3 times daily        fentaNYL (DURAGESIC) 25 MCG/HR Place 1 patch onto the skin every 72 hours .          No current facility-administered medications on file prior to visit.                Allergies   Allergen Reactions    Asa Buff (Mag [Buffered Aspirin]         Tongue swelling    Prednisone         Insomnia, confusion, restless,         Family History         Family History   Problem Relation Age of Onset    Diabetes Mother      High Blood Pressure Mother      Kidney Disease Mother      Cancer Neg Hx      Stroke Neg Hx      Heart Disease Neg Hx              Social History               Socioeconomic History    Marital status:        Spouse name: Not on file    Number of children: Not on file    Years of education: Not on file    Highest education level: Not on file   Occupational History    Not on file   Social Needs    Financial resource strain: Not on file    Food insecurity:       Worry: Not on file       Inability: Not on file    Transportation needs:       Medical: Not on file       Non-medical: Not on file   Tobacco Use    Smoking status: Former Smoker       Packs/day: 1.00       Last attempt to quit: 2013       Years since quittin.6    Smokeless tobacco: Never Used    Tobacco comment: using vapor no nicotine   Substance and Sexual Activity    Alcohol use:  Yes       Comment: rarely    Drug use: No    Sexual activity: Not Currently   Lifestyle    Physical activity:       Days per week: Not on file       Minutes per session: Not on file    Stress: Not on file   Relationships    Social connections:       Talks on phone: Not on file       Gets together: Not on file       Attends Zoroastrianism service: Not on file       Active member of club or organization: Not on file       Attends meetings of clubs or organizations: Not on file       Relationship status: Not on file    Intimate to be normal.  There were no tumors, stones, ulcers or foreign bodies. There were no mucosal abnormalities. The ureteral orifices were seen and were normal.  The scope was removed. The patient tolerated the procedure and there were no complications. A dose of 500 mg ciprofloxacin was given for the procedure.     Impression: normal cystoscopy  No scar tissue, no erosion            Plan:     Schedule with artifical sphincter surgery. Urine culture sent today. Needs clean culture prior to surgery      I described the procedure in detail and also described the associated risks and benefits at length. We discussed possible alternative therapies. We discussed the risks and benefits of not undergoing therapy. Burgess Thomas understands these risks and benefits and desires to proceed. He will be scheduled for the procedure in the very near future.

## 2019-05-07 ENCOUNTER — TELEPHONE (OUTPATIENT)
Dept: UROLOGY | Age: 49
End: 2019-05-07

## 2019-05-07 NOTE — TELEPHONE ENCOUNTER
Patient notified of surgery arrival time. Patient is to be at [address] [place] [address] Same day surgery by  2:00 pm   on  5/9/19. Patient reminded to have nothing to eat or drink after midnight. Patient voiced understanding.

## 2019-05-09 ENCOUNTER — ANESTHESIA EVENT (OUTPATIENT)
Dept: OPERATING ROOM | Age: 49
DRG: 671 | End: 2019-05-09
Payer: MEDICARE

## 2019-05-09 ENCOUNTER — ANESTHESIA (OUTPATIENT)
Dept: OPERATING ROOM | Age: 49
DRG: 671 | End: 2019-05-09
Payer: MEDICARE

## 2019-05-09 ENCOUNTER — HOSPITAL ENCOUNTER (INPATIENT)
Age: 49
LOS: 1 days | Discharge: HOME | DRG: 671 | End: 2019-05-10
Attending: UROLOGY | Admitting: UROLOGY
Payer: MEDICARE

## 2019-05-09 VITALS
OXYGEN SATURATION: 100 % | RESPIRATION RATE: 2 BRPM | SYSTOLIC BLOOD PRESSURE: 186 MMHG | TEMPERATURE: 99.1 F | DIASTOLIC BLOOD PRESSURE: 92 MMHG

## 2019-05-09 DIAGNOSIS — R52 PAIN AGGRAVATED BY ACTIVITIES OF DAILY LIVING: Primary | ICD-10-CM

## 2019-05-09 PROBLEM — G89.18 POST PROCEDURE DISCOMFORT: Status: ACTIVE | Noted: 2019-05-09

## 2019-05-09 PROCEDURE — 2580000003 HC RX 258: Performed by: UROLOGY

## 2019-05-09 PROCEDURE — 3700000000 HC ANESTHESIA ATTENDED CARE: Performed by: UROLOGY

## 2019-05-09 PROCEDURE — 6360000002 HC RX W HCPCS

## 2019-05-09 PROCEDURE — 2709999900 HC NON-CHARGEABLE SUPPLY: Performed by: UROLOGY

## 2019-05-09 PROCEDURE — 2580000003 HC RX 258

## 2019-05-09 PROCEDURE — 2500000003 HC RX 250 WO HCPCS: Performed by: UROLOGY

## 2019-05-09 PROCEDURE — 2720000010 HC SURG SUPPLY STERILE: Performed by: UROLOGY

## 2019-05-09 PROCEDURE — 7100000000 HC PACU RECOVERY - FIRST 15 MIN: Performed by: UROLOGY

## 2019-05-09 PROCEDURE — 2709999900 HC NON-CHARGEABLE SUPPLY

## 2019-05-09 PROCEDURE — 53447 REMOVE/REPLACE UR SPHINCTER: CPT | Performed by: UROLOGY

## 2019-05-09 PROCEDURE — 3600000003 HC SURGERY LEVEL 3 BASE: Performed by: UROLOGY

## 2019-05-09 PROCEDURE — 7100000001 HC PACU RECOVERY - ADDTL 15 MIN: Performed by: UROLOGY

## 2019-05-09 PROCEDURE — 0TRD0JZ REPLACEMENT OF URETHRA WITH SYNTHETIC SUBSTITUTE, OPEN APPROACH: ICD-10-PCS | Performed by: UROLOGY

## 2019-05-09 PROCEDURE — G0378 HOSPITAL OBSERVATION PER HR: HCPCS

## 2019-05-09 PROCEDURE — 1200000000 HC SEMI PRIVATE

## 2019-05-09 PROCEDURE — 3700000001 HC ADD 15 MINUTES (ANESTHESIA): Performed by: UROLOGY

## 2019-05-09 PROCEDURE — 3600000013 HC SURGERY LEVEL 3 ADDTL 15MIN: Performed by: UROLOGY

## 2019-05-09 PROCEDURE — 99999 PR OFFICE/OUTPT VISIT,PROCEDURE ONLY: CPT | Performed by: UROLOGY

## 2019-05-09 PROCEDURE — 6360000002 HC RX W HCPCS: Performed by: ANESTHESIOLOGY

## 2019-05-09 PROCEDURE — 6360000002 HC RX W HCPCS: Performed by: UROLOGY

## 2019-05-09 PROCEDURE — 2780000010 HC IMPLANT OTHER: Performed by: UROLOGY

## 2019-05-09 PROCEDURE — 2500000003 HC RX 250 WO HCPCS: Performed by: NURSE ANESTHETIST, CERTIFIED REGISTERED

## 2019-05-09 PROCEDURE — 6360000002 HC RX W HCPCS: Performed by: NURSE ANESTHETIST, CERTIFIED REGISTERED

## 2019-05-09 PROCEDURE — 6360000002 HC RX W HCPCS: Performed by: NURSE PRACTITIONER

## 2019-05-09 PROCEDURE — C1815 PROS, URINARY SPH, IMP: HCPCS | Performed by: UROLOGY

## 2019-05-09 DEVICE — CONTROL PUMP WITH INHIBIZONE
Type: IMPLANTABLE DEVICE | Site: SCROTUM | Status: FUNCTIONAL
Brand: AMS 800 ARTIFICIAL URINARY SPHINCTER

## 2019-05-09 RX ORDER — ONDANSETRON 2 MG/ML
4 INJECTION INTRAMUSCULAR; INTRAVENOUS EVERY 4 HOURS PRN
Status: DISCONTINUED | OUTPATIENT
Start: 2019-05-09 | End: 2019-05-09 | Stop reason: DRUGHIGH

## 2019-05-09 RX ORDER — GLYCOPYRROLATE 1 MG/5 ML
SYRINGE (ML) INTRAVENOUS PRN
Status: DISCONTINUED | OUTPATIENT
Start: 2019-05-09 | End: 2019-05-09 | Stop reason: SDUPTHER

## 2019-05-09 RX ORDER — SODIUM CHLORIDE 0.9 % (FLUSH) 0.9 %
10 SYRINGE (ML) INJECTION PRN
Status: DISCONTINUED | OUTPATIENT
Start: 2019-05-09 | End: 2019-05-10 | Stop reason: HOSPADM

## 2019-05-09 RX ORDER — FENTANYL CITRATE 50 UG/ML
50 INJECTION, SOLUTION INTRAMUSCULAR; INTRAVENOUS EVERY 5 MIN PRN
Status: DISCONTINUED | OUTPATIENT
Start: 2019-05-09 | End: 2019-05-09 | Stop reason: HOSPADM

## 2019-05-09 RX ORDER — GENTAMICIN SULFATE 80 MG/100ML
80 INJECTION, SOLUTION INTRAVENOUS
Status: COMPLETED | OUTPATIENT
Start: 2019-05-09 | End: 2019-05-09

## 2019-05-09 RX ORDER — ROCURONIUM BROMIDE 10 MG/ML
INJECTION, SOLUTION INTRAVENOUS PRN
Status: DISCONTINUED | OUTPATIENT
Start: 2019-05-09 | End: 2019-05-09 | Stop reason: SDUPTHER

## 2019-05-09 RX ORDER — SODIUM CHLORIDE 9 MG/ML
INJECTION, SOLUTION INTRAVENOUS CONTINUOUS
Status: DISCONTINUED | OUTPATIENT
Start: 2019-05-09 | End: 2019-05-10 | Stop reason: HOSPADM

## 2019-05-09 RX ORDER — KETOROLAC TROMETHAMINE 30 MG/ML
INJECTION, SOLUTION INTRAMUSCULAR; INTRAVENOUS PRN
Status: DISCONTINUED | OUTPATIENT
Start: 2019-05-09 | End: 2019-05-09 | Stop reason: SDUPTHER

## 2019-05-09 RX ORDER — LIDOCAINE HYDROCHLORIDE 20 MG/ML
INJECTION, SOLUTION INFILTRATION; PERINEURAL PRN
Status: DISCONTINUED | OUTPATIENT
Start: 2019-05-09 | End: 2019-05-09 | Stop reason: SDUPTHER

## 2019-05-09 RX ORDER — SENNOSIDES 8.6 MG
1 TABLET ORAL DAILY
Status: DISCONTINUED | OUTPATIENT
Start: 2019-05-10 | End: 2019-05-10 | Stop reason: HOSPADM

## 2019-05-09 RX ORDER — MORPHINE SULFATE 2 MG/ML
2 INJECTION, SOLUTION INTRAMUSCULAR; INTRAVENOUS
Status: DISCONTINUED | OUTPATIENT
Start: 2019-05-09 | End: 2019-05-10 | Stop reason: HOSPADM

## 2019-05-09 RX ORDER — DEXAMETHASONE SODIUM PHOSPHATE 4 MG/ML
INJECTION, SOLUTION INTRA-ARTICULAR; INTRALESIONAL; INTRAMUSCULAR; INTRAVENOUS; SOFT TISSUE PRN
Status: DISCONTINUED | OUTPATIENT
Start: 2019-05-09 | End: 2019-05-09 | Stop reason: SDUPTHER

## 2019-05-09 RX ORDER — FENTANYL CITRATE 50 UG/ML
INJECTION, SOLUTION INTRAMUSCULAR; INTRAVENOUS PRN
Status: DISCONTINUED | OUTPATIENT
Start: 2019-05-09 | End: 2019-05-09 | Stop reason: SDUPTHER

## 2019-05-09 RX ORDER — MIDAZOLAM HYDROCHLORIDE 1 MG/ML
INJECTION INTRAMUSCULAR; INTRAVENOUS PRN
Status: DISCONTINUED | OUTPATIENT
Start: 2019-05-09 | End: 2019-05-09 | Stop reason: SDUPTHER

## 2019-05-09 RX ORDER — PROMETHAZINE HYDROCHLORIDE 25 MG/ML
INJECTION, SOLUTION INTRAMUSCULAR; INTRAVENOUS
Status: COMPLETED
Start: 2019-05-09 | End: 2019-05-09

## 2019-05-09 RX ORDER — NEOSTIGMINE METHYLSULFATE 5 MG/5 ML
SYRINGE (ML) INTRAVENOUS PRN
Status: DISCONTINUED | OUTPATIENT
Start: 2019-05-09 | End: 2019-05-09 | Stop reason: SDUPTHER

## 2019-05-09 RX ORDER — VANCOMYCIN HYDROCHLORIDE 1 G/20ML
INJECTION, POWDER, LYOPHILIZED, FOR SOLUTION INTRAVENOUS PRN
Status: DISCONTINUED | OUTPATIENT
Start: 2019-05-09 | End: 2019-05-09 | Stop reason: SDUPTHER

## 2019-05-09 RX ORDER — LEVOFLOXACIN 5 MG/ML
500 INJECTION, SOLUTION INTRAVENOUS EVERY 24 HOURS
Status: DISCONTINUED | OUTPATIENT
Start: 2019-05-09 | End: 2019-05-10 | Stop reason: HOSPADM

## 2019-05-09 RX ORDER — PANTOPRAZOLE SODIUM 40 MG/1
40 TABLET, DELAYED RELEASE ORAL
Status: DISCONTINUED | OUTPATIENT
Start: 2019-05-10 | End: 2019-05-10 | Stop reason: HOSPADM

## 2019-05-09 RX ORDER — SODIUM CHLORIDE 0.9 % (FLUSH) 0.9 %
10 SYRINGE (ML) INJECTION EVERY 12 HOURS SCHEDULED
Status: DISCONTINUED | OUTPATIENT
Start: 2019-05-09 | End: 2019-05-10 | Stop reason: HOSPADM

## 2019-05-09 RX ORDER — LABETALOL HYDROCHLORIDE 5 MG/ML
10 INJECTION, SOLUTION INTRAVENOUS EVERY 10 MIN PRN
Status: DISCONTINUED | OUTPATIENT
Start: 2019-05-09 | End: 2019-05-09 | Stop reason: HOSPADM

## 2019-05-09 RX ORDER — ACETAMINOPHEN 325 MG/1
650 TABLET ORAL EVERY 4 HOURS PRN
Status: DISCONTINUED | OUTPATIENT
Start: 2019-05-09 | End: 2019-05-10 | Stop reason: HOSPADM

## 2019-05-09 RX ORDER — HYDROCODONE BITARTRATE AND ACETAMINOPHEN 5; 325 MG/1; MG/1
2 TABLET ORAL EVERY 4 HOURS PRN
Status: DISCONTINUED | OUTPATIENT
Start: 2019-05-09 | End: 2019-05-09

## 2019-05-09 RX ORDER — MORPHINE SULFATE 2 MG/ML
4 INJECTION, SOLUTION INTRAMUSCULAR; INTRAVENOUS
Status: DISCONTINUED | OUTPATIENT
Start: 2019-05-09 | End: 2019-05-10 | Stop reason: HOSPADM

## 2019-05-09 RX ORDER — OXYCODONE AND ACETAMINOPHEN 10; 325 MG/1; MG/1
1 TABLET ORAL EVERY 8 HOURS PRN
Status: DISCONTINUED | OUTPATIENT
Start: 2019-05-09 | End: 2019-05-10 | Stop reason: HOSPADM

## 2019-05-09 RX ORDER — GENTAMICIN SULFATE 80 MG/100ML
80 INJECTION, SOLUTION INTRAVENOUS EVERY 8 HOURS
Status: DISCONTINUED | OUTPATIENT
Start: 2019-05-10 | End: 2019-05-10 | Stop reason: HOSPADM

## 2019-05-09 RX ORDER — GABAPENTIN 400 MG/1
800 CAPSULE ORAL 3 TIMES DAILY
Status: DISCONTINUED | OUTPATIENT
Start: 2019-05-09 | End: 2019-05-10 | Stop reason: HOSPADM

## 2019-05-09 RX ORDER — ONDANSETRON 2 MG/ML
INJECTION INTRAMUSCULAR; INTRAVENOUS PRN
Status: DISCONTINUED | OUTPATIENT
Start: 2019-05-09 | End: 2019-05-09 | Stop reason: SDUPTHER

## 2019-05-09 RX ORDER — SODIUM CHLORIDE 9 MG/ML
INJECTION, SOLUTION INTRAVENOUS CONTINUOUS
Status: DISCONTINUED | OUTPATIENT
Start: 2019-05-09 | End: 2019-05-09

## 2019-05-09 RX ORDER — AMLODIPINE BESYLATE 10 MG/1
10 TABLET ORAL DAILY
Status: DISCONTINUED | OUTPATIENT
Start: 2019-05-10 | End: 2019-05-10 | Stop reason: HOSPADM

## 2019-05-09 RX ORDER — HYDROCODONE BITARTRATE AND ACETAMINOPHEN 5; 325 MG/1; MG/1
1 TABLET ORAL EVERY 4 HOURS PRN
Status: DISCONTINUED | OUTPATIENT
Start: 2019-05-09 | End: 2019-05-09

## 2019-05-09 RX ORDER — PROPOFOL 10 MG/ML
INJECTION, EMULSION INTRAVENOUS PRN
Status: DISCONTINUED | OUTPATIENT
Start: 2019-05-09 | End: 2019-05-09 | Stop reason: SDUPTHER

## 2019-05-09 RX ORDER — ONDANSETRON 2 MG/ML
4 INJECTION INTRAMUSCULAR; INTRAVENOUS EVERY 6 HOURS PRN
Status: DISCONTINUED | OUTPATIENT
Start: 2019-05-09 | End: 2019-05-10 | Stop reason: HOSPADM

## 2019-05-09 RX ORDER — MORPHINE SULFATE 2 MG/ML
2 INJECTION, SOLUTION INTRAMUSCULAR; INTRAVENOUS EVERY 5 MIN PRN
Status: DISCONTINUED | OUTPATIENT
Start: 2019-05-09 | End: 2019-05-09 | Stop reason: HOSPADM

## 2019-05-09 RX ORDER — AMITRIPTYLINE HYDROCHLORIDE 75 MG/1
75 TABLET, FILM COATED ORAL NIGHTLY
Status: DISCONTINUED | OUTPATIENT
Start: 2019-05-09 | End: 2019-05-10 | Stop reason: HOSPADM

## 2019-05-09 RX ORDER — DOCUSATE SODIUM 100 MG/1
100 CAPSULE, LIQUID FILLED ORAL 2 TIMES DAILY
Status: DISCONTINUED | OUTPATIENT
Start: 2019-05-09 | End: 2019-05-10 | Stop reason: HOSPADM

## 2019-05-09 RX ADMIN — ONDANSETRON HYDROCHLORIDE 4 MG: 4 INJECTION, SOLUTION INTRAMUSCULAR; INTRAVENOUS at 20:40

## 2019-05-09 RX ADMIN — DEXAMETHASONE SODIUM PHOSPHATE 10 MG: 4 INJECTION, SOLUTION INTRAMUSCULAR; INTRAVENOUS at 18:49

## 2019-05-09 RX ADMIN — FENTANYL CITRATE 50 MCG: 50 INJECTION INTRAMUSCULAR; INTRAVENOUS at 20:18

## 2019-05-09 RX ADMIN — MORPHINE SULFATE 4 MG: 2 INJECTION, SOLUTION INTRAMUSCULAR; INTRAVENOUS at 22:25

## 2019-05-09 RX ADMIN — Medication 0.6 MG: at 21:02

## 2019-05-09 RX ADMIN — PROPOFOL 150 MG: 10 INJECTION, EMULSION INTRAVENOUS at 18:41

## 2019-05-09 RX ADMIN — SODIUM CHLORIDE: 9 INJECTION, SOLUTION INTRAVENOUS at 19:41

## 2019-05-09 RX ADMIN — GENTAMICIN SULFATE 80 MG: 80 INJECTION, SOLUTION INTRAVENOUS at 18:55

## 2019-05-09 RX ADMIN — PROPOFOL 30 MG: 10 INJECTION, EMULSION INTRAVENOUS at 20:37

## 2019-05-09 RX ADMIN — FENTANYL CITRATE 50 MCG: 50 INJECTION, SOLUTION INTRAMUSCULAR; INTRAVENOUS at 21:50

## 2019-05-09 RX ADMIN — VANCOMYCIN HYDROCHLORIDE 1000 MG: 1 INJECTION, POWDER, LYOPHILIZED, FOR SOLUTION INTRAVENOUS at 19:16

## 2019-05-09 RX ADMIN — PHENYLEPHRINE HYDROCHLORIDE 100 MCG: 10 INJECTION INTRAVENOUS at 19:51

## 2019-05-09 RX ADMIN — ROCURONIUM BROMIDE 30 MG: 10 INJECTION INTRAVENOUS at 18:42

## 2019-05-09 RX ADMIN — KETOROLAC TROMETHAMINE 30 MG: 30 INJECTION, SOLUTION INTRAMUSCULAR; INTRAVENOUS at 20:39

## 2019-05-09 RX ADMIN — MIDAZOLAM HYDROCHLORIDE 2 MG: 1 INJECTION, SOLUTION INTRAMUSCULAR; INTRAVENOUS at 18:36

## 2019-05-09 RX ADMIN — Medication 3 MG: at 21:02

## 2019-05-09 RX ADMIN — SODIUM CHLORIDE: 9 INJECTION, SOLUTION INTRAVENOUS at 14:53

## 2019-05-09 RX ADMIN — FENTANYL CITRATE 50 MCG: 50 INJECTION INTRAMUSCULAR; INTRAVENOUS at 20:36

## 2019-05-09 RX ADMIN — LIDOCAINE HYDROCHLORIDE 50 MG: 20 INJECTION, SOLUTION INFILTRATION; PERINEURAL at 18:41

## 2019-05-09 RX ADMIN — FENTANYL CITRATE 50 MCG: 50 INJECTION INTRAMUSCULAR; INTRAVENOUS at 18:36

## 2019-05-09 RX ADMIN — PHENYLEPHRINE HYDROCHLORIDE 100 MCG: 10 INJECTION INTRAVENOUS at 19:16

## 2019-05-09 RX ADMIN — FENTANYL CITRATE 50 MCG: 50 INJECTION, SOLUTION INTRAMUSCULAR; INTRAVENOUS at 21:40

## 2019-05-09 RX ADMIN — PROMETHAZINE HYDROCHLORIDE 25 MG: 25 INJECTION INTRAMUSCULAR; INTRAVENOUS at 21:20

## 2019-05-09 RX ADMIN — FENTANYL CITRATE 50 MCG: 50 INJECTION INTRAMUSCULAR; INTRAVENOUS at 19:48

## 2019-05-09 ASSESSMENT — PULMONARY FUNCTION TESTS
PIF_VALUE: 15
PIF_VALUE: 15
PIF_VALUE: 14
PIF_VALUE: 15
PIF_VALUE: 15
PIF_VALUE: 18
PIF_VALUE: 16
PIF_VALUE: 12
PIF_VALUE: 15
PIF_VALUE: 14
PIF_VALUE: 15
PIF_VALUE: 16
PIF_VALUE: 26
PIF_VALUE: 15
PIF_VALUE: 13
PIF_VALUE: 15
PIF_VALUE: 16
PIF_VALUE: 26
PIF_VALUE: 15
PIF_VALUE: 18
PIF_VALUE: 15
PIF_VALUE: 15
PIF_VALUE: 12
PIF_VALUE: 14
PIF_VALUE: 14
PIF_VALUE: 15
PIF_VALUE: 11
PIF_VALUE: 15
PIF_VALUE: 14
PIF_VALUE: 16
PIF_VALUE: 16
PIF_VALUE: 14
PIF_VALUE: 15
PIF_VALUE: 15
PIF_VALUE: 16
PIF_VALUE: 15
PIF_VALUE: 16
PIF_VALUE: 14
PIF_VALUE: 16
PIF_VALUE: 15
PIF_VALUE: 16
PIF_VALUE: 16
PIF_VALUE: 15
PIF_VALUE: 14
PIF_VALUE: 16
PIF_VALUE: 15
PIF_VALUE: 14
PIF_VALUE: 15
PIF_VALUE: 26
PIF_VALUE: 15
PIF_VALUE: 14
PIF_VALUE: 15
PIF_VALUE: 26
PIF_VALUE: 15
PIF_VALUE: 14
PIF_VALUE: 15
PIF_VALUE: 14
PIF_VALUE: 15
PIF_VALUE: 12
PIF_VALUE: 16
PIF_VALUE: 15
PIF_VALUE: 16
PIF_VALUE: 15
PIF_VALUE: 11
PIF_VALUE: 11
PIF_VALUE: 15
PIF_VALUE: 15
PIF_VALUE: 16
PIF_VALUE: 16
PIF_VALUE: 15
PIF_VALUE: 15
PIF_VALUE: 16
PIF_VALUE: 11
PIF_VALUE: 14
PIF_VALUE: 0
PIF_VALUE: 15
PIF_VALUE: 10
PIF_VALUE: 15
PIF_VALUE: 14
PIF_VALUE: 14
PIF_VALUE: 15
PIF_VALUE: 14
PIF_VALUE: 15
PIF_VALUE: 16
PIF_VALUE: 14
PIF_VALUE: 17
PIF_VALUE: 14
PIF_VALUE: 15
PIF_VALUE: 15
PIF_VALUE: 14
PIF_VALUE: 16
PIF_VALUE: 14
PIF_VALUE: 16
PIF_VALUE: 14
PIF_VALUE: 15
PIF_VALUE: 5
PIF_VALUE: 15
PIF_VALUE: 16
PIF_VALUE: 2
PIF_VALUE: 15
PIF_VALUE: 26
PIF_VALUE: 15
PIF_VALUE: 15
PIF_VALUE: 14
PIF_VALUE: 14
PIF_VALUE: 16

## 2019-05-09 ASSESSMENT — PAIN DESCRIPTION - DESCRIPTORS
DESCRIPTORS: DISCOMFORT;ACHING
DESCRIPTORS: DISCOMFORT

## 2019-05-09 ASSESSMENT — PAIN DESCRIPTION - PAIN TYPE: TYPE: SURGICAL PAIN

## 2019-05-09 ASSESSMENT — PAIN DESCRIPTION - ONSET: ONSET: ON-GOING

## 2019-05-09 ASSESSMENT — PAIN DESCRIPTION - PROGRESSION
CLINICAL_PROGRESSION: NOT CHANGED
CLINICAL_PROGRESSION: NOT CHANGED

## 2019-05-09 ASSESSMENT — PAIN - FUNCTIONAL ASSESSMENT
PAIN_FUNCTIONAL_ASSESSMENT: PREVENTS OR INTERFERES SOME ACTIVE ACTIVITIES AND ADLS
PAIN_FUNCTIONAL_ASSESSMENT: 0-10

## 2019-05-09 ASSESSMENT — PAIN DESCRIPTION - FREQUENCY: FREQUENCY: CONTINUOUS

## 2019-05-09 ASSESSMENT — PAIN SCALES - GENERAL
PAINLEVEL_OUTOF10: 7
PAINLEVEL_OUTOF10: 9
PAINLEVEL_OUTOF10: 10
PAINLEVEL_OUTOF10: 9
PAINLEVEL_OUTOF10: 6

## 2019-05-09 ASSESSMENT — PAIN DESCRIPTION - LOCATION: LOCATION: OTHER (COMMENT)

## 2019-05-09 NOTE — ANESTHESIA PRE PROCEDURE
Department of Anesthesiology  Preprocedure Note       Name:  Cinderella Buerger. Age:  52 y.o.  :  1970                                          MRN:  [id number]         Date:  2019      Surgeon: Samantha Bey):  Alee Banuelos MD    Procedure: REVISION OF ARTIFICAL URINARY SPHINCTER USING A DOUBLE CUFF (N/A )    Medications prior to admission:   Prior to Admission medications    Medication Sig Start Date End Date Taking? Authorizing Provider   Pantoprazole Sodium (PROTONIX PO) Take 40 mg by mouth daily    Yes Historical Provider, MD   oxybutynin (DITROPAN) 5 MG tablet Take 1 tablet by mouth 3 times daily 18  Yes Cheryle Base, MD   Misc. Devices (Facundo Eisenmenger) [address] [facility] 4\" Leanne cushion Diagnosis: G82.20 Lower Paraplegia 18  Yes Cheryle Base, MD   traMADol (ULTRAM) 50 MG tablet Take by mouth every 6 hours as needed for Pain 1-2 tab   Yes Historical Provider, MD   oxyCODONE-acetaminophen (PERCOCET)  MG per tablet Take 1 tablet by mouth every 8 hours as needed for Pain . Yes Historical Provider, MD   gabapentin (NEURONTIN) 800 MG tablet Take 800 mg by mouth 3 times daily   Yes Historical Provider, MD   amLODIPine (NORVASC) 10 MG tablet Take 1 tablet by mouth daily 19   Jose Hurley MD   amitriptyline (ELAVIL) 75 MG tablet Take 75 mg by mouth nightly    Historical Provider, MD   senna (SENOKOT) 8.6 MG TABS tablet Take 1 tablet by mouth daily 4/10/18   Zaria Velezon Counts, APRN - CNP   fentaNYL (DURAGESIC) 25 MCG/HR Place 1 patch onto the skin every 72 hours . Historical Provider, MD       Current medications:    Current Facility-Administered Medications   Medication Dose Route Frequency Provider Last Rate Last Dose    0.9 % sodium chloride infusion   Intravenous Continuous [place]  mL/hr at 58/47/91 1453      gentamicin (GARAMYCIN) IVPB 80 mg  80 mg Intravenous On Call to [address], LPN           Allergies:     Allergies   Allergen Reactions   Britney Iniguez Buff (Mag [Buffered Aspirin]      Tongue swelling    Prednisone      Insomnia, confusion, restless,    Tape Garcia Bear Tape] Rash       Problem List:    Patient Active Problem List   Diagnosis Code    GERD (gastroesophageal reflux disease) K21.9    Lower paraplegia (HCC) G82.20    Chondromatosis D48.0    Shortness of breath R06.02    Fatigue R53.83    Left nephrolithiasis N20.0    Right lower quadrant abdominal pain R10.31    Dizziness R42    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 2XXA    Acute cystitis without hematuria N30.00    Acid-base disorder, mixed E87.4    SIRS due to infectious process with organ dysfunction (HCC) A41.9, R65.20    Bladder spasms N32.89    HTN (hypertension), benign I10    Pain aggravated by activities of daily living R52    Spinal cord tumor with multiple back surgeries D49.7    Impaired mobility and ADLs - due to previous spine tumor./ surgeries Z74.09    Sepsis ([address] A41.9    Nephrolithiasis N20.0    Acute cystitis without hematuria N30.00    Abnormal abdominal CT scan R93.5    Ureteral stone N20.1    Pelvic abscess in male [facility]) K65.1    Colonization with drug-resistant bacteria Z22.39    Recurrent UTI N39.0    Bacteriuria, asymptomatic R82.71       Past Medical History:        Diagnosis Date    Arthritis     Chondromatosis     lower spine tumor    Depression     GERD (gastroesophageal reflux disease)     Hypertension     Insomnia     Lower paraplegia (HCC)     ankles down    Meningitis spinal     at 8years old    PONV (postoperative nausea and vomiting)        Past Surgical History:        Procedure Laterality Date    ABDOMEN SURGERY      BACK SURGERY      BLADDER SURGERY      sphincter augmentation   ARTIFICIAL SPHINCTER MRI SAFE 1.5T    COLONOSCOPY      COLOSTOMY      DILATATION, ESOPHAGUS      ENDOSCOPY, COLON, DIAGNOSTIC      FRACTURE SURGERY      NEPHROLITHOTOMY Left 8/15/2017    LEFT PERCUTANEOUS NEPHROLITHOTRIPSY performed by Link Jordan MD at [address] [place] [address]    skin flap bottom    SPINE SURGERY      Multiple    TENDON RELEASE Bilateral     feet    URETER SURGERY      replaced on left as kid    URETHRA SURGERY  2014    REVISION OF URETHRAL SPHINCTER       Social History:    Social History     Tobacco Use    Smoking status: Former Smoker     Packs/day: 1.00     Last attempt to quit: 2013     Years since quittin.7    Smokeless tobacco: Never Used    Tobacco comment: using vapor no nicotine   Substance Use Topics    Alcohol use: Yes     Comment: rarely                                Counseling given: Not Answered  Comment: using vapor no nicotine      Vital Signs (Current):   Vitals:    19 1403   BP: (!) 163/92   Pulse: 96   Resp: 16   Temp: 99.2 °F (37.3 °C)   TempSrc: Temporal   SpO2: 97%   Weight: 145 lb (65.8 kg)   Height: 5' 7\" (1.702 m)                                              BP Readings from Last 3 Encounters:   19 (!) 163/92   04/15/19 (!) 160/89   19 (!) 160/100       NPO Status: Time of last liquid consumption: 0900(water with meds)                        Time of last solid consumption:                         Date of last liquid consumption: 19                        Date of last solid food consumption: 19    BMI:   Wt Readings from Last 3 Encounters:   19 145 lb (65.8 kg)   04/15/19 145 lb 15.1 oz (66.2 kg)   19 146 lb (66.2 kg)     Body mass index is 22.71 kg/m².     CBC:   Lab Results   Component Value Date    WBC 9.1 2019    RBC 4.66 2019    RBC 3.66 2011    HGB 12.0 2019    HCT 38.0 2019    MCV 81.5 2019    RDW 16.0 04/10/2018     2019       CMP:   Lab Results   Component Value Date     2019    K 3.6 2019     2019    CO2 22 2019    BUN 12 2019    CREATININE 0.7 2019    LABGLOM >90 2019 GLUCOSE 126 04/11/2019    GLUCOSE 83 05/31/2017    PROT 6.9 04/11/2019    CALCIUM 8.9 04/11/2019    BILITOT 0.2 04/11/2019    ALKPHOS 112 04/11/2019    AST 15 04/11/2019    ALT 10 04/11/2019       POC Tests: No results for input(s): POCGLU, POCNA, POCK, POCCL, POCBUN, POCHEMO, POCHCT in the last 72 hours. Coags:   Lab Results   Component Value Date    INR 0.91 08/15/2017       HCG (If Applicable): No results found for: PREGTESTUR, PREGSERUM, HCG, HCGQUANT     ABGs: No results found for: PHART, PO2ART, BOP4EWK, ZWU3DIW, BEART, T5XRPQII     Type & Screen (If Applicable):  No results found for: LABABO, LABRH    Anesthesia Evaluation    Airway: Mallampati: II  TM distance: >3 FB   Neck ROM: full  Mouth opening: > = 3 FB Dental:          Pulmonary: breath sounds clear to auscultation                             Cardiovascular:    (+) hypertension:,         Rhythm: regular                      Neuro/Psych:   (+) psychiatric history:            GI/Hepatic/Renal:   (+) GERD:,           Endo/Other:                     Abdominal:   (+) obese,         Vascular:                                        Anesthesia Plan      general     ASA 3     (History of meningitis , neurological deficit  Weakness below bilateral ankeles)  Induction: intravenous. MIPS: Postoperative opioids intended and Prophylactic antiemetics administered. Anesthetic plan and risks discussed with patient, mother and sibling. Plan discussed with CRNA.                   Eileen Barnes MD   5/9/2019

## 2019-05-10 ENCOUNTER — TELEPHONE (OUTPATIENT)
Dept: UROLOGY | Age: 49
End: 2019-05-10

## 2019-05-10 ENCOUNTER — TELEPHONE (OUTPATIENT)
Dept: FAMILY MEDICINE CLINIC | Age: 49
End: 2019-05-10

## 2019-05-10 VITALS
WEIGHT: 145 LBS | DIASTOLIC BLOOD PRESSURE: 76 MMHG | HEART RATE: 105 BPM | RESPIRATION RATE: 18 BRPM | SYSTOLIC BLOOD PRESSURE: 160 MMHG | BODY MASS INDEX: 22.76 KG/M2 | HEIGHT: 67 IN | TEMPERATURE: 97.2 F | OXYGEN SATURATION: 98 %

## 2019-05-10 LAB
ANION GAP SERPL CALCULATED.3IONS-SCNC: 11 MEQ/L (ref 8–16)
BUN BLDV-MCNC: 12 MG/DL (ref 7–22)
CALCIUM SERPL-MCNC: 8.3 MG/DL (ref 8.5–10.5)
CHLORIDE BLD-SCNC: 109 MEQ/L (ref 98–111)
CO2: 20 MEQ/L (ref 23–33)
CREAT SERPL-MCNC: 0.6 MG/DL (ref 0.4–1.2)
ERYTHROCYTE [DISTWIDTH] IN BLOOD BY AUTOMATED COUNT: 14.5 % (ref 11.5–14.5)
ERYTHROCYTE [DISTWIDTH] IN BLOOD BY AUTOMATED COUNT: 43.8 FL (ref 35–45)
GFR SERPL CREATININE-BSD FRML MDRD: > 90 ML/MIN/1.73M2
GLUCOSE BLD-MCNC: 128 MG/DL (ref 70–108)
HCT VFR BLD CALC: 32.5 % (ref 42–52)
HEMOGLOBIN: 10.2 GM/DL (ref 14–18)
MCH RBC QN AUTO: 26 PG (ref 26–33)
MCHC RBC AUTO-ENTMCNC: 31.4 GM/DL (ref 32.2–35.5)
MCV RBC AUTO: 82.7 FL (ref 80–94)
PLATELET # BLD: 315 THOU/MM3 (ref 130–400)
PMV BLD AUTO: 9.8 FL (ref 9.4–12.4)
POTASSIUM SERPL-SCNC: 3.7 MEQ/L (ref 3.5–5.2)
RBC # BLD: 3.93 MILL/MM3 (ref 4.7–6.1)
SODIUM BLD-SCNC: 140 MEQ/L (ref 135–145)
WBC # BLD: 10.6 THOU/MM3 (ref 4.8–10.8)

## 2019-05-10 PROCEDURE — 96375 TX/PRO/DX INJ NEW DRUG ADDON: CPT

## 2019-05-10 PROCEDURE — 80048 BASIC METABOLIC PNL TOTAL CA: CPT

## 2019-05-10 PROCEDURE — 96376 TX/PRO/DX INJ SAME DRUG ADON: CPT

## 2019-05-10 PROCEDURE — 6370000000 HC RX 637 (ALT 250 FOR IP): Performed by: NURSE PRACTITIONER

## 2019-05-10 PROCEDURE — 99217 PR OBSERVATION CARE DISCHARGE MANAGEMENT: CPT | Performed by: NURSE PRACTITIONER

## 2019-05-10 PROCEDURE — 2709999900 HC NON-CHARGEABLE SUPPLY

## 2019-05-10 PROCEDURE — 2580000003 HC RX 258: Performed by: NURSE PRACTITIONER

## 2019-05-10 PROCEDURE — 96365 THER/PROPH/DIAG IV INF INIT: CPT

## 2019-05-10 PROCEDURE — G0378 HOSPITAL OBSERVATION PER HR: HCPCS

## 2019-05-10 PROCEDURE — 85027 COMPLETE CBC AUTOMATED: CPT

## 2019-05-10 PROCEDURE — 6360000002 HC RX W HCPCS: Performed by: NURSE PRACTITIONER

## 2019-05-10 PROCEDURE — 36415 COLL VENOUS BLD VENIPUNCTURE: CPT

## 2019-05-10 PROCEDURE — 99999 PR OFFICE/OUTPT VISIT,PROCEDURE ONLY: CPT | Performed by: NURSE PRACTITIONER

## 2019-05-10 RX ORDER — HYDROCODONE BITARTRATE AND ACETAMINOPHEN 5; 325 MG/1; MG/1
1 TABLET ORAL EVERY 4 HOURS PRN
Qty: 12 TABLET | Refills: 0 | Status: SHIPPED | OUTPATIENT
Start: 2019-05-10 | End: 2019-05-13

## 2019-05-10 RX ADMIN — LEVOFLOXACIN 500 MG: 5 INJECTION, SOLUTION INTRAVENOUS at 00:01

## 2019-05-10 RX ADMIN — OXYCODONE AND ACETAMINOPHEN 1 TABLET: 10; 325 TABLET ORAL at 13:10

## 2019-05-10 RX ADMIN — GENTAMICIN SULFATE 80 MG: 80 INJECTION, SOLUTION INTRAVENOUS at 12:10

## 2019-05-10 RX ADMIN — GABAPENTIN 800 MG: 400 CAPSULE ORAL at 13:10

## 2019-05-10 RX ADMIN — DOCUSATE SODIUM 100 MG: 100 CAPSULE, LIQUID FILLED ORAL at 09:35

## 2019-05-10 RX ADMIN — OXYCODONE AND ACETAMINOPHEN 1 TABLET: 10; 325 TABLET ORAL at 03:51

## 2019-05-10 RX ADMIN — GABAPENTIN 800 MG: 400 CAPSULE ORAL at 09:34

## 2019-05-10 RX ADMIN — MORPHINE SULFATE 4 MG: 2 INJECTION, SOLUTION INTRAMUSCULAR; INTRAVENOUS at 04:49

## 2019-05-10 RX ADMIN — MORPHINE SULFATE 4 MG: 2 INJECTION, SOLUTION INTRAMUSCULAR; INTRAVENOUS at 00:38

## 2019-05-10 RX ADMIN — SENNOSIDES 8.6 MG: 8.6 TABLET, FILM COATED ORAL at 09:35

## 2019-05-10 RX ADMIN — PANTOPRAZOLE SODIUM 40 MG: 40 TABLET, DELAYED RELEASE ORAL at 06:58

## 2019-05-10 RX ADMIN — GENTAMICIN SULFATE 80 MG: 80 INJECTION, SOLUTION INTRAVENOUS at 03:51

## 2019-05-10 RX ADMIN — ONDANSETRON 4 MG: 2 INJECTION INTRAMUSCULAR; INTRAVENOUS at 02:49

## 2019-05-10 RX ADMIN — AMITRIPTYLINE HYDROCHLORIDE 75 MG: 75 TABLET, FILM COATED ORAL at 00:38

## 2019-05-10 RX ADMIN — MORPHINE SULFATE 4 MG: 2 INJECTION, SOLUTION INTRAMUSCULAR; INTRAVENOUS at 02:44

## 2019-05-10 RX ADMIN — MORPHINE SULFATE 4 MG: 2 INJECTION, SOLUTION INTRAMUSCULAR; INTRAVENOUS at 06:58

## 2019-05-10 RX ADMIN — AMLODIPINE BESYLATE 10 MG: 10 TABLET ORAL at 09:35

## 2019-05-10 RX ADMIN — DOCUSATE SODIUM 100 MG: 100 CAPSULE, LIQUID FILLED ORAL at 00:38

## 2019-05-10 RX ADMIN — MORPHINE SULFATE 4 MG: 2 INJECTION, SOLUTION INTRAMUSCULAR; INTRAVENOUS at 09:32

## 2019-05-10 RX ADMIN — SODIUM CHLORIDE: 9 INJECTION, SOLUTION INTRAVENOUS at 05:03

## 2019-05-10 ASSESSMENT — PAIN SCALES - GENERAL
PAINLEVEL_OUTOF10: 10
PAINLEVEL_OUTOF10: 0
PAINLEVEL_OUTOF10: 7
PAINLEVEL_OUTOF10: 9
PAINLEVEL_OUTOF10: 7
PAINLEVEL_OUTOF10: 5
PAINLEVEL_OUTOF10: 8
PAINLEVEL_OUTOF10: 6
PAINLEVEL_OUTOF10: 7
PAINLEVEL_OUTOF10: 10
PAINLEVEL_OUTOF10: 7
PAINLEVEL_OUTOF10: 8
PAINLEVEL_OUTOF10: 9

## 2019-05-10 ASSESSMENT — PAIN DESCRIPTION - PROGRESSION

## 2019-05-10 ASSESSMENT — PAIN DESCRIPTION - LOCATION: LOCATION: SCROTUM

## 2019-05-10 ASSESSMENT — PAIN DESCRIPTION - PAIN TYPE: TYPE: SURGICAL PAIN

## 2019-05-10 ASSESSMENT — PAIN DESCRIPTION - DESCRIPTORS: DESCRIPTORS: SHOOTING

## 2019-05-10 ASSESSMENT — PAIN - FUNCTIONAL ASSESSMENT: PAIN_FUNCTIONAL_ASSESSMENT: PREVENTS OR INTERFERES SOME ACTIVE ACTIVITIES AND ADLS

## 2019-05-10 ASSESSMENT — PAIN DESCRIPTION - ONSET: ONSET: ON-GOING

## 2019-05-10 ASSESSMENT — PAIN DESCRIPTION - FREQUENCY: FREQUENCY: CONTINUOUS

## 2019-05-10 NOTE — PROGRESS NOTES
Discharge teaching and instructions for diagnosis/procedure of revsion of sphintor completed with patient using teachback method. AVS reviewed. Dynahex soap with home going incision care sheet given. Unused medications, especially pain medications, should be disposed to ensure medications do not end up being misused in the future, as well as helping to ensure drugs are not impacting the environment. [facility] [employer] and many local police agencies have medication take-back bins to properly destroy these medications. Please see the site https://apps. deadiversion. Chai Labso[employer]Deliver.gov/pubdispsearch/spring/main?execution=e2s1 for additional take-back bins located in your area. E prescriptions given to patient. Patient voiced understanding regarding prescriptions, follow up appointments, and care of self at home.  Discharged in a wheelchair to  home with support per family

## 2019-05-10 NOTE — PROGRESS NOTES
Patient has a pre-existing colostomy which he keeps covered with guaze & tape. Noticed that it was leaking very scant amount of brown watery stool. When dressing removed noticed stoma was 3-4x normal size. Still pink and moist. Fresh dressing applied. Juan Ramon Edwards called and left voicemail with him. Then sent a Perfect Serve message (awaiting response). Called resource nurse JESSICA Mai for second opinion. Stated she would be to unit soon. Awaiting response from Urology provider.

## 2019-05-10 NOTE — CARE COORDINATION
5/10/19, 1:51 PM    Discharge plan discussed by  and . Discharge plan reviewed with patient/ family. Patient/ family verbalize understanding of discharge plan and are in agreement with plan. Understanding was demonstrated using the teach back method. Pt to be discharged to home. Denies needs or services.

## 2019-05-10 NOTE — PROGRESS NOTES
[address]  [address] [address]  Dept: [phone]  Loc: [phone]  Visit Date: 2019  Urology Progress Note    Chief Complaint: Urinary incontinence    Subjective:   Patient is resting in bed, stevenson catheter draining clear, yellow urine, +flatus, -BM, lower extremity paralysis so needs assistance transferring, tolerating regular diet, denies any nausea or vomiting. There are complaints of mild abdominal and scrotal pain at this time. He admits to some swelling around his colostomy site. Wound care/ostomy nurses were in to see patient. Vitals:  BP (!) 145/83   Pulse 79   Temp 98.6 °F (37 °C) (Oral)   Resp 16   Ht 5' 7\" (1.702 m)   Wt 145 lb (65.8 kg)   SpO2 97%   BMI 22.71 kg/m²   Temp  Av °F (36.7 °C)  Min: 96.6 °F (35.9 °C)  Max: 99.4 °F (37.4 °C)    Intake/Output Summary (Last 24 hours) at 5/10/2019 0917  Last data filed at 5/10/2019 0700  Gross per 24 hour   Intake 3616.92 ml   Output 850 ml   Net 2766.92 ml       Social History     Socioeconomic History    Marital status:       Spouse name: Not on file    Number of children: Not on file    Years of education: Not on file    Highest education level: Not on file   Occupational History    Not on file   Social Needs    Financial resource strain: Not on file    Food insecurity:     Worry: Not on file     Inability: Not on file    Transportation needs:     Medical: Not on file     Non-medical: Not on file   Tobacco Use    Smoking status: Former Smoker     Packs/day: 1.00     Last attempt to quit: 2013     Years since quittin.7    Smokeless tobacco: Never Used    Tobacco comment: using vapor no nicotine   Substance and Sexual Activity    Alcohol use: Yes     Comment: rarely    Drug use: No    Sexual activity: Not Currently   Lifestyle    Physical activity:     Days per week: Not on file     Minutes per session: Not on file    Stress: Not on file   Relationships    Social connections:     Talks on phone: Not on file     Gets together: Not on file     Attends Presyb[employer]ian service: Not on file     Active member of club or organization: Not on file     Attends meetings of clubs or organizations: Not on file     Relationship status: Not on file    Intimate partner violence:     Fear of current or ex partner: Not on file     Emotionally abused: Not on file     Physically abused: Not on file     Forced sexual activity: Not on file   Other Topics Concern    Not on file   Social History Narrative    Not on file     Family History   Problem Relation Age of Onset    Diabetes Mother     High Blood Pressure Mother     Kidney Disease Mother     Cancer Neg Hx     Stroke Neg Hx     Heart Disease Neg Hx      Allergies   Allergen Reactions    Asa Buff (Mag [Buffered Aspirin]      Tongue swelling    Prednisone      Insomnia, confusion, restless,    Tape [Adhesive Tape] Rash       Objective:    Constitutional: Alert and oriented times x3, no acute distress, and cooperative to examination with appropriate mood and affect. HEENT:   Head:         Normocephalic and atraumatic. Mucous membranes are normal.   Eyes:         EOM are normal. No scleral icterus. Nose:    The external appearance of the nose is normal  Ears: The ears appear normal to external inspection. Cardiovascular:       Normal rate, regular rhythm. Pulmonary/Chest:  Normal respiratory rate and rhthym. No use of accessory muscles. Lungs clear bilaterally. Abdominal:          Soft. Mild tenderness to LLQ around ostomy. Stoma pink and moist with some edema noted. Active bowel sounds. Genitalia:    Hinton catheter draining clear, yellow urine. Normal circumcised penis. Scrotum tender to touch with moderate ecchymosis and swelling to scrotum. Perineal incision well-approximated and healing without drainage. No signs of infection.   Dimple noted in right scrotum and palpated properly working sphincter currently set open. Right inguinal incision edges approximated with minimal serosanguinous drainage on guaze. Musculoskeletal:    Lower extremity paralysis. He exhibits no edema or tenderness of lower extremities. Atrophy lower extremities  Extremities:    No cyanosis, clubbing, or edema present. Neurological:    Alert and oriented. Labs:  WBC:    Lab Results   Component Value Date    WBC 10.6 05/10/2019     Hemoglobin/Hematocrit:    Lab Results   Component Value Date    HGB 10.2 05/10/2019    HCT 32.5 05/10/2019     BMP:    Lab Results   Component Value Date     05/10/2019    K 3.7 05/10/2019     05/10/2019    CO2 20 05/10/2019    BUN 12 05/10/2019    LABALBU 3.7 04/11/2019    LABALBU 3.6 08/22/2011    CREATININE 0.6 05/10/2019    CALCIUM 8.3 05/10/2019    LABGLOM >90 05/10/2019       Impression:    Urinary incontinence  Bladder spasms  Hx lower paraplegia    Plan:    POD # 1 Revision of artificial urinary sphincter using a double cuff, with cystoscopy    Education provided regarding sphincter valve control. Patient to leave valve open and will discuss at follow-up appointment. No need for antibiotics at discharge    Remove catheter. Change inguinal dressing to keep clean and dry.     ASHWIN Engle  05/10/19 9:17 AM  Urology

## 2019-05-10 NOTE — TELEPHONE ENCOUNTER
Unable to prescribe Toradol due to Aspirin allergy. Continue Ultram, Percocet, and Ditropan for pain. Called Jose David Hackett and discussed the above with him after he called 5E with c/o pain after arriving home. Jose David Hackett is agreeable with the plan.     Electronically signed by ASHWIN White CNP on 5/10/19 at 6:20 PM

## 2019-05-10 NOTE — DISCHARGE SUMMARY
Patient Identification  Malina Campos is a 52 y.o. male. :  1970  Admit Date:  2019    Discharge date: 05/10/19                                    Disposition: home    Discharge Diagnoses:   Patient Active Problem List   Diagnosis    GERD (gastroesophageal reflux disease)    Lower paraplegia (HCC)    Chondromatosis    Shortness of breath    Fatigue    Left nephrolithiasis    Right lower quadrant abdominal pain    Dizziness    Hirschsprung's disease    Fatigue due to exposure    Acute cystitis without hematuria    Acid-base disorder, mixed    SIRS due to infectious process with organ dysfunction (HCC)    Bladder spasms    HTN (hypertension), benign    Pain aggravated by activities of daily living    Spinal cord tumor with multiple back surgeries    Impaired mobility and ADLs - due to previous spine tumor./ surgeries    Sepsis ([address]    Nephrolithiasis    Acute cystitis without hematuria    Abnormal abdominal CT scan    Ureteral stone    Pelvic abscess in [facility])    Colonization with drug-resistant bacteria    Recurrent UTI    Bacteriuria, asymptomatic    Post procedure discomfort       Consults: Wound/Ostomy    Surgery: Revision of artificial urinary sphincter using a double cuff, with cystoscopy    Patient Instructions: Activity: no heavy lifting, pushing, pulling for 6 weeks, no driving while on analgesics. Diet: As tolerated  Follow-up with Dr. Tara Colón in 1 week. Hospital course: Patient underwent revision of artificial urinary sphincter using a double cuff, with cystoscopy with no complications. Post-operatively he remained stable. Patient is tolerating diet, stevenson removed and leaking urine, pain is moderate but controlled, moving freely without restrictions, having flatus and BM. Time spent for discharge 35 min.

## 2019-05-10 NOTE — PROGRESS NOTES
Present to pt room for consult of \"stoma protruding from colostomy site\". Nurse informs wound ostomy that pt states that his stoma has been \"sticking out more\". Pt has had stoma since he was an adolescence. Pt does not use pouching system just gauze and tapes in place. Uses restroom for bowel movements. Pt states that he has had bowel movements since surgery yesterday. Pt abdomen palpated. Pt does have tenderness and distention noted to his abdomen. Pt denies any herniation or issues. States he has not had issues with abdominal pain or distention since surgical procedure yesterday. Denies issues with stoma. Pt states he is currently having bowel movements. Nursing to monitor pt and to notify physician if pt continues to have unresolved abdominal pain and distention. Stoma moist, red and protrudes. Nabila stomal skin dry and intact. No issues noted with stoma. Call wound ostomy as needed for any issues.

## 2019-05-10 NOTE — PROGRESS NOTES
2116-Patient to PACU. Report received by Dr Aneudy Guevara and Ivis Huerta RN. Patient responds to voice. Denies pain and discomfort. VSS. IV patent infusing at 100 ml/hr no redness or swelling at site noted. 2120-patient c/o nausea. Phenergan given as ordered. 2130-patient restless in bed. Repositioned. 2140- patient c/o pain 9/10 fentanyl given as ordered  2150-patient c/o pain 10/10 fentanyl given as ordered. 2157-Patient resting quietly in bed. VSS  2210-Patient meets DC criteria resting quietly in bed. Denies pain or discomfort. VSS. Report given to 5E RN and family notified.

## 2019-05-10 NOTE — PROGRESS NOTES
Received orders from [facility] to consult wound/ostomy and that urology will assess when they round.

## 2019-05-12 NOTE — OP NOTE
double cuff sphincter was in place and the cuffs and the tubing were carefully dissected out. All pervious hardware was removed. The tissue around the urethra was very scarred and dissection of the urethra was somewhat difficult and took additional time. A groin incision was made on the right in order to remove the previous tubing and reservoir. This was done slowly and carefully to avoid urethral injury. Both previous cuffs were  but were distal and some change in the location of the sphincters was made. This was a very difficult dissection and this and the entire procedure required more than twice the standard time and effort. The more distal sphincter was a 4 and the distal but more proximal cuff was a 5.     The location for sphincter placement was identified and was measured and a decision to place a 3.5 cm cuff distally and a 4 cm cuff more proximally was made. The tape was left around the urethral for easy identification for cuff placement. A surgical gauze was soaked in antibiotic saline and then placed in the perineal incision to keep the tissue moist. Attention was then turned to the pump and balloon.     At this point an additional incision was made, sub-inguinally on the left, and this was carried down and the inguinal ring on the left was found. A space was made behind the pubic symphysis and the reservoir was placed into the pelvis. The reservoir was filled with 23 cc of sterile saline. The cuff was then measured and placed. The tubing from the cuff was passed up into the sub-inguinal incision. The scrotal wall was pushed up into the sub-inguinal incision in order to make a pre-dartos pouch. With the pouch formed, the pump was placed and then held in place with a sunshine clamp over the tubing.     The tubing connections were then made per manufacturers directions. The rubber shod clamps were removed and the sphincters activated. Both appeared to function well.  The sphincter was then de-activated and locked off.     The perineal incision was closed in layers as were the sub-inguinal incisions. Local anesthetic was instilled and then the incisions glued closed as he had a tape allergy.     He was then taken out of lithotomy and awakened in the OR. He was transferred to the PACU in stable condition.      Eyad tolerated the procedure well and there were no complications.

## 2019-05-12 NOTE — INTERVAL H&P NOTE
[address] [address]  History and Physical Update    Pt Name: Malina Odonnell. MRN: [id number]  YOB: 1970  Date of evaluation: 5/12/2019    [] I have examined the patient and reviewed the H&P/Consult and there are no changes to the patient or plans. [x] I have examined the patient and reviewed the H&P/Consult and have noted the following changes: No changes per patient.         Kimberly Davis MD  Electronically signed 5/12/2019 at 7:13 PM

## 2019-05-12 NOTE — BRIEF OP NOTE
Brief Postoperative Note  ______________________________________________________________    Patient: Marisa Siddiqi. YOB: 1970  MRN: [id number]  Date of Procedure: 5/9/2019    Pre-Op Diagnosis: URINARY INCONTINENCE, BLADDER SPASMS, LOWER PARAPLEGIA, NON-FUNCTIONING DOUBLE CUFF URINARY SPHINCTER, 4 PREVIOUS SURGERIES AND REVISIONS    Post-Op Diagnosis: Same       Procedure(s):  REVISION OF ARTIFICAL URINARY SPHINCTER USING A DOUBLE CUFF, WITH CYSTOSCOPY -- ADDED PROCEDURAL SERVICES    Anesthesia: General    Surgeon(s):  Shelly Grullon MD    Assistant: none    Estimated Blood Loss (mL): 75 cc    Complications: none    Specimens:   * No specimens in log *    Implants:  Implant Name Type Inv.  Item Serial No.  Lot No. LRB No. Used   IMPL PENILE 800 ACCY KT Penile/Prosthesis IMPL PENILE 800 ACCY KT  [employer]: INTERVENTIONAL CARDIO  N/A 1   IMPL PENILE PRESS REGULATION [id number] Penile/Prosthesis IMPL PENILE PRESS REGULATION [id number]  [employer]: INTERVENTIONAL CARDIO  N/A 1   IMPL PENILE PUMP CTRL INHIBIZONE Penile/Prosthesis IMPL PENILE PUMP CTRL INHIBIZONE  [employer]: INTERVENTIONAL CARDIO  N/A 1    Cuff with IZ   [id number]  [id number] N/A 1    Cuff with IZ   [id number]  [id number] N/A 1         Drains:   Colostomy  (Active)   Stomal Appliance Other (Comment) 5/10/2019  4:52 AM   Stoma  Assessment H. Rivera Colon 5/9/2019  3:12 PM   Stool Amount Other (Comment) 5/9/2019  3:12 PM   Output (mL) 0 ml 5/10/2019  4:52 AM       [REMOVED] Urethral Catheter Non-latex (Removed)       [REMOVED] Urethral Catheter Non-latex 14 fr (Removed)   Catheter Indications Perioperative use in selected surgeries including but not limited to urologic, pelvic or need for intraoperative monitoring of urinary output due to prolonged surgery, large volume infusion or need for diuretic therapy in surgery 5/10/2019 12:12 PM   Site Assessment Pink 5/10/2019 12:12 PM   Urine Color Green 5/10/2019 12:12 PM   Urine

## 2019-05-13 ENCOUNTER — TELEPHONE (OUTPATIENT)
Dept: UROLOGY | Age: 49
End: 2019-05-13

## 2019-05-13 ENCOUNTER — TELEPHONE (OUTPATIENT)
Dept: FAMILY MEDICINE CLINIC | Age: 49
End: 2019-05-13

## 2019-05-13 NOTE — TELEPHONE ENCOUNTER
Richard Fuller at [address] would like to know if the norco should be dispensed that was written on 05/10/2019. Percocet 90 tabswas just filled on 05/06/2019. Please advise. Thank you.

## 2019-05-13 NOTE — TELEPHONE ENCOUNTER
[facility] Transitions Initial Follow Up Call    Call within 2 business days of discharge: Yes     Patient: Nolan Sawant.  Patient : 1970   MRN: [id number]  Reason for Admission: Sphincterotomy  Discharge Date: 5/10/19 RARS: Readmission Risk Score: 0       Spoke with: Stephen Monroy    Discharge department/facility: [facility]    Non-face-to-face services provided:  Scheduled appointment with PCP-Dr Ana Curry    Follow Up  Future Appointments   Date Time Provider Karel Wiseman   2019  8:00 AM Mercy Maier MD SRPX MANZANO [facility] - [facility]ANA [address] [facility]   2019  9:15 AM MD BRIANA Kern AM[address]GG II[address]REA [facility] - [address], [facility] ([address] [place] [address])

## 2019-05-17 ENCOUNTER — OFFICE VISIT (OUTPATIENT)
Dept: FAMILY MEDICINE CLINIC | Age: 49
End: 2019-05-17
Payer: MEDICARE

## 2019-05-17 VITALS
DIASTOLIC BLOOD PRESSURE: 88 MMHG | SYSTOLIC BLOOD PRESSURE: 155 MMHG | RESPIRATION RATE: 16 BRPM | TEMPERATURE: 98.8 F | HEART RATE: 96 BPM

## 2019-05-17 DIAGNOSIS — Z98.890 S/P UROLOGICAL SURGERY: Primary | ICD-10-CM

## 2019-05-17 DIAGNOSIS — G82.20 LOWER PARAPLEGIA (HCC): Chronic | ICD-10-CM

## 2019-05-17 PROCEDURE — 99495 TRANSJ CARE MGMT MOD F2F 14D: CPT | Performed by: FAMILY MEDICINE

## 2019-05-17 PROCEDURE — 1111F DSCHRG MED/CURRENT MED MERGE: CPT | Performed by: FAMILY MEDICINE

## 2019-05-18 NOTE — PROGRESS NOTES
Post-Discharge Transitional Care Management Services or Hospital Follow Up      Sydney Mcdonald YOB: 1970    Date of Office Visit:  5/17/2019  Date of Hospital Admission: 5/9/19  Date of Hospital Discharge: 5/10/19  Risk of hospital readmission (high >=14%. Medium >=10%) :Readmission Risk Score: 0      Care management risk score Rising risk (score 2-5) and Complex Care (Scores >=6): 4     Non face to face  following discharge, date last encounter closed (first attempt may have been earlier): 5/13/2019  5:38 PM    Call initiated 2 business days of discharge: Yes    Patient Active Problem List   Diagnosis    GERD (gastroesophageal reflux disease)    Lower paraplegia ([address]    Chondromatosis    Shortness of breath    Fatigue    Left nephrolithiasis    Right lower quadrant abdominal pain    Dizziness    Hirschsprung's disease    Fatigue due to exposure    Acute cystitis without hematuria    Acid-base disorder, mixed    SIRS due to infectious process with organ dysfunction ([address]    Bladder spasms    HTN (hypertension), benign    Pain aggravated by activities of daily living    Spinal cord tumor with multiple back surgeries    Impaired mobility and ADLs - due to previous spine tumor./ surgeries    Sepsis ([address]    Nephrolithiasis    Acute cystitis without hematuria    Abnormal abdominal CT scan    Ureteral stone    Pelvic abscess in male [facility])    Colonization with drug-resistant bacteria    Recurrent UTI    Bacteriuria, asymptomatic    Post procedure discomfort    Scrotal pain    Urinary incontinence       Allergies   Allergen Reactions    Asa Buff (Mag [Buffered Aspirin]      Tongue swelling    Prednisone      Insomnia, confusion, restless,    Tape [Adhesive Tape] Rash       Medications listed as ordered at the time of discharge from hospital   Jo Scott    Home Medication Instructions ALDO:    Printed on:05/18/19 1331   Medication Information amitriptyline (ELAVIL) 75 MG tablet  Take 75 mg by mouth nightly             fentaNYL (DURAGESIC) 25 MCG/HR  Place 1 patch onto the skin every 72 hours . gabapentin (NEURONTIN) 800 MG tablet  Take 800 mg by mouth 3 times daily             Misc. Devices ([place]) MISC  4\" RoHo cushion Diagnosis: G82.20 Lower Paraplegia             oxybutynin (DITROPAN) 5 MG tablet  Take 1 tablet by mouth 3 times daily             oxyCODONE-acetaminophen (PERCOCET)  MG per tablet  Take 1 tablet by mouth every 8 hours as needed for Pain . Pantoprazole Sodium (PROTONIX PO)  Take 40 mg by mouth 2 times daily              traMADol (ULTRAM) 50 MG tablet  Take by mouth every 6 hours as needed for Pain 1-2 tab                   Medications marked \"taking\" at this time  Outpatient Medications Marked as Taking for the 5/17/19 encounter (Office Visit) with Jalil Saldivar MD   Medication Sig Dispense Refill    amitriptyline (ELAVIL) 75 MG tablet Take 75 mg by mouth nightly      Pantoprazole Sodium (PROTONIX PO) Take 40 mg by mouth 2 times daily       oxybutynin (DITROPAN) 5 MG tablet Take 1 tablet by mouth 3 times daily 270 tablet 3    Misc. Devices ([place]) MISC 4\" RoHo cushion Diagnosis: G82.20 Lower Paraplegia 1 each 0    traMADol (ULTRAM) 50 MG tablet Take by mouth every 6 hours as needed for Pain 1-2 tab      oxyCODONE-acetaminophen (PERCOCET)  MG per tablet Take 1 tablet by mouth every 8 hours as needed for Pain .  gabapentin (NEURONTIN) 800 MG tablet Take 800 mg by mouth 3 times daily      fentaNYL (DURAGESIC) 25 MCG/HR Place 1 patch onto the skin every 72 hours .           Medications patient taking as of now reconciled against medications ordered at time of hospital discharge: Yes    Chief Complaint   Patient presents with    Follow-Up from [facility] 5/10/19: Revision of artificial urinary sphincter using a double cuff, with cystoscopy--has one incision under scrotum that is still painful       History of Present illness - Follow up of Hospital diagnosis(es):    Diagnosis Orders   1. S/P urological surgery     2. Lower paraplegia [facility])           Inpatient course: Discharge summary reviewed- see chart. Interval history/Current status: stable    A comprehensive review of systems was negative except for what was noted in the HPI. Vitals:    05/17/19 0802 05/17/19 0804   BP: (!) 155/88 (!) 155/88   Site: Right Upper Arm Right Upper Arm   Pulse: 96    Resp: 16    Temp: 98.8 °F (37.1 °C)    TempSrc: Oral      There is no height or weight on file to calculate BMI. Wt Readings from Last 3 Encounters:   05/09/19 145 lb (65.8 kg)   04/15/19 145 lb 15.1 oz (66.2 kg)   04/03/19 146 lb (66.2 kg)     BP Readings from Last 3 Encounters:   05/17/19 (!) 155/88   05/10/19 (!) 160/76   05/09/19 (!) 186/92        Physical Exam:  General Appearance: alert and oriented to person, place and time, well-developed and well-nourished, in no acute distress  Skin: warm and dry, no rash or erythema and incisions clear  ENT: tympanic membrane, external ear and ear canal normal bilaterally, oropharynx clear and moist with normal mucous membranes  Pulmonary/Chest: clear to auscultation bilaterally- no wheezes, rales or rhonchi, normal air movement, no respiratory distress  Cardiovascular: normal rate, normal S1 and S2, no gallops, intact distal pulses and no carotid bruits  Abdomen: soft, non-tender, non-distended, normal bowel sounds, no masses or organomegaly  Genitourinary: Incisions clear    Assessment/Plan:   Diagnosis Orders   1. S/P urological surgery     2.  Lower paraplegia [facility])             Medical Decision Making: moderate complexity

## 2019-05-20 ENCOUNTER — OFFICE VISIT (OUTPATIENT)
Dept: UROLOGY | Age: 49
End: 2019-05-20
Payer: MEDICARE

## 2019-05-20 VITALS
BODY MASS INDEX: 22.76 KG/M2 | DIASTOLIC BLOOD PRESSURE: 82 MMHG | HEIGHT: 67 IN | WEIGHT: 145 LBS | SYSTOLIC BLOOD PRESSURE: 138 MMHG

## 2019-05-20 DIAGNOSIS — R32 URINARY INCONTINENCE, UNSPECIFIED TYPE: Primary | ICD-10-CM

## 2019-05-20 LAB
BILIRUBIN URINE: NEGATIVE
BLOOD URINE, POC: ABNORMAL
CHARACTER, URINE: CLEAR
COLOR, URINE: YELLOW
GLUCOSE URINE: NEGATIVE MG/DL
KETONES, URINE: NEGATIVE
LEUKOCYTE CLUMPS, URINE: ABNORMAL
NITRITE, URINE: NEGATIVE
PH, URINE: 7 (ref 5–9)
PROTEIN, URINE: 30 MG/DL
SPECIFIC GRAVITY, URINE: 1.02 (ref 1–1.03)
UROBILINOGEN, URINE: 0.2 EU/DL (ref 0–1)

## 2019-05-20 PROCEDURE — 99024 POSTOP FOLLOW-UP VISIT: CPT | Performed by: UROLOGY

## 2019-05-20 PROCEDURE — 81003 URINALYSIS AUTO W/O SCOPE: CPT | Performed by: UROLOGY

## 2019-05-20 ASSESSMENT — ENCOUNTER SYMPTOMS
EYE REDNESS: 0
COLOR CHANGE: 0
SHORTNESS OF BREATH: 0
CHEST TIGHTNESS: 0
ABDOMINAL PAIN: 0
EYE PAIN: 0
FACIAL SWELLING: 0
NAUSEA: 0
BACK PAIN: 0

## 2019-05-20 NOTE — PROGRESS NOTES
Subjective:      Patient ID: Sydney Reid. 52 y.o. male 1970    Chief Complaint   Patient presents with    Post-Op Check     revision of artificial urinary sphincter       HPI  Follow-up post AUS placement/revision. Past Medical History:   Diagnosis Date    Arthritis     Chondromatosis     lower spine tumor    Depression     GERD (gastroesophageal reflux disease)     Hypertension     Insomnia     Lower paraplegia (HCC)     ankles down    Meningitis spinal     at 8years old    PONV (postoperative nausea and vomiting)        Social History     Socioeconomic History    Marital status:       Spouse name: Not on file    Number of children: Not on file    Years of education: Not on file    Highest education level: Not on file   Occupational History    Not on file   Social Needs    Financial resource strain: Not on file    Food insecurity:     Worry: Not on file     Inability: Not on file    Transportation needs:     Medical: Not on file     Non-medical: Not on file   Tobacco Use    Smoking status: Former Smoker     Packs/day: 1.00     Last attempt to quit: 2013     Years since quittin.8    Smokeless tobacco: Never Used    Tobacco comment: using vapor no nicotine   Substance and Sexual Activity    Alcohol use: Yes     Comment: rarely    Drug use: No    Sexual activity: Not Currently   Lifestyle    Physical activity:     Days per week: Not on file     Minutes per session: Not on file    Stress: Not on file   Relationships    Social connections:     Talks on phone: Not on file     Gets together: Not on file     Attends Judaism service: Not on file     Active member of club or organization: Not on file     Attends meetings of clubs or organizations: Not on file     Relationship status: Not on file    Intimate partner violence:     Fear of current or ex partner: Not on file     Emotionally abused: Not on file     Physically abused: Not on file     Forced sexual activity: Not on file   Other Topics Concern    Not on file   Social History Narrative    Not on file       Family History   Problem Relation Age of Onset    Diabetes Mother     High Blood Pressure Mother     Kidney Disease Mother     Cancer Neg Hx     Stroke Neg Hx     Heart Disease Neg Hx        Past Surgical History:   Procedure Laterality Date    ABDOMEN SURGERY      ANUS SURGERY N/A 5/9/2019    REVISION OF ARTIFICAL URINARY SPHINCTER USING A DOUBLE CUFF, WITH CYSTOSCOPY performed by Kimberly Davis MD at [facility]      sphincter augmentation   ARTIFICIAL SPHINCTER MRI SAFE 1.5T    COLONOSCOPY      COLOSTOMY      DILATATION, ESOPHAGUS      ENDOSCOPY, COLON, DIAGNOSTIC      FRACTURE SURGERY      NEPHROLITHOTOMY Left 8/15/2017    LEFT PERCUTANEOUS NEPHROLITHOTRIPSY performed by Kimberly Davis MD at [address]    skin flap bottom    SPINE SURGERY      Multiple    TENDON RELEASE Bilateral     feet    URETER SURGERY      replaced on left as kid    URETHRA SURGERY  02/17/2014    REVISION OF URETHRAL SPHINCTER       Allergies   Allergen Reactions    Asa Buff (Mag [Buffered Aspirin]      Tongue swelling    Prednisone      Insomnia, confusion, restless,    Tape [Adhesive Tape] Rash         Current Outpatient Medications:     amitriptyline (ELAVIL) 75 MG tablet, Take 75 mg by mouth nightly, Disp: , Rfl:     Pantoprazole Sodium (PROTONIX PO), Take 40 mg by mouth 2 times daily , Disp: , Rfl:     oxybutynin (DITROPAN) 5 MG tablet, Take 1 tablet by mouth 3 times daily, Disp: 270 tablet, Rfl: 3    Misc.  Devices ([address]) [facility], 4\" RoHo cushion Diagnosis: G82.20 Lower Paraplegia, Disp: 1 each, Rfl: 0    traMADol (ULTRAM) 50 MG tablet, Take by mouth every 6 hours as needed for Pain 1-2 tab, Disp: , Rfl:     oxyCODONE-acetaminophen (PERCOCET)  MG per tablet, Take 1 tablet by mouth every 8 hours as needed for Pain ., Disp: , Rfl:     gabapentin (NEURONTIN) 800 MG tablet, Take 800 mg by mouth 3 times daily, Disp: , Rfl:     fentaNYL (DURAGESIC) 25 MCG/HR, Place 1 patch onto the skin every 72 hours . , Disp: , Rfl:     Review of Systems   Constitutional: Negative for chills and fever. HENT: Negative for ear pain and facial swelling. Eyes: Negative for pain and redness. Respiratory: Negative for chest tightness and shortness of breath. Cardiovascular: Negative for chest pain and leg swelling. Gastrointestinal: Negative for abdominal pain and nausea. Endocrine: Negative for cold intolerance and heat intolerance. Genitourinary: Positive for frequency. Negative for difficulty urinating. Musculoskeletal: Negative for back pain and joint swelling. Skin: Negative for color change and rash. Allergic/Immunologic: Negative for environmental allergies and food allergies. Neurological: Negative for dizziness and headaches. Hematological: Does not bruise/bleed easily. /82   Ht 5' 7\" (1.702 m) Comment: stated  Wt 145 lb (65.8 kg) Comment: stated  BMI 22.71 kg/m²     Objective:   Physical Exam   Constitutional: He is oriented to person, place, and time. Vital signs are normal. He appears well-developed and well-nourished. He is cooperative. No distress. HENT:   Head: Normocephalic and atraumatic. Mouth/Throat: Oropharynx is clear and moist and mucous membranes are normal. No oropharyngeal exudate. Eyes: Pupils are equal, round, and reactive to light. EOM are normal. Right eye exhibits no discharge. Left eye exhibits no discharge. No scleral icterus. Neck: Trachea normal. No JVD present. No tracheal deviation present. Pulmonary/Chest: Effort normal. No respiratory distress. He has no wheezes. Abdominal: Soft. He exhibits no distension. There is no tenderness. There is no rebound and no CVA tenderness. Musculoskeletal: He exhibits no edema or tenderness.    Lymphadenopathy: Right: No supraclavicular adenopathy present. Left: No supraclavicular adenopathy present. Neurological: He is alert and oriented to person, place, and time. No cranial nerve deficit. Skin: Skin is warm and dry. He is not diaphoretic. Psychiatric: He has a normal mood and affect. His behavior is normal.   Nursing note and vitals reviewed. Labs    Results for POC orders placed in visit on 05/20/19   POCT Urinalysis No Micro (Auto)   Result Value Ref Range    Glucose, Ur Negative NEGATIVE mg/dl    Bilirubin Urine Negative     Ketones, Urine Negative NEGATIVE    Specific Gravity, Urine 1.020 1.002 - 1.03    Blood, UA POC Trace-lysed NEGATIVE    pH, Urine 7.00 5.0 - 9.0    Protein, Urine 30 (A) NEGATIVE mg/dl    Urobilinogen, Urine 0.20 0.0 - 1.0 eu/dl    Nitrite, Urine Negative NEGATIVE    Leukocyte Clumps, Urine Small (A) NEGATIVE    Color, Urine Yellow YELLOW-STR    Character, Urine Clear [email or web address]       Lab Results   Component Value Date    CREATININE 0.6 05/10/2019    BUN 12 05/10/2019     05/10/2019    K 3.7 05/10/2019     05/10/2019    CO2 20 (L) 05/10/2019       No results found for: PSA        Assessment:       Diagnosis Orders   1. Urinary incontinence, unspecified type  POCT Urinalysis No Micro (Auto)       Mr. Ubaldo Mcfarlane today in follow-up for Urinary incontinence, unspecified type [R32]. He just recently had a complicated revision and replacement of dual cuff artificial urinary sphincter -- ADDED PROCEDURAL SERVICES     I want to give it 4.5 weeks before we activate the artificial sphincter. He had some concerns about the incision. Dr. Timmy Chinchilla looked at this and said it looks fine. He states he is doing well. He reports the tubing is so comfortable. I have reviewed all notes sent along with this referral including notes from a recent visit to he primary care provider.   These records demonstrated the following past medical history:  Past Medical History: Diagnosis Date    Arthritis     Chondromatosis     lower spine tumor    Depression     GERD (gastroesophageal reflux disease)     Hypertension     Insomnia     Lower paraplegia (HCC)     ankles down    Meningitis spinal     at 8years old    PONV (postoperative nausea and vomiting)           Plan:        Return in 4.5 weeks. We will activate the artifical sphincter then.

## 2019-06-09 ENCOUNTER — HOSPITAL ENCOUNTER (INPATIENT)
Age: 49
LOS: 3 days | Discharge: HOME | DRG: 091 | End: 2019-06-13
Attending: INTERNAL MEDICINE | Admitting: INTERNAL MEDICINE
Payer: MEDICARE

## 2019-06-09 ENCOUNTER — APPOINTMENT (OUTPATIENT)
Dept: CT IMAGING | Age: 49
DRG: 091 | End: 2019-06-09
Payer: MEDICARE

## 2019-06-09 ENCOUNTER — APPOINTMENT (OUTPATIENT)
Dept: GENERAL RADIOLOGY | Age: 49
DRG: 091 | End: 2019-06-09
Payer: MEDICARE

## 2019-06-09 DIAGNOSIS — R47.81 SLURRED SPEECH: Primary | ICD-10-CM

## 2019-06-09 LAB
AMPHETAMINE+METHAMPHETAMINE URINE SCREEN: NEGATIVE
ANION GAP SERPL CALCULATED.3IONS-SCNC: 12 MEQ/L (ref 8–16)
APTT: 34.9 SECONDS (ref 22–38)
BACTERIA: ABNORMAL /HPF
BARBITURATE QUANTITATIVE URINE: NEGATIVE
BASOPHILS # BLD: 0.5 %
BASOPHILS ABSOLUTE: 0 THOU/MM3 (ref 0–0.1)
BENZODIAZEPINE QUANTITATIVE URINE: NEGATIVE
BILIRUBIN URINE: NEGATIVE
BLOOD, URINE: NEGATIVE
BUN BLDV-MCNC: 9 MG/DL (ref 7–22)
CALCIUM SERPL-MCNC: 8.8 MG/DL (ref 8.5–10.5)
CANNABINOID QUANTITATIVE URINE: POSITIVE
CASTS 2: ABNORMAL /LPF
CASTS UA: ABNORMAL /LPF
CHARACTER, URINE: ABNORMAL
CHLORIDE BLD-SCNC: 110 MEQ/L (ref 98–111)
CO2: 21 MEQ/L (ref 23–33)
COCAINE METABOLITE QUANTITATIVE URINE: NEGATIVE
COLOR: YELLOW
CREAT SERPL-MCNC: 0.9 MG/DL (ref 0.4–1.2)
CRYSTALS, UA: ABNORMAL
EKG ATRIAL RATE: 84 BPM
EKG P AXIS: 33 DEGREES
EKG P-R INTERVAL: 152 MS
EKG Q-T INTERVAL: 390 MS
EKG QRS DURATION: 98 MS
EKG QTC CALCULATION (BAZETT): 460 MS
EKG R AXIS: 17 DEGREES
EKG T AXIS: 18 DEGREES
EKG VENTRICULAR RATE: 84 BPM
EOSINOPHIL # BLD: 1.5 %
EOSINOPHILS ABSOLUTE: 0.1 THOU/MM3 (ref 0–0.4)
EPITHELIAL CELLS, UA: ABNORMAL /HPF
ERYTHROCYTE [DISTWIDTH] IN BLOOD BY AUTOMATED COUNT: 14.1 % (ref 11.5–14.5)
ERYTHROCYTE [DISTWIDTH] IN BLOOD BY AUTOMATED COUNT: 41.4 FL (ref 35–45)
ETHYL ALCOHOL, SERUM: < 0.01 %
GFR SERPL CREATININE-BSD FRML MDRD: 90 ML/MIN/1.73M2
GLUCOSE BLD-MCNC: 99 MG/DL (ref 70–108)
GLUCOSE URINE: NEGATIVE MG/DL
HCT VFR BLD CALC: 33.3 % (ref 42–52)
HEMOGLOBIN: 10.7 GM/DL (ref 14–18)
IMMATURE GRANS (ABS): 0.02 THOU/MM3 (ref 0–0.07)
IMMATURE GRANULOCYTES: 0.2 %
INR BLD: 0.99 (ref 0.85–1.13)
KETONES, URINE: NEGATIVE
LEUKOCYTE ESTERASE, URINE: ABNORMAL
LYMPHOCYTES # BLD: 17.3 %
LYMPHOCYTES ABSOLUTE: 1.6 THOU/MM3 (ref 1–4.8)
MAGNESIUM: 2 MG/DL (ref 1.6–2.4)
MCH RBC QN AUTO: 25.9 PG (ref 26–33)
MCHC RBC AUTO-ENTMCNC: 32.1 GM/DL (ref 32.2–35.5)
MCV RBC AUTO: 80.6 FL (ref 80–94)
MISCELLANEOUS 2: ABNORMAL
MONOCYTES # BLD: 7.9 %
MONOCYTES ABSOLUTE: 0.8 THOU/MM3 (ref 0.4–1.3)
NITRITE, URINE: NEGATIVE
NUCLEATED RED BLOOD CELLS: 0 /100 WBC
OPIATES, URINE: NEGATIVE
OSMOLALITY CALCULATION: 283.7 MOSMOL/KG (ref 275–300)
OXYCODONE: NEGATIVE
PH UA: 7 (ref 5–9)
PHENCYCLIDINE QUANTITATIVE URINE: NEGATIVE
PLATELET # BLD: 335 THOU/MM3 (ref 130–400)
PMV BLD AUTO: 9.5 FL (ref 9.4–12.4)
POTASSIUM SERPL-SCNC: 3.4 MEQ/L (ref 3.5–5.2)
PROTEIN UA: NEGATIVE
RBC # BLD: 4.13 MILL/MM3 (ref 4.7–6.1)
RBC URINE: ABNORMAL /HPF
RENAL EPITHELIAL, UA: ABNORMAL
SEG NEUTROPHILS: 72.6 %
SEGMENTED NEUTROPHILS ABSOLUTE COUNT: 6.9 THOU/MM3 (ref 1.8–7.7)
SODIUM BLD-SCNC: 143 MEQ/L (ref 135–145)
SPECIFIC GRAVITY, URINE: 1.01 (ref 1–1.03)
TROPONIN T: < 0.01 NG/ML
UROBILINOGEN, URINE: 0.2 EU/DL (ref 0–1)
WBC # BLD: 9.5 THOU/MM3 (ref 4.8–10.8)
WBC UA: ABNORMAL /HPF

## 2019-06-09 PROCEDURE — 2709999900 HC NON-CHARGEABLE SUPPLY

## 2019-06-09 PROCEDURE — 80048 BASIC METABOLIC PNL TOTAL CA: CPT

## 2019-06-09 PROCEDURE — 85610 PROTHROMBIN TIME: CPT

## 2019-06-09 PROCEDURE — 93010 ELECTROCARDIOGRAM REPORT: CPT | Performed by: NUCLEAR MEDICINE

## 2019-06-09 PROCEDURE — G0480 DRUG TEST DEF 1-7 CLASSES: HCPCS

## 2019-06-09 PROCEDURE — 80307 DRUG TEST PRSMV CHEM ANLYZR: CPT

## 2019-06-09 PROCEDURE — G0378 HOSPITAL OBSERVATION PER HR: HCPCS

## 2019-06-09 PROCEDURE — 83735 ASSAY OF MAGNESIUM: CPT

## 2019-06-09 PROCEDURE — 81001 URINALYSIS AUTO W/SCOPE: CPT

## 2019-06-09 PROCEDURE — 70498 CT ANGIOGRAPHY NECK: CPT

## 2019-06-09 PROCEDURE — 96365 THER/PROPH/DIAG IV INF INIT: CPT

## 2019-06-09 PROCEDURE — 87086 URINE CULTURE/COLONY COUNT: CPT

## 2019-06-09 PROCEDURE — 2580000003 HC RX 258: Performed by: INTERNAL MEDICINE

## 2019-06-09 PROCEDURE — 99285 EMERGENCY DEPT VISIT HI MDM: CPT

## 2019-06-09 PROCEDURE — 6360000004 HC RX CONTRAST MEDICATION: Performed by: PHYSICIAN ASSISTANT

## 2019-06-09 PROCEDURE — 6360000002 HC RX W HCPCS: Performed by: INTERNAL MEDICINE

## 2019-06-09 PROCEDURE — 71045 X-RAY EXAM CHEST 1 VIEW: CPT

## 2019-06-09 PROCEDURE — 6370000000 HC RX 637 (ALT 250 FOR IP): Performed by: PHYSICIAN ASSISTANT

## 2019-06-09 PROCEDURE — 70450 CT HEAD/BRAIN W/O DYE: CPT

## 2019-06-09 PROCEDURE — 85730 THROMBOPLASTIN TIME PARTIAL: CPT

## 2019-06-09 PROCEDURE — 70496 CT ANGIOGRAPHY HEAD: CPT

## 2019-06-09 PROCEDURE — 84484 ASSAY OF TROPONIN QUANT: CPT

## 2019-06-09 PROCEDURE — 93005 ELECTROCARDIOGRAM TRACING: CPT | Performed by: PHYSICIAN ASSISTANT

## 2019-06-09 PROCEDURE — 36415 COLL VENOUS BLD VENIPUNCTURE: CPT

## 2019-06-09 PROCEDURE — 6370000000 HC RX 637 (ALT 250 FOR IP): Performed by: INTERNAL MEDICINE

## 2019-06-09 PROCEDURE — 85025 COMPLETE CBC W/AUTO DIFF WBC: CPT

## 2019-06-09 RX ORDER — POTASSIUM CHLORIDE 20 MEQ/1
40 TABLET, EXTENDED RELEASE ORAL ONCE
Status: COMPLETED | OUTPATIENT
Start: 2019-06-09 | End: 2019-06-09

## 2019-06-09 RX ORDER — OXYCODONE AND ACETAMINOPHEN 10; 325 MG/1; MG/1
1 TABLET ORAL EVERY 8 HOURS PRN
Status: DISCONTINUED | OUTPATIENT
Start: 2019-06-09 | End: 2019-06-13 | Stop reason: HOSPADM

## 2019-06-09 RX ORDER — FENTANYL 25 UG/H
1 PATCH TRANSDERMAL
Status: DISCONTINUED | OUTPATIENT
Start: 2019-06-10 | End: 2019-06-13 | Stop reason: HOSPADM

## 2019-06-09 RX ORDER — CLOPIDOGREL 300 MG/1
300 TABLET, FILM COATED ORAL ONCE
Status: COMPLETED | OUTPATIENT
Start: 2019-06-09 | End: 2019-06-09

## 2019-06-09 RX ORDER — AMITRIPTYLINE HYDROCHLORIDE 25 MG/1
75 TABLET, FILM COATED ORAL NIGHTLY
Status: DISCONTINUED | OUTPATIENT
Start: 2019-06-09 | End: 2019-06-13 | Stop reason: HOSPADM

## 2019-06-09 RX ORDER — PANTOPRAZOLE SODIUM 40 MG/1
40 TABLET, DELAYED RELEASE ORAL 2 TIMES DAILY
Status: DISCONTINUED | OUTPATIENT
Start: 2019-06-09 | End: 2019-06-13 | Stop reason: HOSPADM

## 2019-06-09 RX ORDER — GABAPENTIN 400 MG/1
800 CAPSULE ORAL 3 TIMES DAILY
Status: DISCONTINUED | OUTPATIENT
Start: 2019-06-09 | End: 2019-06-13 | Stop reason: HOSPADM

## 2019-06-09 RX ORDER — OXYBUTYNIN CHLORIDE 5 MG/1
5 TABLET ORAL 3 TIMES DAILY
Status: DISCONTINUED | OUTPATIENT
Start: 2019-06-09 | End: 2019-06-13 | Stop reason: HOSPADM

## 2019-06-09 RX ADMIN — OXYCODONE AND ACETAMINOPHEN 1 TABLET: 10; 325 TABLET ORAL at 17:33

## 2019-06-09 RX ADMIN — CEFTRIAXONE SODIUM 1 G: 1 INJECTION, POWDER, FOR SOLUTION INTRAMUSCULAR; INTRAVENOUS at 20:46

## 2019-06-09 RX ADMIN — GABAPENTIN 800 MG: 400 CAPSULE ORAL at 17:43

## 2019-06-09 RX ADMIN — CLOPIDOGREL BISULFATE 300 MG: 300 TABLET, FILM COATED ORAL at 13:07

## 2019-06-09 RX ADMIN — IOPAMIDOL 80 ML: 755 INJECTION, SOLUTION INTRAVENOUS at 11:59

## 2019-06-09 RX ADMIN — OXYBUTYNIN CHLORIDE 5 MG: 5 TABLET ORAL at 17:44

## 2019-06-09 RX ADMIN — PANTOPRAZOLE SODIUM 40 MG: 40 TABLET, DELAYED RELEASE ORAL at 20:45

## 2019-06-09 RX ADMIN — POTASSIUM CHLORIDE 40 MEQ: 20 TABLET, EXTENDED RELEASE ORAL at 17:44

## 2019-06-09 RX ADMIN — AMITRIPTYLINE HYDROCHLORIDE 75 MG: 25 TABLET, FILM COATED ORAL at 20:45

## 2019-06-09 ASSESSMENT — PAIN DESCRIPTION - LOCATION
LOCATION: BACK

## 2019-06-09 ASSESSMENT — PAIN SCALES - GENERAL
PAINLEVEL_OUTOF10: 6
PAINLEVEL_OUTOF10: 3
PAINLEVEL_OUTOF10: 5
PAINLEVEL_OUTOF10: 9
PAINLEVEL_OUTOF10: 10

## 2019-06-09 ASSESSMENT — PAIN DESCRIPTION - PAIN TYPE
TYPE: CHRONIC PAIN

## 2019-06-09 ASSESSMENT — PAIN DESCRIPTION - FREQUENCY
FREQUENCY: CONTINUOUS

## 2019-06-09 ASSESSMENT — PAIN DESCRIPTION - PROGRESSION
CLINICAL_PROGRESSION: GRADUALLY IMPROVING
CLINICAL_PROGRESSION: GRADUALLY WORSENING

## 2019-06-09 ASSESSMENT — ENCOUNTER SYMPTOMS
ABDOMINAL PAIN: 0
SORE THROAT: 0
DIARRHEA: 0
COUGH: 0
RHINORRHEA: 0
SHORTNESS OF BREATH: 0
VOMITING: 0
BLOOD IN STOOL: 0
WHEEZING: 0
NAUSEA: 0
CONSTIPATION: 0
BACK PAIN: 0

## 2019-06-09 ASSESSMENT — PAIN DESCRIPTION - DESCRIPTORS
DESCRIPTORS: CONSTANT

## 2019-06-09 ASSESSMENT — PAIN DESCRIPTION - DIRECTION
RADIATING_TOWARDS: LEGS

## 2019-06-09 ASSESSMENT — PAIN DESCRIPTION - ONSET
ONSET: ON-GOING

## 2019-06-09 ASSESSMENT — PAIN DESCRIPTION - ORIENTATION
ORIENTATION: LEFT;RIGHT

## 2019-06-09 ASSESSMENT — PAIN - FUNCTIONAL ASSESSMENT
PAIN_FUNCTIONAL_ASSESSMENT: PREVENTS OR INTERFERES SOME ACTIVE ACTIVITIES AND ADLS

## 2019-06-09 NOTE — ED TRIAGE NOTES
Pt presents to ER with c/o slurred speech and bilateral lower extremities weakness. Pt reports \"his friends noticed his slurred speech yesterday but he waited for his speech to get better till today but it didn't get better\". NIH stroke scale performed. Pt reports h/o bladder control surgery about 2 weeks ago. Pt noted to have dry dressing on surgical site. Noted to have what is seems like wound dehiscence at surgical site. Wound assesed by Leeann. Pt also follows wound clinic for chronic pain. Pt reports h/o back surgery at younger age, pt then had spinal meningitis. Pt resting on cot, respirations easy and unlabored. Call light within reach. Family at bedside.

## 2019-06-09 NOTE — FLOWSHEET NOTE
06/09/19 1724   Provider Notification   Reason for Communication Review case  (notify of consult)   Provider Name Dr. Anjel Martinez   Provider Notification Physician   Method of Communication Secure Message   Response Waiting for response   Notification Time    Perfect serve message sent to Dr. Anjel Martinez to notify of consult for slurred speech. Awaiting response.

## 2019-06-09 NOTE — ED NOTES
Bed: 012A  Expected date:   Expected time:   Means of arrival: [facility]  Comments:     Devora Taylor RN  06/09/19 1000

## 2019-06-09 NOTE — ED PROVIDER NOTES
[facility]  eMERGENCY dEPARTMENT eNCOUnter          [address] [facility]       Chief Complaint   Patient presents with    Extremity Weakness    Aphasia       Nurses Notes reviewed and I agree except as notedin the HPI. HISTORY OF PRESENT ILLNESS    Umesh Lubin is a 52 y.o. male with a past medical history of arthritis, GERD, paraplegia of the BLE, HTN, and depression. The patient presents to the ED for evaluation of slurred speech. Last known well was approximately 17:00 Friday evening. Patient states that he waited at home to see if the symptoms improved. He has noticed some weakness on the right as well. No past history of strokes. No additional complaints or concerns at the time of initial evaluation. REVIEW OF SYSTEMS     Review of Systems   Constitutional: Negative for chills, diaphoresis and fever. HENT: Negative for congestion, rhinorrhea and sore throat. Respiratory: Negative for cough, shortness of breath and wheezing. Cardiovascular: Negative for chest pain, palpitations and leg swelling. Gastrointestinal: Negative for abdominal pain, blood in stool, constipation, diarrhea, nausea and vomiting. Genitourinary: Negative for decreased urine volume, difficulty urinating, dysuria, frequency, hematuria and urgency. Musculoskeletal: Negative for arthralgias, back pain, joint swelling, myalgias and neck pain. Skin: Negative for rash. Neurological: Positive for speech difficulty and weakness. Negative for dizziness, light-headedness, numbness and headaches. PAST MEDICAL HISTORY    has a past medical history of Arthritis, Chondromatosis, Depression, GERD (gastroesophageal reflux disease), Hypertension, Insomnia, Lower paraplegia ([address] Meningitis spinal, and PONV (postoperative nausea and vomiting). SURGICAL HISTORY      has a past surgical history that includes Spine surgery; other surgical history (2002);  Ureter surgery; Bladder surgery; Urethra surgery (2014); colostomy; Abdomen surgery; back surgery; Colonoscopy; Endoscopy, colon, diagnostic; fracture surgery; Dilatation, esophagus; NEPHROLITHOTOMY (Left, 8/15/2017); tendon release (Bilateral); and Anus surgery (N/A, 2019). CURRENT MEDICATIONS     Current Discharge Medication List      CONTINUE these medications which have NOT CHANGED    Details   oxyCODONE-acetaminophen (PERCOCET)  MG per tablet Take 1 tablet by mouth every 8 hours as needed for Pain . amitriptyline (ELAVIL) 75 MG tablet Take 75 mg by mouth nightly      Pantoprazole Sodium (PROTONIX PO) Take 40 mg by mouth 2 times daily       oxybutynin (DITROPAN) 5 MG tablet Take 1 tablet by mouth 3 times daily  Qty: 270 tablet, Refills: 3      Misc. Devices ([place]) MISC 4\" Leanne cushion Diagnosis: G82.20 Lower Paraplegia  Qty: 1 each, Refills: 0      traMADol (ULTRAM) 50 MG tablet Take by mouth every 6 hours as needed for Pain 1-2 tab      gabapentin (NEURONTIN) 800 MG tablet Take 800 mg by mouth 3 times daily      fentaNYL (DURAGESIC) 25 MCG/HR Place 1 patch onto the skin every 72 hours . ALLERGIES     is allergic to asa buff (mag [buffered aspirin]; prednisone; and tape [adhesive tape]. FAMILY HISTORY     indicated that his mother is alive. He indicated that his father is . He indicated that the status of his neg hx is unknown.   family history includes Diabetes in his mother; High Blood Pressure in his mother; Kidney Disease in his mother. SOCIAL HISTORY      reports that he quit smoking about 5 years ago. He smoked 1.00 pack per day. He has never used smokeless tobacco. He reports that he drinks alcohol. He reports that he does not use drugs. PHYSICAL EXAM     INITIAL VITALS:  height is 5' 9\" (1.753 m) and weight is 148 lb 6.4 oz (67.3 kg). His oral temperature is 97.6 °F (36.4 °C). His blood pressure is 186/89 (abnormal) and his pulse is 86.  His respiration is 16 and oxygen saturation is 98%.    Physical Exam   Constitutional: He is oriented to person, place, and time. He appears well-developed and well-nourished. No distress. HENT:   Head: Normocephalic and atraumatic. Right Ear: External ear normal.   Left Ear: External ear normal.   Mouth/Throat: Oropharynx is clear and moist.   Eyes: Conjunctivae and EOM are normal.   Neck: Normal range of motion. Neck supple. Cardiovascular: Normal rate, regular rhythm, normal heart sounds and intact distal pulses. Exam reveals no friction rub. No murmur heard. Pulmonary/Chest: Effort normal and breath sounds normal. No respiratory distress. He has no wheezes. He has no rales. He exhibits no tenderness. Abdominal: Soft. Bowel sounds are normal. He exhibits no distension. There is no tenderness. There is no rebound. Musculoskeletal: Normal range of motion. He exhibits no edema. Neurological: He is alert and oriented to person, place, and time. Speech is slurred. Weakness of the right arm and left compared to the left. Patient answers questions appropriately. Bilateral foot drop. Skin: Skin is warm and dry. No rash noted. Nursing note and vitals reviewed. NIH Stroke Scale  Interval: Baseline  Level of Consciousness (1a. ): Alert  LOC Questions (1b. ):  Answers one correctly  LOC Commands (1c. ): Obeys both correctly  Best Gaze (2. ): Normal  Visual (3. ): No visual loss  Facial Palsy (4. ): (!) Minor  Motor Arm, Left (5a. ): No drift  Motor Arm, Right (5b. ): No drift  Motor Leg, Left (6a. ): Drift, but does not hit bed  Motor Leg, Right (6b. ): Drift, but does not hit bed  Limb Ataxia (7. ): Absent  Sensory (8. ): Normal  Best Language (9. ): No aphasia  Dysarthria (10. ): Mild to moderate dysarthria  Extinction and Inattention (11): No neglect  Total: 5      DIFFERENTIAL DIAGNOSIS:   CVA, TIA,     DIAGNOSTIC RESULTS     EKG: All EKG's are interpreted by the Emergency Department Physician who either signs or Co-signs this chart in the Abnormal; Notable for the following components:       Result Value    RBC 4.13 (*)     Hemoglobin 10.7 (*)     Hematocrit 33.3 (*)     MCH 25.9 (*)     MCHC 32.1 (*)     All other components within normal limits   BASIC METABOLIC PANEL - Abnormal; Notable for the following components:    Potassium 3.4 (*)     CO2 21 (*)     All other components within normal limits   URINE WITH REFLEXED MICRO - Abnormal; Notable for the following components:    Leukocyte Esterase, Urine MODERATE (*)     Character, Urine CLOUDY (*)     All other components within normal limits   URINE CULTURE, REFLEXED   PROTIME-INR   APTT   TROPONIN   MAGNESIUM   URINE DRUG SCREEN   ETHANOL   ANION GAP   OSMOLALITY   GLOMERULAR FILTRATION RATE, ESTIMATED       EMERGENCY DEPARTMENT COURSE:   Vitals:    Vitals:    06/09/19 1308 06/09/19 1431 06/09/19 1538 06/09/19 1608   BP: (!) 143/102 106/73 (!) 143/89 (!) 186/89   Pulse: 74 84 84 86   Resp: 18 20 18 16   Temp:    97.6 °F (36.4 °C)   TempSrc:    Oral   SpO2: 100% 98% 99% 98%   Weight:    148 lb 6.4 oz (67.3 kg)   Height:    5' 9\" (1.753 m)       10:27 AM:The patient was seen and evaluated. MDM:  Does have tongue swelling to ASA. Will load with Plavix 300 mg PO. Did discuss with Dr Israel Yoo here and agree with admission. Case was staffed with Dr Lv Leon. CRITICAL CARE:   None    CONSULTS:  Dr Abbey Novak and Dr Kyle Mccloud:  None     FINAL IMPRESSION      1.  Slurred speech          DISPOSITION/PLAN   Admit      PATIENT REFERRED TO:  Sabine Byers MD  [address] [facility] [id number] [place] [address] [address] [facility], [address]  [phone]            DISCHARGE MEDICATIONS:  Current Discharge Medication List          (Please note thatportions of this note were completed with a voice recognition program.  Efforts were made to edit the dictations but occasionally words are mis-transcribed.)    The patient was given an opportunity to see the

## 2019-06-09 NOTE — PLAN OF CARE
Problem: Pain:  Goal: Pain level will decrease  Description  Pain level will decrease  Outcome: Ongoing  Note:   Pt's stated pain goal is \"5\". Pt has chronic back pain. Pt administered percocet 1 tab q 8 hrs prn and satisfied with pain relief at this time. Problem: Pain:  Goal: Control of chronic pain  Description  Control of chronic pain  Outcome: Ongoing  Note:   Pt's stated pain goal is \"5\". Pt has chronic back pain. Pt administered percocet 1 tab q 8 hrs prn and satisfied with pain relief at this time. Problem: Falls - Risk of:  Goal: Will remain free from falls  Description  Will remain free from falls  Outcome: Ongoing  Note:   Pt has colostomy and urinary sphincter. Bed alarm on, bed in lowest position. Pt normally uses wheelchair to transfer at home. Pt has not ambulated yet this shift. Problem: Risk for Impaired Skin Integrity  Goal: Tissue integrity - skin and mucous membranes  Description  Structural intactness and normal physiological function of skin and  mucous membranes. Outcome: Ongoing  Note:   Pt has colostomy, incision on scrotum and pelvis incision. Incisions open to air at this time. Pt has redness on bilateral heels that blanches. Pt turns self.      Problem: HEMODYNAMIC STATUS  Goal: Patient has stable vital signs and fluid balance  Outcome: Ongoing  Note:      06/09/19 1608   Vital Signs   Temp 97.6 °F (36.4 °C)   Temp Source Oral   Pulse 86   Heart Rate Source Monitor   Resp 16   BP (!) 186/89   BP Location Left upper arm   BP Upper/Lower Upper   MAP (mmHg) 128   Patient Position Semi fowlers   Level of Consciousness 0   MEWS Score 1   Patient Currently in Pain Yes   Height and Weight   Height 5' 9\" (1.753 m)   Weight 148 lb 6.4 oz (67.3 kg)   BSA (Calculated - sq m) 1.81 sq meters   BMI (Calculated) 22   Pain Assessment   Pain Assessment 0-10   Pain Level 10   Pain Location Back   Pain Descriptors Constant   Pain Onset On-going   Pain Frequency Continuous   Patient's Stated Pain Goal 5   Functional Pain Assessment Prevents or interferes some active activities and ADLs   Pain Type Chronic pain   Clinical Progression Gradually worsening   Non-Pharmaceutical Pain Intervention(s) Repositioned   Pain Orientation Left;Right   Pain Radiating Towards legs   Oxygen Therapy   SpO2 98 %   Pulse Oximeter Device Mode Intermittent   Pulse Oximeter Device Location Left;Finger   O2 Device None (Room air)   Pt's VS stable at this time. BP elevated, continuing to monitor. Problem: ACTIVITY INTOLERANCE/IMPAIRED MOBILITY  Goal: Mobility/activity is maintained at optimum level for patient  Outcome: Ongoing  Note:   Pt has active movement in bilateral upper and lower extremities. Pt is paraplegic from ankle down in bilateral lower extremities. Problem: COMMUNICATION IMPAIRMENT  Goal: Ability to express needs and understand communication  Outcome: Ongoing  Note:   Pt alert and oriented x 4 at this time. Pt has dysarthria at this time. Continuing to monitor. Problem: Discharge Planning:  Goal: Participates in care planning  Description  Participates in care planning  Outcome: Ongoing  Note:   Pt from private residence. Discharge plans remain in progress at this time. Neurology to see pt. Care plan reviewed with patient.   Patient verbalizes understanding of the plan of care and contributes to goal setting at this time

## 2019-06-10 ENCOUNTER — APPOINTMENT (OUTPATIENT)
Dept: MRI IMAGING | Age: 49
DRG: 091 | End: 2019-06-10
Payer: MEDICARE

## 2019-06-10 LAB
ALBUMIN SERPL-MCNC: 3.4 G/DL (ref 3.5–5.1)
ALP BLD-CCNC: 124 U/L (ref 38–126)
ALT SERPL-CCNC: 11 U/L (ref 11–66)
ANION GAP SERPL CALCULATED.3IONS-SCNC: 14 MEQ/L (ref 8–16)
AST SERPL-CCNC: 14 U/L (ref 5–40)
BASOPHILS # BLD: 0.5 %
BASOPHILS ABSOLUTE: 0 THOU/MM3 (ref 0–0.1)
BILIRUB SERPL-MCNC: 0.2 MG/DL (ref 0.3–1.2)
BUN BLDV-MCNC: 9 MG/DL (ref 7–22)
CALCIUM SERPL-MCNC: 8.7 MG/DL (ref 8.5–10.5)
CHLORIDE BLD-SCNC: 110 MEQ/L (ref 98–111)
CHOLESTEROL, TOTAL: 130 MG/DL (ref 100–199)
CO2: 18 MEQ/L (ref 23–33)
CREAT SERPL-MCNC: 0.7 MG/DL (ref 0.4–1.2)
EOSINOPHIL # BLD: 2.8 %
EOSINOPHILS ABSOLUTE: 0.3 THOU/MM3 (ref 0–0.4)
ERYTHROCYTE [DISTWIDTH] IN BLOOD BY AUTOMATED COUNT: 14.2 % (ref 11.5–14.5)
ERYTHROCYTE [DISTWIDTH] IN BLOOD BY AUTOMATED COUNT: 41.3 FL (ref 35–45)
GFR SERPL CREATININE-BSD FRML MDRD: > 90 ML/MIN/1.73M2
GLUCOSE BLD-MCNC: 91 MG/DL (ref 70–108)
HCT VFR BLD CALC: 34.6 % (ref 42–52)
HDLC SERPL-MCNC: 48 MG/DL
HEMOCCULT STL QL: NEGATIVE
HEMOGLOBIN: 10.8 GM/DL (ref 14–18)
IMMATURE GRANS (ABS): 0.04 THOU/MM3 (ref 0–0.07)
IMMATURE GRANULOCYTES: 0.4 %
LDL CHOLESTEROL CALCULATED: 63 MG/DL
LYMPHOCYTES # BLD: 19.9 %
LYMPHOCYTES ABSOLUTE: 2 THOU/MM3 (ref 1–4.8)
MCH RBC QN AUTO: 25.2 PG (ref 26–33)
MCHC RBC AUTO-ENTMCNC: 31.2 GM/DL (ref 32.2–35.5)
MCV RBC AUTO: 80.8 FL (ref 80–94)
MONOCYTES # BLD: 9.3 %
MONOCYTES ABSOLUTE: 0.9 THOU/MM3 (ref 0.4–1.3)
NUCLEATED RED BLOOD CELLS: 0 /100 WBC
PLATELET # BLD: 343 THOU/MM3 (ref 130–400)
PMV BLD AUTO: 9.7 FL (ref 9.4–12.4)
POTASSIUM SERPL-SCNC: 3.7 MEQ/L (ref 3.5–5.2)
RBC # BLD: 4.28 MILL/MM3 (ref 4.7–6.1)
SEG NEUTROPHILS: 67.1 %
SEGMENTED NEUTROPHILS ABSOLUTE COUNT: 6.6 THOU/MM3 (ref 1.8–7.7)
SODIUM BLD-SCNC: 142 MEQ/L (ref 135–145)
TOTAL PROTEIN: 7.1 G/DL (ref 6.1–8)
TRIGL SERPL-MCNC: 94 MG/DL (ref 0–199)
WBC # BLD: 9.8 THOU/MM3 (ref 4.8–10.8)

## 2019-06-10 PROCEDURE — 95819 EEG AWAKE AND ASLEEP: CPT

## 2019-06-10 PROCEDURE — 36415 COLL VENOUS BLD VENIPUNCTURE: CPT

## 2019-06-10 PROCEDURE — 2580000003 HC RX 258: Performed by: INTERNAL MEDICINE

## 2019-06-10 PROCEDURE — 80061 LIPID PANEL: CPT

## 2019-06-10 PROCEDURE — 70551 MRI BRAIN STEM W/O DYE: CPT

## 2019-06-10 PROCEDURE — 92523 SPEECH SOUND LANG COMPREHEN: CPT

## 2019-06-10 PROCEDURE — 82272 OCCULT BLD FECES 1-3 TESTS: CPT

## 2019-06-10 PROCEDURE — 2060000000 HC ICU INTERMEDIATE R&B

## 2019-06-10 PROCEDURE — 2709999900 HC NON-CHARGEABLE SUPPLY

## 2019-06-10 PROCEDURE — 99222 1ST HOSP IP/OBS MODERATE 55: CPT | Performed by: PSYCHIATRY & NEUROLOGY

## 2019-06-10 PROCEDURE — 97166 OT EVAL MOD COMPLEX 45 MIN: CPT

## 2019-06-10 PROCEDURE — 97162 PT EVAL MOD COMPLEX 30 MIN: CPT

## 2019-06-10 PROCEDURE — 80053 COMPREHEN METABOLIC PANEL: CPT

## 2019-06-10 PROCEDURE — 1200000003 HC TELEMETRY R&B

## 2019-06-10 PROCEDURE — 85025 COMPLETE CBC W/AUTO DIFF WBC: CPT

## 2019-06-10 PROCEDURE — 6360000002 HC RX W HCPCS: Performed by: INTERNAL MEDICINE

## 2019-06-10 PROCEDURE — 97110 THERAPEUTIC EXERCISES: CPT

## 2019-06-10 PROCEDURE — 6370000000 HC RX 637 (ALT 250 FOR IP): Performed by: INTERNAL MEDICINE

## 2019-06-10 PROCEDURE — 83090 ASSAY OF HOMOCYSTEINE: CPT

## 2019-06-10 PROCEDURE — 97530 THERAPEUTIC ACTIVITIES: CPT

## 2019-06-10 RX ORDER — SODIUM CHLORIDE 9 MG/ML
INJECTION, SOLUTION INTRAVENOUS CONTINUOUS
Status: DISCONTINUED | OUTPATIENT
Start: 2019-06-10 | End: 2019-06-12

## 2019-06-10 RX ORDER — HEPARIN SODIUM 5000 [USP'U]/ML
5000 INJECTION, SOLUTION INTRAVENOUS; SUBCUTANEOUS EVERY 8 HOURS SCHEDULED
Status: DISCONTINUED | OUTPATIENT
Start: 2019-06-10 | End: 2019-06-13 | Stop reason: HOSPADM

## 2019-06-10 RX ADMIN — PANTOPRAZOLE SODIUM 40 MG: 40 TABLET, DELAYED RELEASE ORAL at 21:52

## 2019-06-10 RX ADMIN — OXYBUTYNIN CHLORIDE 5 MG: 5 TABLET ORAL at 21:52

## 2019-06-10 RX ADMIN — OXYCODONE AND ACETAMINOPHEN 1 TABLET: 10; 325 TABLET ORAL at 10:24

## 2019-06-10 RX ADMIN — HEPARIN SODIUM 5000 UNITS: 5000 INJECTION INTRAVENOUS; SUBCUTANEOUS at 21:52

## 2019-06-10 RX ADMIN — GABAPENTIN 800 MG: 400 CAPSULE ORAL at 00:34

## 2019-06-10 RX ADMIN — OXYBUTYNIN CHLORIDE 5 MG: 5 TABLET ORAL at 13:30

## 2019-06-10 RX ADMIN — OXYCODONE AND ACETAMINOPHEN 1 TABLET: 10; 325 TABLET ORAL at 01:36

## 2019-06-10 RX ADMIN — AMITRIPTYLINE HYDROCHLORIDE 75 MG: 25 TABLET, FILM COATED ORAL at 21:53

## 2019-06-10 RX ADMIN — HEPARIN SODIUM 5000 UNITS: 5000 INJECTION INTRAVENOUS; SUBCUTANEOUS at 13:31

## 2019-06-10 RX ADMIN — PANTOPRAZOLE SODIUM 40 MG: 40 TABLET, DELAYED RELEASE ORAL at 09:22

## 2019-06-10 RX ADMIN — SODIUM CHLORIDE: 9 INJECTION, SOLUTION INTRAVENOUS at 13:26

## 2019-06-10 RX ADMIN — OXYBUTYNIN CHLORIDE 5 MG: 5 TABLET ORAL at 05:55

## 2019-06-10 RX ADMIN — CEFTRIAXONE SODIUM 1 G: 1 INJECTION, POWDER, FOR SOLUTION INTRAMUSCULAR; INTRAVENOUS at 21:49

## 2019-06-10 RX ADMIN — GABAPENTIN 800 MG: 400 CAPSULE ORAL at 13:30

## 2019-06-10 RX ADMIN — OXYCODONE AND ACETAMINOPHEN 1 TABLET: 10; 325 TABLET ORAL at 21:52

## 2019-06-10 RX ADMIN — GABAPENTIN 800 MG: 400 CAPSULE ORAL at 21:53

## 2019-06-10 RX ADMIN — GABAPENTIN 800 MG: 400 CAPSULE ORAL at 05:55

## 2019-06-10 RX ADMIN — OXYBUTYNIN CHLORIDE 5 MG: 5 TABLET ORAL at 00:34

## 2019-06-10 ASSESSMENT — PAIN DESCRIPTION - ONSET
ONSET: ON-GOING

## 2019-06-10 ASSESSMENT — PAIN DESCRIPTION - PROGRESSION
CLINICAL_PROGRESSION: GRADUALLY WORSENING
CLINICAL_PROGRESSION: GRADUALLY IMPROVING
CLINICAL_PROGRESSION: GRADUALLY WORSENING
CLINICAL_PROGRESSION: GRADUALLY IMPROVING
CLINICAL_PROGRESSION: GRADUALLY WORSENING
CLINICAL_PROGRESSION: GRADUALLY IMPROVING

## 2019-06-10 ASSESSMENT — PAIN DESCRIPTION - DESCRIPTORS
DESCRIPTORS: CONSTANT;DISCOMFORT
DESCRIPTORS: CONSTANT
DESCRIPTORS: CONSTANT;DISCOMFORT

## 2019-06-10 ASSESSMENT — PAIN DESCRIPTION - FREQUENCY
FREQUENCY: CONTINUOUS

## 2019-06-10 ASSESSMENT — PAIN SCALES - GENERAL
PAINLEVEL_OUTOF10: 6
PAINLEVEL_OUTOF10: 5
PAINLEVEL_OUTOF10: 10
PAINLEVEL_OUTOF10: 8
PAINLEVEL_OUTOF10: 7
PAINLEVEL_OUTOF10: 8
PAINLEVEL_OUTOF10: 7
PAINLEVEL_OUTOF10: 6

## 2019-06-10 ASSESSMENT — PAIN - FUNCTIONAL ASSESSMENT
PAIN_FUNCTIONAL_ASSESSMENT: PREVENTS OR INTERFERES SOME ACTIVE ACTIVITIES AND ADLS

## 2019-06-10 ASSESSMENT — PAIN DESCRIPTION - ORIENTATION
ORIENTATION: LEFT;RIGHT
ORIENTATION: LEFT;RIGHT
ORIENTATION: RIGHT;LEFT
ORIENTATION: LEFT;RIGHT
ORIENTATION: LEFT;RIGHT

## 2019-06-10 ASSESSMENT — PAIN DESCRIPTION - LOCATION
LOCATION: BACK
LOCATION: OTHER (COMMENT);LEG
LOCATION: BACK;LEG
LOCATION: BACK
LOCATION: LEG

## 2019-06-10 ASSESSMENT — PAIN DESCRIPTION - PAIN TYPE
TYPE: CHRONIC PAIN

## 2019-06-10 ASSESSMENT — PAIN DESCRIPTION - DIRECTION
RADIATING_TOWARDS: LEGS
RADIATING_TOWARDS: LEGS

## 2019-06-10 NOTE — CARE COORDINATION
DISCHARGE BARRIERS  6/10/19, 2:37 PM    Reason for Referral: Home health  Mental Status: alert and oriented  Decision Making: Yes  Family/Social/Home Environment: Spoke with patient. He resides alone in [facility] and reports independence at home. Brother also present during assessment and stated family will assist with cleaning for patient when he is not feeling well. Patient is wheelchair bound. Current Services: None  Current Equipment: See above  Payment Source: Medicare and Medicaid  Concerns or Barriers to Discharge: None noted  Collabrative List of ECF/HH were provided: Yes    Teach Back Method used with patient regarding care plan and discharge plans. Patient and brother verbalize understanding of the plan of care and contribute to goal setting. Anticipated Needs/Discharge Plan: home alone with new HH. List provided. Brother inquired about [facility] assisting with cleaning when patient did not feel well and SW explained the limitations of HH. Brother stated that patient is able to manage everything when he feels well and that family can assist when patient is unable to manage.      Electronically signed by HANY Regan, CARMEN on 6/10/2019 at 2:37 PM

## 2019-06-10 NOTE — CONSULTS
Inpatient consult to Neurology  Consult performed by: Chandra Blount MD  Consult ordered by: Kanika Her MD          NEUROLOGY CONSULT NOTE      Requesting Physician: Kanika Her MD    Reason for Consult:  Evaluate for slurred speech, confusion. History of Present Illness:  Whitney Escamilla is a 52 y.o. male admitted to [address] [place] [address] on 6/9/2019.  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 ? Apparently, had revision of an artificial urinary sphincter done about three weeks back. ? For the last two days, mother who calls him often noticed that he was not talking right. ?This morning again she called him and noticed that he did not sound right on the phone and hence had another son check on him. ? He was noted to have slurred speech and hence he was brought to the hospital by EMS. ? Workup in the ER including CT of head and neck did not show any acute process. ?The patient did have revision of the artificial urinary sphincter done on 05/09/2019. He also in the process of getting his urinary sphincter programmed in a week. No headache. He was found to have UTI in ER. Reports no chest pain. No shortness of breath with exertion. Reports no neck pain. No dysphagia. No fever. No rash. No weight loss. No history of seizure. Patient is poor historian, history obtained from review of medical record.        Past Medical History:        Diagnosis Date    Arthritis     Chondromatosis     lower spine tumor    Depression     GERD (gastroesophageal reflux disease)     Hypertension     Insomnia     Lower paraplegia (HCC)     ankles down    Meningitis spinal     at 8years old    PONV (postoperative nausea and vomiting)            Procedure Laterality Date    ABDOMEN SURGERY      ANUS SURGERY N/A 5/9/2019    REVISION OF ARTIFICAL URINARY SPHINCTER USING A DOUBLE CUFF, WITH CYSTOSCOPY performed by Alee Banuelos MD at [facility]      sphincter augmentation   ARTIFICIAL SPHINCTER MRI SAFE 1.5T    COLONOSCOPY      COLOSTOMY      DILATATION, ESOPHAGUS      ENDOSCOPY, COLON, DIAGNOSTIC      FRACTURE SURGERY      NEPHROLITHOTOMY Left 8/15/2017    LEFT PERCUTANEOUS NEPHROLITHOTRIPSY performed by Alee Banuelos MD at [address] [facility] [address]  2002    skin flap bottom    SPINE SURGERY      Multiple    TENDON RELEASE Bilateral     feet    URETER SURGERY      replaced on left as kid    URETHRA SURGERY  2014    REVISION OF URETHRAL SPHINCTER       Allergies:     Allergies   Allergen Reactions    Asa Buff (Mag [Buffered Aspirin]      Tongue swelling    Prednisone      Insomnia, confusion, restless,    Tape [Adhesive Tape] Rash        Current Medications:     amitriptyline (ELAVIL) tablet 75 mg Nightly   fentaNYL (DURAGESIC) 25 MCG/HR 1 patch Q72H   gabapentin (NEURONTIN) capsule 800 mg TID   oxybutynin (DITROPAN) tablet 5 mg TID   oxyCODONE-acetaminophen (PERCOCET)  MG per tablet 1 tablet Q8H PRN   pantoprazole (PROTONIX) tablet 40 mg BID   potassium (CARDIAC) replacement protocol RX Placeholder   cefTRIAXone (ROCEPHIN) 1 g IVPB in 50 mL D5W minibag Q24H        Social History:  Social History     Tobacco Use   Smoking Status Former Smoker    Packs/day: 1.00    Last attempt to quit: 2013    Years since quittin.8   Smokeless Tobacco Never Used   Tobacco Comment    using vapor no nicotine     Social History     Substance and Sexual Activity   Alcohol Use Yes    Comment: rarely     Social History     Substance and Sexual Activity   Drug Use No         Family History:       Problem Relation Age of Onset    Diabetes Mother     High Blood Pressure Mother     Kidney Disease Mother     Cancer Neg Hx     Stroke Neg Hx     Heart Disease Neg Hx        Review of Systems:  All systems reviewed are negative except what is mentioned in history of present illness. Physical Exam:  /85   Pulse 76   Temp 97.6 °F (36.4 °C) (Oral)   Resp 14   Ht 5' 9\" (1.753 m)   Wt 138 lb 8 oz (62.8 kg)   SpO2 96%   BMI 20.45 kg/m²  I Body mass index is 20.45 kg/m². I Wt Readings from Last 1 Encounters:   06/10/19 138 lb 8 oz (62.8 kg)         General appearance - alert, well appearing, and in no distress, oriented to person, place, and time and normal weight, he is laying in bed, the room is dark. Mental status -Level of Alertness: awake  Orientation: person, place, time  Memory: normal  Fund of Knowledge: normal  Attention/Concentration: normal  Language: dysarthric, he is slow with his responses. Mood is flat  Neck - supple, no significant adenopathy, carotids upstroke normal bilaterally, no carotid bruits. There is no LAP in the neck. There is no thyroid enlargement. Neurological -   Cranial Evmasx-IS-HVA:   Cranial nerve II: Normal. There is full visual fields  Cranial nerve III: Pupils: equal, round, reactive to light   Cranial nerves III, IV, VI: Extraocular Movements: intact   Cranial nerve V: Facial sensation: intact   Cranial nerve VII:Facial strength: intact   Cranial nerve VIII: Hearing: intact   Cranial nerve IX: Palate Elevation intact bilaterally  Cranial nerve XI: Shoulder shrug intact bilaterally  Cranial nerve XII: Tongue midline   neck supple without rigidity  DTR's are decreased?distal and symmetric  Babinski sign is negative? on bilaterally. Motor exam is 5/5 in the upper and 4/5 in lower extremities. Reduced tone in bilateral lower extremities. There is muscle atrophy in bilateral lower extremities. He has flexion of his toes bilaterally. There is no muscle fasciculation   Sensory is intact forlight touch? Coordination: finger to nose intact  Gait and station not tested  Abnormal movement none.   proprioception normal   Skin - no rashes or lesions  Superficial temporal artery myelomalacia. 3. Multilevel degenerative changes are present throughout the cervical spine and are further discussed by level in the findings. These are most significant at C5-6 where there is a disc osteophyte complex causing moderate spinal canal narrowing. Uncovertebral joint spurring and facet arthropathy is also present and there is severe neural foraminal narrowing on the right and moderate to severe neural foraminal narrowing on the left. **This report has been created using voice recognition software. It may contain minor errors which are inherent in voice recognition technology. **    Final report electronically signed by Dr. Eileen Ruelas on 11/7/2018 7:26 AM     No results found for this or any previous visit. Results for orders placed during the hospital encounter of 06/09/19   CTA HEAD W WO CONTRAST    Narrative PROCEDURE: CTA HEAD W WO CONTRAST, CTA NECK W WO CONTRAST    CLINICAL INFORMATION: Slurred speech. COMPARISON: No prior study. TECHNIQUE: 1 mm axial images were obtained through the head and neck after the fast bolus administration of contrast. A noncontrast localizer was obtained. 3-D reconstructions were performed on a dedicated 3-D workstation. These include multiplanar MPR   images and multiplanar MIP images. Centerline reconstructions were obtained of the carotid systems. Isovue intravenous contrast was given. All CT scans at this facility use dose modulation, iterative reconstruction, and/or weight-based dosing when appropriate to reduce radiation dose to as low as reasonably achievable. FINDINGS:      CTA NECK:    Aortic arch and branches: The aortic arch is normal. There is no stenosis at the origin innominate artery, left common carotid artery or either subclavian artery. Right common carotid artery/ICA:  The right common carotid artery is normal. The right carotid bulb and right internal carotid artery are normal. There is no stenosis or atherosclerosis. Left common carotid artery/ICA: The left common carotid artery is normal. The left carotid bulb and left internal carotid artery are normal. There is no stenosis or atherosclerosis. Vertebral arteries: The vertebral arteries are normal. The left vertebral artery is dominant. CTA HEAD:      Internal carotid arteries: Normal. The ophthalmic artery origins are also normal.    Middle cerebral arteries: Normal. The proximal branches are also normal.    Anterior cerebral arteries: Normal. The proximal branches are normal. There is a normal small anterior communicating artery. Vertebral arteries: The vertebral arteries are normal.  The left vertebral artery is dominant. The right vertebral artery ends as a posterior inferior cerebellar artery. Basilar artery: Normal.    Superior cerebellar arteries: Normal.    Posterior cerebral arteries: Normal. The proximal branches are also normal.    There is a normal right posterior communicating artery. No aneurysms, stenoses or occlusions are noted. The superior sagittal sinus, vein of Hamzah, internal cerebral veins, straight sinus, transverse sinuses and sigmoid sinuses are patent. Axial source data: There is a right upper lobe mild patchy opacity which could represent a site of aspiration or early infiltrate. There is no cervical adenopathy. There are no gross abnormalities in the brain. The paranasal sinuses are normally aerated. There are degenerative changes in the cervical spine. Impression    1. Normal vasculature arterial of the head and neck. 2. Early right upper lobe infiltrate versus a site of aspiration or inflammation. **This report has been created using voice recognition software. It may contain minor errors which are inherent in voice recognition technology. **    Final report electronically signed by Dr. Robby Campa on 6/9/2019 2:04 PM     Results for orders placed during the hospital encounter of 06/09/19   CTA NECK W WO CONTRAST    Narrative PROCEDURE: CTA HEAD W WO CONTRAST, CTA NECK W WO CONTRAST    CLINICAL INFORMATION: Slurred speech. COMPARISON: No prior study. TECHNIQUE: 1 mm axial images were obtained through the head and neck after the fast bolus administration of contrast. A noncontrast localizer was obtained. 3-D reconstructions were performed on a dedicated 3-D workstation. These include multiplanar MPR   images and multiplanar MIP images. Centerline reconstructions were obtained of the carotid systems. Isovue intravenous contrast was given. All CT scans at this facility use dose modulation, iterative reconstruction, and/or weight-based dosing when appropriate to reduce radiation dose to as low as reasonably achievable. FINDINGS:      CTA NECK:    Aortic arch and branches: The aortic arch is normal. There is no stenosis at the origin innominate artery, left common carotid artery or either subclavian artery. Right common carotid artery/ICA: The right common carotid artery is normal. The right carotid bulb and right internal carotid artery are normal. There is no stenosis or atherosclerosis. Left common carotid artery/ICA: The left common carotid artery is normal. The left carotid bulb and left internal carotid artery are normal. There is no stenosis or atherosclerosis. Vertebral arteries: The vertebral arteries are normal. The left vertebral artery is dominant. CTA HEAD:      Internal carotid arteries: Normal. The ophthalmic artery origins are also normal.    Middle cerebral arteries: Normal. The proximal branches are also normal.    Anterior cerebral arteries: Normal. The proximal branches are normal. There is a normal small anterior communicating artery. Vertebral arteries: The vertebral arteries are normal.  The left vertebral artery is dominant. The right vertebral artery ends as a posterior inferior cerebellar artery.     Basilar artery: Normal.    Superior cerebellar arteries: Normal.    Posterior cerebral arteries: Normal. The proximal branches are also normal.    There is a normal right posterior communicating artery. No aneurysms, stenoses or occlusions are noted. The superior sagittal sinus, vein of Hamzah, internal cerebral veins, straight sinus, transverse sinuses and sigmoid sinuses are patent. Axial source data: There is a right upper lobe mild patchy opacity which could represent a site of aspiration or early infiltrate. There is no cervical adenopathy. There are no gross abnormalities in the brain. The paranasal sinuses are normally aerated. There are degenerative changes in the cervical spine. Impression    1. Normal vasculature arterial of the head and neck. 2. Early right upper lobe infiltrate versus a site of aspiration or inflammation. **This report has been created using voice recognition software. It may contain minor errors which are inherent in voice recognition technology. **    Final report electronically signed by Dr. Robel Kiser on 6/9/2019 2:04 PM     Reviewed   Results for orders placed during the hospital encounter of 06/09/19   CT HEAD WO CONTRAST    Narrative PROCEDURE: NONCONTRAST CT BRAIN    CLINICAL INFORMATION: Slurred speech    COMPARISON: 4/11/2019    TECHNIQUE: Multiple axial 5 mm images of the brain were obtained without administration of intravenous contrast material. ALL CT SCANS AT THIS FACILITY use dose modulation, iterative reconstruction, and/or weight-based dosing when appropriate to reduce   radiation dose to as low as reasonably achievable. FINDINGS:    Lateral, third, fourth ventricles: Normal in size, contour, position. .. Parenchyma:  No acute infarction, mass lesion, or intracranial hemorrhage is seen. Mastoid processes: Well aerated. Normal in appearance. .       Paranasal sinuses/calvarium: Unremarkable        Impression Normal noncontrast CT of the brain.  No acute intracranial

## 2019-06-10 NOTE — PROGRESS NOTES
[facility] 60  INPATIENT OCCUPATIONAL THERAPY  [facility] NEUROSCIENCES 4A  EVALUATION    Time:  Time In: 4026  Time Out: 1220  Timed Code Treatment Minutes: 8 Minutes  Minutes: 23          Date: 6/10/2019  Patient Name: Malinda Fernandes,   Gender: male      MRN: [id number]  : 1970  (52 y.o.)  Referring Practitioner: Dr. Dionisio Farah  Diagnosis: slurred speech  Additional Pertinent Hx: Per ER note on 19: 52 y.o. male with a past medical history of arthritis, GERD, paraplegia of the BLE, HTN, and depression. The patient presents to the ED for evaluation of slurred speech. Last known well was approximately 17:00 Friday evening. Patient states that he waited at home to see if the symptoms improved. He has noticed some weakness on the right as well.      Restrictions/Precautions:  Fall Risk       Past Medical History:   Diagnosis Date    Arthritis     Chondromatosis     lower spine tumor    Depression     GERD (gastroesophageal reflux disease)     Hypertension     Insomnia     Lower paraplegia (HCC)     ankles down    Meningitis spinal     at 8years old    PONV (postoperative nausea and vomiting)      Past Surgical History:   Procedure Laterality Date    ABDOMEN SURGERY      ANUS SURGERY N/A 2019    REVISION OF ARTIFICAL URINARY SPHINCTER USING A DOUBLE CUFF, WITH CYSTOSCOPY performed by Sonia Ellsworth MD at [address] [place] [address]      sphincter augmentation   ARTIFICIAL SPHINCTER MRI SAFE 1.5T    COLONOSCOPY      COLOSTOMY      DILATATION, ESOPHAGUS      ENDOSCOPY, COLON, DIAGNOSTIC      FRACTURE SURGERY      NEPHROLITHOTOMY Left 8/15/2017    LEFT PERCUTANEOUS NEPHROLITHOTRIPSY performed by Sonia Ellsworth MD at [address] [place] [address]    skin flap bottom    SPINE SURGERY      Multiple    TENDON RELEASE Bilateral     feet    URETER SURGERY      replaced on left as kid    URETHRA SURGERY  2014    REVISION OF URETHRAL SPHINCTER           Subjective  Patient assessed for rehabilitation services?: Yes  Family / Caregiver Present: No    Subjective: cooperative    General:   Vision: Within Functional Limits(has reading glasses)    Hearing: Within functional limits         Pain:  Pain Assessment  Patient Currently in Pain: Yes(BLE reports it is chronic-no number given)       Social/Functional History:  Type of Home: Facility  Home Layout: One level  Home Access: Ramped entrance  Home Equipment: Wheelchair-manual, Wheelchair-electric(adjustable bed)     Bathroom Shower/Tub: Walk-in shower  Bathroom Toilet: Handicap height  Bathroom Equipment: Shower chair       ADL Assistance: Independent  Homemaking Assistance: Independent       Ambulation Assistance: (non ambulator)  Transfer Assistance: (stand-pivot to w/c)    Active : Yes     Additional Comments: Pt is w/c bound reports indep with ADLs & IADLs PLOF. Son & mother live nearby. Objective        Overall Cognitive Status: Exceptions(mildly decreased insight)         Sensation  Overall Sensation Status: WFL                  LUE AROM (degrees)  LUE AROM : WFL          RUE AROM (degrees)  RUE AROM : WFL       LUE Strength  Gross LUE Strength: WFL                RUE Strength  Gross RUE Strength: WFL   ADL  LE Dressing: Stand by assistance(slipper socks)     Bed mobility  Supine to Sit: Stand by assistance    Transfers  Stand Pivot Transfers: Minimal assistance(1/2 stand)    Balance  Sitting Balance: Supervision              Activity Tolerance:  Activity Tolerance: Patient limited by fatigue    Treatment Initiated:  Pt donned PJ pants while seated EOB then leaned posteriorly to pull up-vcs for safety. Difficulty with technique, although Pt not very receptive to suggestions. Pt impulsively t/fs self into recliner-afterwards reported \"I did not realize how weak I was\". Therapist encouraged Pt to slow down & wait for A.      Assessment:  Assessment: Pt demo decreased ADL & functional mobility status over PLOF. Continued OT recommended to educate Pt on safety with returning to ADLs at home. Performance deficits / Impairments: Decreased functional mobility , Decreased ADL status, Decreased safe awareness  Prognosis: Good    Clinical Decision Making: Clinical Decision making was of Moderate Complexity as the result of analysis of data from a detailed assessment, a consideration of several treatment options, the presence of comorbidities affecting the plan of care and the need for minimal to moderate modifications or assistance required to complete the evaluation. Discharge Recommendations:  Continue to assess pending progress    Patient Education:  Patient Education: OT role, POC, safety with mobility  Barriers to Learning: decreased insight    Equipment Recommendations:  Equipment Needed: No    Safety:  Safety Devices in place: Yes  Type of devices: All fall risk precautions in place, Call light within reach, Chair alarm in place       Plan:  Times per week: 5x  Current Treatment Recommendations: Balance Training, Functional Mobility Training, Safety Education & Training, Self-Care / ADL    Goals:  Patient goals : go home    Short term goals  Time Frame for Short term goals: 2 weeks  Short term goal 1: Complete stand-pivot t/fs to various surfaces including BSC & w/c  Short term goal 2: Complete LE dressing with S & 0-2 vcs for safety  Long term goals  Time Frame for Long term goals : No LTG set d/t short ELOS    Evaluation Complexity: Based on the findings of patient history, examination, clinical presentation, and decision making during this evaluation, this patient is of medium complexity.

## 2019-06-10 NOTE — PROGRESS NOTES
Consulted to see patient for colostomy. Patient is off the floor at this time. Spoke with the patients primary nurse and she states the patient has trained his bowels and does not wear an appliance. The patient keeps a gauze over his stoma. Nurse states there isn't any skin issues at this time. Wound care will sign off and primary nurse can consult if any new issues arise.

## 2019-06-10 NOTE — PROGRESS NOTES
[address] [place] [address]  Speech - Language - Cognitive Evaluation    SLP Individual Minutes  Time In: 2657  Time Out: 6474  Minutes: 16  Timed Code Treatment Minutes: 0 Minutes       Date: 6/10/2019  Patient Name: Ramya Lisa MRN: [id number]    : 1970  (48 y.o.)  Gender: male   Referring Physician:  Saint Hurst, MD  Diagnosis: Slurred speech  Secondary Diagnosis:  Cognitive deficits, dysarthria  Diet:  Regular with thin liquids    History of Present Illness/Injury: Pt admitted to [facility] with above med dx; please refer to physician H&P for full details. 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 The patient can live independently. Apparently, had revision of an artificial urinary sphincter done about three weeks back. For the last two days, mother who calls him often noticed that he was not talking right. This morning again she called him and noticed that he did not sound right on the phone and hence had another son check on him. He was noted to have slurred speech and hence he was brought to the hospital by EMS. Workup in the ER including CT of head and neck did not show any acute process. The patient did have revision of the artificial urinary sphincter done on 2019. He denies any dysuria. The patient also has a colostomy, but does not have a bag and he does have dressing and he is able to control his bowel movement. He also in the process of getting his urinary sphincter programmed in a week. He was confused per mother. There was no headache, no blurred vision. No chest pain or difficulty breathing\". ST consulted to assess cognitive linguistic skills and develop goals per ST POC as indicated.        Past Medical History:   Diagnosis Date date  Orientation: 3/6  Immediate recall: 0/5  Short term recall: 0/5  Divergent namin items named within 1 minute time frame (target=11)  Problem solvin/3  Reasonin/2 verbal  Sequencin/1  Thought organization: Impaired  Insight: Poor   Attention: Impaired sustained attention   Numerical relations: 0/5 serial subtraction     SWALLOWING:  JESSICA Bolaños reports safe toleration for established diet level of regular with thin liquids. Pt with consumption of banana within evaluation with NO difficulty detected and no s/s aspiration exhibited. Will monitor swallow function as appropriate. IMPRESSIONS: Pt presents with a severe cognitive deficit given impaired temporal and spatial orientation, immediate/delayed recall, working memory, problem solving, moderately complex reasoning, thought organization, processing speed, and mental flexibility. Diminished sustained attention with poor focus/concentration detected despite implementation of low stim setting. Expressive and receptive language domains grossly intact with no apparent communicative breakdowns. Dysarthric tendencies exhibited due to imprecise articulatory placement and presence of blended word boundaries; speech intelligibility approximates 85% in known contexts. No dysphagia or dysphonia. HIGHLY recommend skilled ST services targeting the above impairments to obtain optimal level of functionality for re-integration into home setting. **concerns conveyed to JESSICA Bolaños re: pt return to home setting independently; pt to REQUIRE assistance within home setting due to severity of cognitive deficits detected    EDUCATION:   Learner: [x]Patient [] Significant other [] Son/Daughter [] Parent     [x] Other: Family friend   Education: [x] Reviewed results and recommendations of this evaluation     [] Reviewed diet and strategies     [] Reviewed signs, symptoms and risk of aspiration     [] Demonstrated how to thick liquid appropriately.      [x] Reviewed

## 2019-06-10 NOTE — H&P
mentioned that he had  multiple surgeries for his bowels, lower extremity and for his bladder. Also mentioned was esophageal stricture dilatation. Then, recent  surgery included revision of his artificial urethral sphincter. ALLERGIES:  Listed were ASPIRIN, PREDNISONE, TAPE. HOME MEDICATIONS:  List had Percocet, Elavil, Protonix, Ditropan,  Ultram, Neurontin, Duragesic. FAMILY HISTORY:  Significant for diabetes, hypertension, kidney disease. SOCIAL HISTORY:  He lost his wife about four years back. He has one son  and one grandchild. He quit smoking vapor e-cigarettes over few years  back. He is positive for marijuana. Recent alcohol use. REVIEW OF SYSTEMS:  Positive for confusion, slurred speech and not  acting right per mother. He was oriented to person, place, and time at  the time of examination. He does have urinary incontinence because of  recent revision of his artificial urinary sphincter. No diarrhea. No  unusual bleeding. No chest pain or palpitations. He is wheelchair  bound, but able to transfer himself from bed to the chair. PHYSICAL EXAMINATION:  VITAL SIGNS:  Blood pressure was 143/89, pulse of 84, respirations 18,  temperature of 97.6. HEENT:  Pupils are reacting to light. Tongue is moist.  Buccal mucosa  is moist.  No angle of deviation of mouth noted. NECK:  Supple. HEART:  S1, S2 heard. Regular. LUNGS:  Air exchange is appreciated bilaterally. ABDOMEN:  Soft. Bowel sounds heard. Ostomy dressing in place. Surgical scars on his bilateral groin noted. No significant scrotal  edema noted, but does have some edema. NEUROLOGIC:  He is oriented to person, place, and time. Hand  is  equal.  He does have significant atrophy of both lower extremities. LABORATORY DATA:  WBC of 9.5, hemoglobin 10.7, hematocrit of 33.3, and  platelets of 680. Sodium was 143, potassium of 3.4, chloride of 110,  CO2 of 21, BUN of 9, creatinine of 0.9. Troponin was 0.010. Urine drug  screen is positive for cannabinoids. DIAGNOSTIC DATA:  CTA, normal vasculature. CT of the chest, possible  early right upper lobe infiltrate versus aspiration or inflammation. Chest x-ray not done. IMPRESSION:  1. Slurred speech. 2.  UTI. 3.  Questionable infiltrate noted versus inflammation around his right  upper lobe. 4.  Anemia, appears chronic. 5.  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 6.  Hypokalemia. RECOMMENDATION:  1. We will obtain MRI of the brain. Neurology has been consulted. 2.  Empiric antibiotics with Rocephin. Await urine culture results. 3.  We will check a chest x-ray and follow up the lesion. 4.  Replace potassium per protocol.         Demond Melendez M.D.    D: 06/09/2019 17:47:17       T: 06/09/2019 19:32:23     [email or web address]  Job#: [id number]     Doc#: [id number]    CC:

## 2019-06-10 NOTE — PLAN OF CARE
Problem: Pain:  Goal: Pain level will decrease  Description  Pain level will decrease  6/10/2019 0203 by Master Paredes RN  Outcome: Ongoing  Note:   Pt states that he has chronic pain at home and takes his pain medication as scheduled for the pain. Pt provided with pillow support for legs. Repositions self, encouraged to shift weight frequently. Problem: Pain:  Goal: Control of chronic pain  Description  Control of chronic pain  6/10/2019 0203 by Master Paredes RN  Outcome: Ongoing  Note:   Pt pain relieved for periods of time with prescribed medications. Pt encouraged to shift weight frequently to prevent breakdown of skin. Pillow support provided to pt. Problem: Falls - Risk of:  Goal: Will remain free from falls  Description  Will remain free from falls  6/10/2019 0203 by Master Paredes RN  Outcome: Ongoing  Note:   Bed in lowest position with wheels locked. Bed alarm being utilized and pt oriented to call light frequently for any needs. Problem: Risk for Impaired Skin Integrity  Goal: Tissue integrity - skin and mucous membranes  Description  Structural intactness and normal physiological function of skin and  mucous membranes. 6/10/2019 0203 by Master Paredes RN  Outcome: Ongoing  Note:   Barrier cream being applied to bottom to prevent breakdown. External cath placed on pt d/t incontinence to prevent breakdown. Pt encouraged PO fluids d/t passing swallow eval to promote hydration and maintain tissue integrity. Problem: HEMODYNAMIC STATUS  Goal: Patient has stable vital signs and fluid balance  6/10/2019 0203 by Master Paredes RN  Outcome: Ongoing  Note:      06/09/19 1915   Vital Signs   Temp 98.3 °F (36.8 °C)   Temp Source Oral   Pulse 88   Heart Rate Source Monitor   Resp 18   BP (!) 156/89   BP Location Right upper arm   BP Upper/Lower Upper   MAP (mmHg) 115   Level of Consciousness 0   MEWS Score 1   Patient Currently in Pain Yes   VSS at this time, will continue to monitor. Problem: ACTIVITY INTOLERANCE/IMPAIRED MOBILITY  Goal: Mobility/activity is maintained at optimum level for patient  6/10/2019 0203 by Wicho Rodrigues RN  Outcome: Ongoing  Note:   Pt encouraged to work with therapies available. Pt uses w/c at home but chair is unavailable at this time. Pt encouraged to continue doing strengthening while in bed to prevent deconditioning of upper body. Problem: COMMUNICATION IMPAIRMENT  Goal: Ability to express needs and understand communication  6/10/2019 0203 by Wicho Rodrigues RN  Outcome: Ongoing  Note:   Pt is vocal about his desire to be d/c but states that he does want to know what is wrong with him. Pt denies needing anything upon his d/c while at home. Problem: Discharge Planning:  Goal: Participates in care planning  Description  Participates in care planning  6/10/2019 0203 by Wicho Rodrigues RN  Outcome: Ongoing  Note:   Discharge planning is ongoing at this time. Pt plans to return home and denies any needs upon d/c. Care plan reviewed with patient. Patient verbalized understanding of the plan of care and contribute to goal setting.

## 2019-06-10 NOTE — PLAN OF CARE
Problem: Pain:  Goal: Pain level will decrease  Description  Pain level will decrease  Outcome: Ongoing  Note:   Pt's stated pain goal is \"5\". Pt has chronic back pain. Pt administered percocet 1 tab q 8 hrs prn and satisfied with pain relief at this time. Problem: Pain:  Goal: Control of chronic pain  Description  Control of chronic pain  Outcome: Ongoing  Note:   Pt's stated pain goal is \"5\". Pt has chronic back pain. Pt administered percocet 1 tab q 8 hrs prn and satisfied with pain relief at this time. Problem: Falls - Risk of:  Goal: Will remain free from falls  Description  Will remain free from falls  Outcome: Ongoing  Note:   Pt has colostomy and urinary sphincter. Bed alarm on, bed in lowest position. Pt normally uses wheelchair to transfer at home. Pt working with therapies. Pt weak with ambulation; pt paraplegic from bilateral ankles down     Problem: Risk for Impaired Skin Integrity  Goal: Tissue integrity - skin and mucous membranes  Description  Structural intactness and normal physiological function of skin and  mucous membranes. Outcome: Ongoing  Note:   Pt has colostomy, incision on scrotum and pelvis incision. Incisions open to air at this time. Pt has redness on bilateral heels that blanches. Pt turns self. Problem: HEMODYNAMIC STATUS  Goal: Patient has stable vital signs and fluid balance  Outcome: Ongoing  Note:           06/10/19 1301   Vital Signs   Temp 97.6 °F (36.4 °C)   Temp Source Oral   Pulse 80   Heart Rate Source Monitor   Resp 16   /75   BP Location Left Arm   BP Upper/Lower Upper   MAP (mmHg) 93   Oxygen Therapy   SpO2 99 %   O2 Device None (Room air)   Pt's VS stable at this time. Continuing to monitor pt's blood pressure. Problem: ACTIVITY INTOLERANCE/IMPAIRED MOBILITY  Goal: Mobility/activity is maintained at optimum level for patient  Outcome: Ongoing  Note:   Pt has active movement in bilateral upper and lower extremities.  Pt is paraplegic from ankle down in bilateral lower extremities. Problem: COMMUNICATION IMPAIRMENT  Goal: Ability to express needs and understand communication  Outcome: Ongoing  Note:   Pt alert and oriented x 4 at this time. Pt has dysarthria and expressive aphasia at times. Continuing to monitor. Problem: Discharge Planning:  Goal: Participates in care planning  Description  Participates in care planning  Outcome: Ongoing  Note:   Pt from private residence. Discharge plans remain in progress at this time. Care plan reviewed with patient.   Patient verbalizes understanding of the plan of care and contributes to goal setting at this time

## 2019-06-10 NOTE — PROGRESS NOTES
[address] [address]  INPATIENT PHYSICAL THERAPY  EVALUATION  [facility] 4A - 4A-03/003-A    Time In: 3174  Time Out: 5486  Timed Code Treatment Minutes: 12 Minutes  Minutes: 29          Date: 6/10/2019  Patient Name: Bernie Martinez,  Gender:  male        MRN: [id number]  : 1970  (52 y.o.)      Referring Practitioner: Dr. Manny Simpson  Diagnosis: slurred speech  Additional Pertinent Hx: admit with above diagnosis, UTI, hx paraplegia following spinal meningitis from spinal surgery for his lipoma as child     Past Medical History:   Diagnosis Date    Arthritis     Chondromatosis     lower spine tumor    Depression     GERD (gastroesophageal reflux disease)     Hypertension     Insomnia     Lower paraplegia (HCC)     ankles down    Meningitis spinal     at 8years old    PONV (postoperative nausea and vomiting)      Past Surgical History:   Procedure Laterality Date    ABDOMEN SURGERY      ANUS SURGERY N/A 2019    REVISION OF ARTIFICAL URINARY SPHINCTER USING A DOUBLE CUFF, WITH CYSTOSCOPY performed by Link Jordan MD at [facility]      sphincter augmentation   ARTIFICIAL SPHINCTER MRI SAFE 1.5T    COLONOSCOPY      COLOSTOMY      DILATATION, ESOPHAGUS      ENDOSCOPY, COLON, DIAGNOSTIC      FRACTURE SURGERY      NEPHROLITHOTOMY Left 8/15/2017    LEFT PERCUTANEOUS NEPHROLITHOTRIPSY performed by Link Jordan MD at [facility] [address]    skin flap bottom    SPINE SURGERY      Multiple    TENDON RELEASE Bilateral     feet    URETER SURGERY      replaced on left as kid    URETHRA SURGERY  2014    REVISION OF URETHRAL SPHINCTER       Restrictions/Precautions:  Fall Risk       Subjective:  Chart Reviewed: Yes  Patient assessed for rehabilitation services?: Yes  Family / Caregiver Present: No  Subjective: cooperative with encouragement, per pt has been weaker since Aug 2018 but is I in home to transfer to w/c    General:       Vision: Within Functional Limits(has reading glasses)    Hearing: Within functional limits         Pain:  Yes. Pain Assessment  Pain Level: 7(chronic back pain into BLE)       Social/Functional History:    Type of Home: Facility  Home Layout: One level  Home Access: Ramped entrance  Home Equipment: Wheelchair-manual, Wheelchair-electric(adjustable bed)   Bathroom Shower/Tub: Walk-in shower  Bathroom Toilet: Handicap height  Bathroom Equipment: Shower chair  ADL Assistance: Independent  Homemaking Assistance: Independent  Ambulation Assistance: (non ambulator)  Transfer Assistance: (stand-pivot to w/c)    Active : Yes     Additional Comments: Pt is w/c bound reports indep with ADLs & IADLs PLOF. Son & mother live nearby. Objective:       RLE AROM: WFL  RLE General AROM: P/AAROM knee WFL, PROM ankle WFL, AROM Hip WFL    LLE AROM : WFL  LLE General AROM: P/AAROM knee WFL, PROM ankle WFL, AROM Hip WFL      Strength RLE: Exception  Comment: hip 3/5, knee 1 to 2-/5, ankle 0/5    Strength LLE: Exception  Comment: hip 3/5, knee 1 to 2-/5, ankle 0/5      Balance  Sitting - Static: Good  Sitting - Dynamic: Fair  Standing - Static: Poor    Rolling to Left: Modified independent  Rolling to Right: Modified independent  Supine to Sit: Stand by assistance  Sit to Supine: Stand by assistance  Scooting: Stand by assistance  Comment: HOB up 30 degrees and use BR    Transfers  Bed to Chair: Contact guard assistance(pivot with BUE support on furniture)      Exercises:  Comments: none          Activity Tolerance:  Activity Tolerance: Patient Tolerated treatment well    Treatment Initiated: rolling L/R on bed and bridges to adjust clothing with MOD I. Sit pivot transfer bedside chair to edge of bed with CGA and pt BUE support on furniture. Assessment:   Body structures, Functions, Activity limitations: Decreased functional mobility , Decreased strength, Decreased balance, Increased Pain  Assessment: pt tolerated fair, pt with min confusion, pt stated generalized weakness since Aug 2018, encouragement for mobility, primarily CGA to SBA for safe mobility, recommend cont PT to inc pt I with functional mobility  Prognosis: Good    Clinical Presentation: Moderate - Evolving with Changing Characteristics: see assessment    Decision Making: High Complexitybased on patient assessment and decision making process of determining plan of care and establishing reasonable expectations for measurable functional outcomes    REQUIRES PT FOLLOW UP: Yes    Discharge Recommendations:  Discharge Recommendations: Continue to assess pending progress, Home with Home health PT, Patient would benefit from continued therapy after discharge    Patient Education:  Patient Education: POC    Equipment Recommendations:  Equipment Needed: No  Other: cont to assess needs    Safety:  Type of devices: All fall risk precautions in place, Bed alarm in place, Gait belt, Call light within reach, Patient at risk for falls, Left in bed, Nurse notified    Plan:  Times per week: 5X GM  Times per day: Daily  Specific instructions for Next Treatment: therex, bed mobility, pivot transfers, w/c mobility    Goals:  Patient goals : feel stronger  Short term goals  Time Frame for Short term goals: by discharge  Short term goal 1: bed mobility with MOD I, may raise HOB  Short term goal 2: pivot transfers bed to/from w/c MOD I  Short term goal 3: w/c mobility with MOD I for safe manueverability in home  Long term goals  Time Frame for Long term goals : no LTGs set secondary to short ELOS    Evaluation Complexity: Based on the findings of patient history, examination, clinical presentation, and decision making during this evaluation, the evaluation of Celia Overcast.  is of medium complexity.             AM-PAC Inpatient Mobility without Stair Climbing Raw Score : 12  AM-PAC Inpatient without Stair Climbing T-Scale Score : 37.26  Mobility Inpatient CMS 0-100% Score: 63.03  Mobility Inpatient without Stair CMS G-Code Modifier : CL

## 2019-06-10 NOTE — CARE COORDINATION
6/10/19, 7:45 AM      Chad Ramires. Admitted from: ED 6/9/2019/ [address] [place] [address] day: 0   Location: -03/003-A Reason for admit: Slurred speech [R47.81] Status: OBS  Admit order signed?: yes  PMH:  has a past medical history of Arthritis, Chondromatosis, Depression, GERD (gastroesophageal reflux disease), Hypertension, Insomnia, Lower paraplegia ([address] Meningitis spinal, and PONV (postoperative nausea and vomiting). Procedure: EEG pending  Pertinent abnormal Imaging:CXR    Impression:         No acute intrathoracic process. CTA Head Neck  W WO Contrast   Impression:           1. Normal vasculature arterial of the head and neck. 2. Early right upper lobe infiltrate versus a site of aspiration or inflammation. CT Head WO Contrast    Impression:       Normal noncontrast CT of the brain. No acute intracranial process.           Medications:  Scheduled Meds:   amitriptyline  75 mg Oral Nightly    fentaNYL  1 patch Transdermal Q72H    gabapentin  800 mg Oral TID    oxybutynin  5 mg Oral TID    pantoprazole  40 mg Oral BID    potassium (CARDIAC) replacement protocol   Other RX Placeholder    cefTRIAXone (ROCEPHIN) IV  1 g Intravenous Q24H     Continuous Infusions:   Pertinent Info/Orders/Treatment Plan:  IV antibiotics, Neurology consult, telemetry, potassium replacement protocol, neuro checks, PT/OT. Diet: DIET NO SALT ADDED (3-4 GM); Smoking status:  reports that he quit smoking about 5 years ago. He smoked 1.00 pack per day.  He has never used smokeless tobacco.   PCP: Hever Murillo MD  Readmission: no  Readmission Risk Score: 0%    Discharge Planning  Current Residence:  Private Residence  Living Arrangements:  Alone   Support Systems:  Family Members, Friends/Neighbors  Current Services PTA:     Potential Assistance Needed:  N/A  Potential Assistance Purchasing Medications:  Yes  Does patient want to participate in local refill/ meds to beds program?  No  Type of Home Care Services: None  Patient expects to be discharged to:  private residence  Expected Discharge date:  06/11/19  Follow Up Appointment: Best Day/ Time: Monday AM    Discharge Plan: Met with Dustin Hackett. He currently lives at home alone with family support. He is wheelchair bound. Plan is to return home at discharge. He is receptive to [facility] for PT/OT, Nursing. Denies need for DME. SW consult placed.      Evaluation: yes

## 2019-06-11 LAB
ANION GAP SERPL CALCULATED.3IONS-SCNC: 12 MEQ/L (ref 8–16)
BUN BLDV-MCNC: 11 MG/DL (ref 7–22)
CALCIUM SERPL-MCNC: 8.7 MG/DL (ref 8.5–10.5)
CHLORIDE BLD-SCNC: 110 MEQ/L (ref 98–111)
CO2: 21 MEQ/L (ref 23–33)
CREAT SERPL-MCNC: 0.7 MG/DL (ref 0.4–1.2)
FERRITIN: 17 NG/ML (ref 22–322)
GFR SERPL CREATININE-BSD FRML MDRD: > 90 ML/MIN/1.73M2
GLUCOSE BLD-MCNC: 100 MG/DL (ref 70–108)
HOMOCYSTEINE, TOTAL: 10 UMOL/L
IRON: 17 UG/DL (ref 65–195)
POTASSIUM SERPL-SCNC: 4.3 MEQ/L (ref 3.5–5.2)
SODIUM BLD-SCNC: 143 MEQ/L (ref 135–145)
TOTAL IRON BINDING CAPACITY: 274 UG/DL (ref 171–450)
URINE CULTURE REFLEX: NORMAL

## 2019-06-11 PROCEDURE — 6360000002 HC RX W HCPCS: Performed by: INTERNAL MEDICINE

## 2019-06-11 PROCEDURE — 82728 ASSAY OF FERRITIN: CPT

## 2019-06-11 PROCEDURE — 99232 SBSQ HOSP IP/OBS MODERATE 35: CPT | Performed by: PSYCHIATRY & NEUROLOGY

## 2019-06-11 PROCEDURE — 36415 COLL VENOUS BLD VENIPUNCTURE: CPT

## 2019-06-11 PROCEDURE — 87040 BLOOD CULTURE FOR BACTERIA: CPT

## 2019-06-11 PROCEDURE — 2709999900 HC NON-CHARGEABLE SUPPLY

## 2019-06-11 PROCEDURE — 6370000000 HC RX 637 (ALT 250 FOR IP): Performed by: INTERNAL MEDICINE

## 2019-06-11 PROCEDURE — 1200000003 HC TELEMETRY R&B

## 2019-06-11 PROCEDURE — 83550 IRON BINDING TEST: CPT

## 2019-06-11 PROCEDURE — 83540 ASSAY OF IRON: CPT

## 2019-06-11 PROCEDURE — 97127 HC SP THER IVNTJ W/FOCUS COG FUNCJ: CPT

## 2019-06-11 PROCEDURE — 97535 SELF CARE MNGMENT TRAINING: CPT

## 2019-06-11 PROCEDURE — 2060000000 HC ICU INTERMEDIATE R&B

## 2019-06-11 PROCEDURE — 2580000003 HC RX 258: Performed by: INTERNAL MEDICINE

## 2019-06-11 PROCEDURE — 80048 BASIC METABOLIC PNL TOTAL CA: CPT

## 2019-06-11 RX ADMIN — OXYBUTYNIN CHLORIDE 5 MG: 5 TABLET ORAL at 21:39

## 2019-06-11 RX ADMIN — OXYCODONE AND ACETAMINOPHEN 1 TABLET: 10; 325 TABLET ORAL at 08:30

## 2019-06-11 RX ADMIN — SODIUM CHLORIDE: 9 INJECTION, SOLUTION INTRAVENOUS at 13:09

## 2019-06-11 RX ADMIN — GABAPENTIN 800 MG: 400 CAPSULE ORAL at 05:58

## 2019-06-11 RX ADMIN — PANTOPRAZOLE SODIUM 40 MG: 40 TABLET, DELAYED RELEASE ORAL at 08:30

## 2019-06-11 RX ADMIN — CEFTRIAXONE SODIUM 2 G: 2 INJECTION, POWDER, FOR SOLUTION INTRAMUSCULAR; INTRAVENOUS at 21:40

## 2019-06-11 RX ADMIN — OXYCODONE AND ACETAMINOPHEN 1 TABLET: 10; 325 TABLET ORAL at 18:31

## 2019-06-11 RX ADMIN — PANTOPRAZOLE SODIUM 40 MG: 40 TABLET, DELAYED RELEASE ORAL at 21:40

## 2019-06-11 RX ADMIN — HEPARIN SODIUM 5000 UNITS: 5000 INJECTION INTRAVENOUS; SUBCUTANEOUS at 21:40

## 2019-06-11 RX ADMIN — HEPARIN SODIUM 5000 UNITS: 5000 INJECTION INTRAVENOUS; SUBCUTANEOUS at 13:10

## 2019-06-11 RX ADMIN — HEPARIN SODIUM 5000 UNITS: 5000 INJECTION INTRAVENOUS; SUBCUTANEOUS at 05:58

## 2019-06-11 RX ADMIN — GABAPENTIN 800 MG: 400 CAPSULE ORAL at 13:08

## 2019-06-11 RX ADMIN — OXYBUTYNIN CHLORIDE 5 MG: 5 TABLET ORAL at 05:57

## 2019-06-11 RX ADMIN — GABAPENTIN 800 MG: 400 CAPSULE ORAL at 21:39

## 2019-06-11 RX ADMIN — OXYBUTYNIN CHLORIDE 5 MG: 5 TABLET ORAL at 13:08

## 2019-06-11 RX ADMIN — AMITRIPTYLINE HYDROCHLORIDE 75 MG: 25 TABLET, FILM COATED ORAL at 21:39

## 2019-06-11 ASSESSMENT — PAIN DESCRIPTION - LOCATION
LOCATION: BACK
LOCATION: BACK

## 2019-06-11 ASSESSMENT — PAIN DESCRIPTION - DIRECTION
RADIATING_TOWARDS: LEGS
RADIATING_TOWARDS: LEGS

## 2019-06-11 ASSESSMENT — PAIN DESCRIPTION - ONSET
ONSET: ON-GOING
ONSET: ON-GOING

## 2019-06-11 ASSESSMENT — PAIN DESCRIPTION - ORIENTATION
ORIENTATION: RIGHT;LEFT
ORIENTATION: RIGHT;LEFT

## 2019-06-11 ASSESSMENT — PAIN SCALES - GENERAL
PAINLEVEL_OUTOF10: 0
PAINLEVEL_OUTOF10: 7
PAINLEVEL_OUTOF10: 7

## 2019-06-11 ASSESSMENT — PAIN DESCRIPTION - PAIN TYPE
TYPE: CHRONIC PAIN
TYPE: CHRONIC PAIN

## 2019-06-11 ASSESSMENT — PAIN DESCRIPTION - DESCRIPTORS
DESCRIPTORS: CONSTANT
DESCRIPTORS: CONSTANT

## 2019-06-11 ASSESSMENT — PAIN - FUNCTIONAL ASSESSMENT
PAIN_FUNCTIONAL_ASSESSMENT: ACTIVITIES ARE NOT PREVENTED
PAIN_FUNCTIONAL_ASSESSMENT: PREVENTS OR INTERFERES SOME ACTIVE ACTIVITIES AND ADLS

## 2019-06-11 ASSESSMENT — PAIN DESCRIPTION - FREQUENCY
FREQUENCY: CONTINUOUS
FREQUENCY: CONTINUOUS

## 2019-06-11 ASSESSMENT — PAIN DESCRIPTION - PROGRESSION
CLINICAL_PROGRESSION: GRADUALLY WORSENING
CLINICAL_PROGRESSION: GRADUALLY WORSENING

## 2019-06-11 NOTE — PROGRESS NOTES
[address]  Occupational Therapy  Daily Note  Time:    Time In: 1120  Time Out: 0671  Timed Code Treatment Minutes: 9 Minutes  Minutes: 9          Date: 2019  Patient Name: Corinne Mccrary,   Gender: male      Room: [facility]03/003-A  MRN: [id number]  : 1970  (52 y.o.)  Referring Practitioner: Dr. Arabella Hay  Diagnosis: slurred speech  Additional Pertinent Hx: Per ER note on 19: 52 y.o. male with a past medical history of arthritis, GERD, paraplegia of the BLE, HTN, and depression. The patient presents to the ED for evaluation of slurred speech. Last known well was approximately 17:00 Friday evening. Patient states that he waited at home to see if the symptoms improved. He has noticed some weakness on the right as well. Restrictions/Precautions:  Restrictions/Precautions: Fall Risk    SUBJECTIVE: Nurse ok'd bed exs if pt able to participate. Reports pt is confused and experiencing hallucinations. PAIN: pt inconsistent with reporting    COGNITION: confusion, hallucinations, poor safety and insight    ADL:   Grooming: Stand By Assistance. washing face and combing hair while in bed with perseveration noted . ADDITIONAL ACTIVITIES:  Attempted to engage pt in UE exs for UE strengthening without success due to confusion. Pt unable to attend to task    ASSESSMENT:  Activity Tolerance:  Patient tolerance of  treatment: poor.  Pt limited b confusion      Discharge Recommendations: Continue to assess pending progress  Equipment Recommendations: Equipment Needed: No  Plan: Times per week: 5x  Current Treatment Recommendations: Balance Training, Functional Mobility Training, Safety Education & Training, Self-Care / ADL    Patient Education  Patient Education: role of OT, ADLs    Goals  Short term goals  Time Frame for Short term goals: 2 weeks  Short term goal 1: Complete stand-pivot t/fs to various surfaces including BSC & w/c  Short term goal 2: Complete LE dressing with S & 0-2 vcs for safety  Long term goals  Time Frame for Long term goals : No LTG set d/t short ELOS    Following session, patient left in safe position with all fall risk precautions in place.

## 2019-06-11 NOTE — PROGRESS NOTES
[facility] 4A    TIME   SLP Individual Minutes  Time In: 1378  Time Out: 2340  Minutes: 9  Timed Code Treatment Minutes: 9 Minutes       [x]Daily Note  []Progress Note  []Discharge Note    Date: 2019  Patient Name: Yazmin Barbosa. MRN: [id number]    : 1970  (48 y.o.)  Gender: male   Primary Provider: Karin Light MD  Admitting Diagnosis:  Slurred speech   Secondary Diagnosis: Cognitive/lingustic deficits   Precautions: Fall Risk   Swallowing Status/Diet: Regular with thin   Swallowing Strategies: Standard  DATE of last MBS:  N/a   Pain:  0/10     Subjective: Patient seen at bedside. No family present. Patient with fair alertness; heavy eyes with increasing fatigue. Patient with confused, unintelligible, nonsensical language throughout. Patient reaching into space and stating comments related to others in room when no other persons present. SHORT TERM GOAL #1:  Goal 1: Pt will complete immediate, delayed, and working memory tasks with 70% accuracy given mod cues to improve retention of pertinent information. INTERVENTIONS:Orientation:  Name-indep  -max cues provided    Location type-indep  Name of hospital-indep provided additional time  Month-indep  Date-max cues   Time-incorrect despite max cues     SHORT TERM GOAL #2:  Goal 2: Pt will complete problem solving, verbal/visual reasoning, and sequencing tasks with 70% accuracy given mod cues to improve independence for ADL and IADL completion. INTERVENTIONS: Call light: Patient with NO awareness of location of call light; reaching for objects throughout patient's bed when no other objects present. Bed alarm on. SHORT TERM GOAL #3:  Goal 3: Pt will complete thought organization and divergent thinking tasks with 70% accuracy given mod cues to improve processing speed and mental flexibility.    INTERVENTIONS: Divergent naming: Target 5 members per concrete category  Trial 1 Drinks:  3/5 indep, 2/5 nonsensical response  Trial 2 Animals:  4/5 indep, 1/5 nonsensical response     SHORT TERM GOAL #4:  Goal 4: Pt will complete sustained attention tasks with no more than 3 re-directions within 3-5 minute time frame to improve focus/concentration and participation in daily therapies. INTERVENTIONS: Patient required continuous verbal cues to sustain to short treatment session    Counting by 2's:   Trial 1: Counting by 2's 0-10:  Unable to successfully complete task despite max cues  Trial 2: Counting by 2's 0-24: Patient with no attempt stating \"No way can I do that. \"     SHORT TERM GOAL #5:  Goal 5: Pt will complete speech drills/tasks and demonstrate an understanding of S.O.S. speech strategies with 90% accuracy given min cues to improve intelligibility for communicative efficacy. INTERVENTIONS: Patient ~20-30% intelligible within conversation; highly confused language with slurred speech and low vocal intensity.           ASSESSMENT:  Assessment: []Progressing towards goals          [x]Not Progressing towards goals       Patient Tolerance of Treatment:   []Tolerated well [x]Tolerated fair [x]Required rest breaks [x]Fatigued  Plan for Next Session: Orientation   Education:  Learner:  [x]Patient          []Significant Other          []Other  Education provided regarding:  [x]Goals and POC   []Diet and swallowing precautions    []Home Exercise Program  [x]Progress and/or discharge information  Method of Education:  [x]Discussion          [x]Demonstration          []Handout          []Other  Evaluation of Education:   []Verbalized understanding   []Demonstrates without assistance  []Demonstrates with assistance  [x]Needs further instruction     [x]No evidence of learning                  [x]No family present      Plan: [x]Continue with current plan of care    []Modify current plan of care as follows:    []Discharge patient    Discharge Location:    Services/Supervision

## 2019-06-11 NOTE — PLAN OF CARE
Problem: Pain:  Goal: Pain level will decrease  Description  Pain level will decrease  Outcome: Met This Shift     Problem: Pain:  Goal: Control of chronic pain  Description  Control of chronic pain  Outcome: Ongoing  Note:   Pt has chronic pain d/t numerous surgeries and chronic health conditions. Medication along with pillow support maintaining pt's pain at stated level. No intermittent medications needed at this point. Problem: Falls - Risk of:  Goal: Will remain free from falls  Description  Will remain free from falls  Outcome: Ongoing  Note:   Pt below the ankle paraplegic d/t childhood illness. Uses w/c at home but it is not available at this time. Pt working with PT/OT to prevent deconditioning. Educated on use of call light for all needs, educated on use of bed/chair alarm for personal safety. Pt has poor judgement and safety awareness but is cooperative with safety measures put in place. Problem: Risk for Impaired Skin Integrity  Goal: Tissue integrity - skin and mucous membranes  Description  Structural intactness and normal physiological function of skin and  mucous membranes. Outcome: Ongoing  Note:   Pt receiving IV fluids for hydration, encouraged to continue oral fluid intake to promote hydration. Educated on importance of nutritional intake and shifting weight frequently to promote skin health. Problem: HEMODYNAMIC STATUS  Goal: Patient has stable vital signs and fluid balance  Outcome: Ongoing  Note:      06/10/19 2330   Vital Signs   Temp 98 °F (36.7 °C)   Temp Source Oral   Pulse 78   Heart Rate Source Monitor   Resp 16   BP (!) 149/78   BP Location Left Arm   BP Upper/Lower Upper   MAP (mmHg) 107   VSS at this time, will continue to monitor. Problem: ACTIVITY INTOLERANCE/IMPAIRED MOBILITY  Goal: Mobility/activity is maintained at optimum level for patient  Outcome: Ongoing  Note:   Pt is w/c bound at home, working with PT/OT to prevent upper body deconditioning.  Will continue to encourage pt to continue working with therapies. Problem: COMMUNICATION IMPAIRMENT  Goal: Ability to express needs and understand communication  Outcome: Ongoing  Note:   Pt able to voice concerns and asks appropriate questions to POC. Pt concerned with going home, social work on case, family considering home health upon d/c. Problem: Discharge Planning:  Goal: Participates in care planning  Description  Participates in care planning  Outcome: Ongoing  Note:   Pt denies wanting to attempt rehab after d/c, but is open to idea of home health coming into his home to help him after his d/c. Problem: Musculor/Skeletal Functional Status  Goal: Highest potential functional level  Outcome: Ongoing  Note:   Pt has strong upper body strength and is able to move about in bed. Working with speech therapy for cognition along with PT/OT to prevent deconditioning. Problem: DISCHARGE BARRIERS  Goal: Patient's continuum of care needs are met  6/11/2019 0109 by Jignesh Hernandez RN  Outcome: Ongoing  Note:   Social work assigned to pt's case to ensure d/c needs are met including the possibility of home health. Care plan reviewed with patient. Patient verbalized understanding of the plan of care and contribute to goal setting.

## 2019-06-11 NOTE — PROGRESS NOTES
Dr. Ghazal Lemus on floor and notified that pt has been having hallucinations at times. Pt stating, \"he's in my room\" and pt talking to the floor stating, \"my brother is there\". This RN reassured patient that nobody else is in pt's room other than this RN and that pt is in hospital. Dr. Ghazal Lemus also updated that pt is oriented at times, then becomes confused and hallucinating. Orders received for STAT blood cultures.

## 2019-06-11 NOTE — FLOWSHEET NOTE
Bed alarm alarming, pt sitting at end of bed concerned about his family. Pt oriented to room and situation. Pt states that he was dreaming and confused when he woke up. IV clamped by pt and then broke in half by pt, IV still in arm and patent. Site flushed and new tubing applied. Pt educated on importance of using call light for all needs. Reoriented to call light, verbalized understanding.

## 2019-06-11 NOTE — PROGRESS NOTES
IM Progress Note  Dr. Travis Mcgrath    6/11/2019 10:32 AM      Patient name Celia Iglesias. [id number]  PCP: Yanique Herrera MD  Admit Date: 6/9/2019  Acct No. [de-identified]    Subjective: Interval History:   Informed pt hallucinating    Diet: DIET NO SALT ADDED (3-4 GM);    I/O last 3 completed shifts: In: 1678.5 [P.O.:1020; I.V.:658.5]  Out: 100 [Urine:100]  No intake/output data recorded. Admission weight: 150 lb (68 kg) as of 6/9/2019 10:00 AM  Wt Readings from Last 3 Encounters:   06/11/19 138 lb 8 oz (62.8 kg)   05/20/19 145 lb (65.8 kg)   05/09/19 145 lb (65.8 kg)     Body mass index is 20.45 kg/m². ROS   CVS;  no cp or palpitation  Resp: no SOB or cough  Neuro:  No numbness or weakness or dizziness  Abd: no nausea or vomiting or abd pain      Medications:   Scheduled Meds:   cefTRIAXone (ROCEPHIN) IV  2 g Intravenous Q24H    heparin (porcine)  5,000 Units Subcutaneous 3 times per day    amitriptyline  75 mg Oral Nightly    fentaNYL  1 patch Transdermal Q72H    gabapentin  800 mg Oral TID    oxybutynin  5 mg Oral TID    pantoprazole  40 mg Oral BID    potassium (CARDIAC) replacement protocol   Other RX Placeholder     Continuous Infusions:   sodium chloride 75 mL/hr at 06/10/19 1326       Labs :     CBC:   Recent Labs     06/09/19  1045 06/10/19  0337   WBC 9.5 9.8   HGB 10.7* 10.8*    343     BMP:    Recent Labs     06/09/19  1045 06/10/19  0337 06/11/19  0337    142 143   K 3.4* 3.7 4.3    110 110   CO2 21* 18* 21*   BUN 9 9 11   CREATININE 0.9 0.7 0.7   GLUCOSE 99 91 100     Hepatic:   Recent Labs     06/10/19  0337   AST 14   ALT 11   BILITOT 0.2*   ALKPHOS 124     Troponin: No results for input(s): TROPONINI in the last 72 hours. BNP: No results for input(s): BNP in the last 72 hours.   Lipids:   Recent Labs     06/10/19  0337   CHOL 130   HDL 48     INR:   Recent Labs     06/09/19  1045   INR 0.99       Radiology    Objective:   Vitals: /83   Pulse 80 Temp 98 °F (36.7 °C) (Oral)   Resp 16   Ht 5' 9\" (1.753 m)   Wt 138 lb 8 oz (62.8 kg)   SpO2 97%   BMI 20.45 kg/m²   HEENT: Head:pupils react  Neck: supple  Lungs: clear to auscultation  Heart: regular rate and rhythm   Abdomen: soft BS heard   Extremities: warm  Lower ext musculature strophied  Neurologic:  Awake oriented speech slurred    Impression:   :   1.  Slurred speech. 2.  UTI. 3.  Questionable infiltrate noted versus inflammation around his right upper lobe. 4.  Anemia, appears chronic. 5.  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 6.  Hypokalemia. improved      Plan:    Noted MRI no acute process  Pt still not to his baseline per family  Will consult ID   Increase nichelle Morales MD

## 2019-06-11 NOTE — PROGRESS NOTES
CREATININE 0.9 0.7 0.7   GLUCOSE 99 91 100           Results for orders placed during the hospital encounter of 11/06/18   MRI CERVICAL SPINE WO CONTRAST    Narrative PROCEDURE: MRI CERVICAL SPINE WO CONTRAST    CLINICAL INFORMATION: Cervical spondylosis with myelopathy. Additional history obtained from electronic medical record indicates the patient has neck pain and bilateral arm pain with intermittent hand numbness. COMPARISON: None available. TECHNIQUE: Sagittal T1, T2 and STIR sequences were obtained through the cervical spine. Axial fast and echo and gradient echo T2-weighted images were obtained. FINDINGS:    The cervical spine is imaged from the craniocervical junction to the T3 level. No suspicious finding is present at the cervical medullary junction. Note is made that there are subtle foci of hyperintense T2 signal within and posterior to the shruthi at the   midbrain. A small focus of hyperintense T2 signal is also noted within the left side of the cord at the C5-6 level. There is preservation of the expected cervical lordosis. Minimal anterolisthesis is noted of C4 on C5 and there is minimal retrolisthesis of C5 on C6. No acute fracture or suspicious marrow replacing lesion is identified. Type II Modic changes are   present at the endplates of H7-6. Degenerative facet arthropathy is present at every level. With regards to the disc spaces, at C2-C3, a disc osteophyte complex is present without significant spinal canal narrowing. Uncovertebral joint spurring is also noted and there is mild bilateral   neural foraminal narrowing. At C3-C4, there is a disc osteophyte complex resulting in mild spinal canal narrowing. Mild to moderate neural foraminal narrowing is also present on the right with mild neural foraminal narrowing on the left. At C4-C5, there is a disc bulge which indents the ventral thecal sac and causes mild to moderate spinal canal narrowing.  Uncovertebral joint spurring is also present resulting in moderate neural foraminal narrowing on the right and mild to moderate   neural foraminal narrowing on the left. At C5-C6, there is a disc osteophyte complex which causes moderate spinal canal narrowing. Uncovertebral joint spurring is also present and there is severe neural foraminal narrowing on the right and moderate to severe neural foraminal narrowing on the   left. At C6-C7, there is a disc bulge and a posterior midline disc protrusion which indents the ventral thecal sac and causes mild to moderate spinal canal narrowing. Uncovertebral joint spurring is also noted and there is moderate bilateral neural foraminal   narrowing. At C7-T1, no significant spinal canal or neural foraminal narrowing is identified. No suspicious finding is seen within the visualized paraspinal soft tissues. Impression 1. Small foci of hyperintense T2 signal are noted within the shruthi and posterior to the shruthi in the midbrain. These are nonspecific however, may correspond to sequela of chronic microvascular ischemic angiopathy. 2. A small focus of hyperintense T2 signal is present within the cervical spinal cord on the left at the C5-6 level. This most likely represents chronic myelomalacia. 3. Multilevel degenerative changes are present throughout the cervical spine and are further discussed by level in the findings. These are most significant at C5-6 where there is a disc osteophyte complex causing moderate spinal canal narrowing. Uncovertebral joint spurring and facet arthropathy is also present and there is severe neural foraminal narrowing on the right and moderate to severe neural foraminal narrowing on the left. **This report has been created using voice recognition software. It may contain minor errors which are inherent in voice recognition technology. **    Final report electronically signed by Dr. Lui Barbour on 11/7/2018 7:26 AM     Reviewed   Results for artery. Basilar artery: Normal.    Superior cerebellar arteries: Normal.    Posterior cerebral arteries: Normal. The proximal branches are also normal.    There is a normal right posterior communicating artery. No aneurysms, stenoses or occlusions are noted. The superior sagittal sinus, vein of Hamzah, internal cerebral veins, straight sinus, transverse sinuses and sigmoid sinuses are patent. Axial source data: There is a right upper lobe mild patchy opacity which could represent a site of aspiration or early infiltrate. There is no cervical adenopathy. There are no gross abnormalities in the brain. The paranasal sinuses are normally aerated. There are degenerative changes in the cervical spine. Impression    1. Normal vasculature arterial of the head and neck. 2. Early right upper lobe infiltrate versus a site of aspiration or inflammation. **This report has been created using voice recognition software. It may contain minor errors which are inherent in voice recognition technology. **    Final report electronically signed by Dr. Lee Ann Whitlock on 6/9/2019 2:04 PM     Results for orders placed during the hospital encounter of 06/09/19   CTA NECK W WO CONTRAST    Narrative PROCEDURE: CTA HEAD W WO CONTRAST, [facility] [address] INFORMATION: Slurred speech. COMPARISON: No prior study. TECHNIQUE: 1 mm axial images were obtained through the head and neck after the fast bolus administration of contrast. A noncontrast localizer was obtained. 3-D reconstructions were performed on a dedicated 3-D workstation. These include multiplanar MPR   images and multiplanar MIP images. Centerline reconstructions were obtained of the carotid systems. Isovue intravenous contrast was given.     All CT scans at this facility use dose modulation, iterative reconstruction, and/or weight-based dosing when appropriate to reduce radiation dose to as low as reasonably achievable. FINDINGS:      CTA NECK:    Aortic arch and branches: The aortic arch is normal. There is no stenosis at the origin innominate artery, left common carotid artery or either subclavian artery. Right common carotid artery/ICA: The right common carotid artery is normal. The right carotid bulb and right internal carotid artery are normal. There is no stenosis or atherosclerosis. Left common carotid artery/ICA: The left common carotid artery is normal. The left carotid bulb and left internal carotid artery are normal. There is no stenosis or atherosclerosis. Vertebral arteries: The vertebral arteries are normal. The left vertebral artery is dominant. CTA HEAD:      Internal carotid arteries: Normal. The ophthalmic artery origins are also normal.    Middle cerebral arteries: Normal. The proximal branches are also normal.    Anterior cerebral arteries: Normal. The proximal branches are normal. There is a normal small anterior communicating artery. Vertebral arteries: The vertebral arteries are normal.  The left vertebral artery is dominant. The right vertebral artery ends as a posterior inferior cerebellar artery. Basilar artery: Normal.    Superior cerebellar arteries: Normal.    Posterior cerebral arteries: Normal. The proximal branches are also normal.    There is a normal right posterior communicating artery. No aneurysms, stenoses or occlusions are noted. The superior sagittal sinus, vein of Hamzah, internal cerebral veins, straight sinus, transverse sinuses and sigmoid sinuses are patent. Axial source data: There is a right upper lobe mild patchy opacity which could represent a site of aspiration or early infiltrate. There is no cervical adenopathy. There are no gross abnormalities in the brain. The paranasal sinuses are normally aerated. There are degenerative changes in the cervical spine. Impression    1. Normal vasculature arterial of the head and neck.   2. Early right upper lobe infiltrate versus a site of aspiration or inflammation. **This report has been created using voice recognition software. It may contain minor errors which are inherent in voice recognition technology. **    Final report electronically signed by Dr. Shelly Porras on 6/9/2019 2:04 PM    I reviewed the MRI  brain and agree with interpretation. Results for orders placed during the hospital encounter of 06/09/19   MRI BRAIN WO CONTRAST    Narrative PROCEDURE: MRI BRAIN WO CONTRAST    CLINICAL INFORMATION slurred speech. New-onset confusion and slurred speech. COMPARISON: Head CT 6/9/2019. TECHNIQUE: Multiplanar and multiple spin echo MRI images were obtained of the brain without contrast.    FINDINGS:    Motion artifact does degrade the quality of some of these images. These are the best images possible at this time. The diffusion-weighted images are normal.  The brain volume is normal. There is minimal signal hyperintensity scattered in the white matter of the brain suggestive of minimal severity chronic small vessel ischemic changes. There are no intra-or extra-axial collections. There is no hydrocephalus, midline shift or mass effect. There is no susceptibility artifact in the brain. The major intracranial vascular flow voids are present. The midline craniocervical junction structures are normal.  The pituitary gland and brainstem are normal.            Impression    1. No evidence of an acute infarct. 2. Minimal severity chronic small vessel ischemic changes. **This report has been created using voice recognition software. It may contain minor errors which are inherent in voice recognition technology. **    Final report electronically signed by Dr. Shelly Porras on 6/11/2019 8:58 AM     Reviewed   Results for orders placed during the hospital encounter of 06/09/19   CT HEAD WO CONTRAST    Narrative PROCEDURE: NONCONTRAST CT BRAIN    CLINICAL INFORMATION: Slurred speech    COMPARISON: 4/11/2019    TECHNIQUE: Multiple axial 5 mm images of the brain were obtained without administration of intravenous contrast material. ALL CT SCANS AT THIS FACILITY use dose modulation, iterative reconstruction, and/or weight-based dosing when appropriate to reduce   radiation dose to as low as reasonably achievable. FINDINGS:    Lateral, third, fourth ventricles: Normal in size, contour, position. .. Parenchyma:  No acute infarction, mass lesion, or intracranial hemorrhage is seen. Mastoid processes: Well aerated. Normal in appearance. .       Paranasal sinuses/calvarium: Unremarkable        Impression Normal noncontrast CT of the brain. No acute intracranial process. **This report has been created using voice recognition software. It may contain minor errors which are inherent in voice recognition technology. **    Final report electronically signed by Dr. Gretchen Whitaker on 6/9/2019 12:14 PM         Assessment:  Active Problems:    Lower paraplegia ([address]    Slurred speech  Resolved Problems:    * No resolved hospital problems. *  She is appears to be doing better, his speech is more fluent, he is articulate with his thought process, he interacts properly. I suspect this is an episode of metabolic encephalopathy, that has resolved. Plan:    1. MRI brain reviewed showing no acute intracranial process agree with interpretation. 2. Continue with current management. 3. Correct metabolic derangements. 4. Supportive care. 5. DVT prophylaxis. 6. Speech therapy  7. Occupational Therapy  8. Physical therapy  9.  Neurology will evaluate as needed    Janet Dunlap MD, 6/11/2019 9:07 AM

## 2019-06-11 NOTE — PROGRESS NOTES
Patient allowed this RN to give his son Joseph Collar information and [place] code. [place] Collar updated on plan of care. No further questions at this time.

## 2019-06-11 NOTE — PROGRESS NOTES
IM Progress Note  Dr. Brenda Del Valle  Late entry  6/11/2019 10:27 AM      Patient name Cinderella Buerger. [id number]  PCP: Cheryle Base, MD  Admit Date: 6/9/2019  Acct No. [de-identified]    Subjective: Interval History:   Pt still not himself  Lying in bed      Diet: DIET NO SALT ADDED (3-4 GM);    I/O last 3 completed shifts: In: 1678.5 [P.O.:1020; I.V.:658.5]  Out: 100 [Urine:100]  No intake/output data recorded. Admission weight: 150 lb (68 kg) as of 6/9/2019 10:00 AM  Wt Readings from Last 3 Encounters:   06/11/19 138 lb 8 oz (62.8 kg)   05/20/19 145 lb (65.8 kg)   05/09/19 145 lb (65.8 kg)     Body mass index is 20.45 kg/m². ROS   CVS;  no cp or palpitation  Resp: no SOB or cough  Neuro:  No numbness or weakness or dizziness  Abd: no nausea or vomiting or abd pain      Medications:   Scheduled Meds:   cefTRIAXone (ROCEPHIN) IV  2 g Intravenous Q24H    heparin (porcine)  5,000 Units Subcutaneous 3 times per day    amitriptyline  75 mg Oral Nightly    fentaNYL  1 patch Transdermal Q72H    gabapentin  800 mg Oral TID    oxybutynin  5 mg Oral TID    pantoprazole  40 mg Oral BID    potassium (CARDIAC) replacement protocol   Other RX Placeholder     Continuous Infusions:   sodium chloride 75 mL/hr at 06/10/19 1326       Labs :     CBC:   Recent Labs     06/09/19  1045 06/10/19  0337   WBC 9.5 9.8   HGB 10.7* 10.8*    343     BMP:    Recent Labs     06/09/19  1045 06/10/19  0337 06/11/19  0337    142 143   K 3.4* 3.7 4.3    110 110   CO2 21* 18* 21*   BUN 9 9 11   CREATININE 0.9 0.7 0.7   GLUCOSE 99 91 100     Hepatic:   Recent Labs     06/10/19  0337   AST 14   ALT 11   BILITOT 0.2*   ALKPHOS 124     Troponin: No results for input(s): TROPONINI in the last 72 hours. BNP: No results for input(s): BNP in the last 72 hours.   Lipids:   Recent Labs     06/10/19  0337   CHOL 130   HDL 48     INR:   Recent Labs     06/09/19  1045   INR 0.99       Radiology    Objective:   Vitals: BP 124/83   Pulse 80   Temp 98 °F (36.7 °C) (Oral)   Resp 16   Ht 5' 9\" (1.753 m)   Wt 138 lb 8 oz (62.8 kg)   SpO2 97%   BMI 20.45 kg/m²   HEENT: Head:pupils react  Neck: supple  Lungs: clear to auscultation  Heart: regular rate and rhythm   Abdomen: soft BS heard   Extremities: warm  Lower ext musculature strophied  Neurologic:  Awake oriented    Impression:   :   1.  Slurred speech. 2.  UTI. 3.  Questionable infiltrate noted versus inflammation around his right upper lobe. 4.  Anemia, appears chronic. 5.  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 6.  Hypokalemia.       Plan:    Await MRI   Neuro consulted  Cont antibiotics    Daquan Vargas MD

## 2019-06-11 NOTE — PLAN OF CARE
Problem: Pain:  Goal: Pain level will decrease  Description  Pain level will decrease  Outcome: Ongoing  Note:   Pt's stated pain goal is \"5\".  Pt has chronic back pain. Pt administered percocet 1 tab q 8 hrs prn and satisfied with pain relief at this time. Pt has fentanyl patch in place.     Problem: Pain:  Goal: Control of chronic pain  Description  Control of chronic pain  Outcome: Ongoing  Note:   Pt's stated pain goal is \"5\".  Pt has chronic back pain. Pt administered percocet 1 tab q 8 hrs prn and satisfied with pain relief at this time.     Problem: Falls - Risk of:  Goal: Will remain free from falls  Description  Will remain free from falls  Outcome: Ongoing  Note:   Pt has colostomy and urinary sphincter. Bed alarm on, bed in lowest position. Pt normally uses wheelchair to transfer at home. Pt working with therapies. Pt weak with ambulation; pt paraplegic from bilateral ankles down     Problem: Risk for Impaired Skin Integrity  Goal: Tissue integrity - skin and mucous membranes  Description  Structural intactness and normal physiological function of skin and  mucous membranes. Outcome: Ongoing  Note:   Pt has colostomy, incision on scrotum and pelvis. Incisions open to air at this time. Pt has redness on bilateral heels that blanches. Pt turns self.      Problem: HEMODYNAMIC STATUS  Goal: Patient has stable vital signs and fluid balance  Outcome: Ongoing  Note:             06/11/19 1109   Vital Signs   Temp 97.8 °F (36.6 °C)   Temp Source Oral   Pulse 84   Heart Rate Source Monitor   Resp 18   /85   BP Location Right Arm   BP Upper/Lower Upper   MAP (mmHg) 106   Oxygen Therapy   SpO2 93 %   O2 Device None (Room air)     Pt's VS stable at this time.  Continuing to closely monitor pt's blood pressure.               Problem: ACTIVITY INTOLERANCE/IMPAIRED MOBILITY  Goal: Mobility/activity is maintained at optimum level for patient  Outcome: Ongoing  Note:   Pt has active movement in bilateral upper and lower extremities. Pt is paraplegic from ankle down in bilateral lower extremities.     Problem: COMMUNICATION IMPAIRMENT  Goal: Ability to express needs and understand communication  Outcome: Ongoing  Note:   Pt hallucinating at times. Pt's orientation level fluctuating at times. Pt has dysarthria and expressive aphasia at times. Continuing to monitor.     Problem: Discharge Planning:  Goal: Participates in care planning  Description  Participates in care planning  Outcome: Ongoing  Note:   Pt from private residence.  Discharge plans remain in progress at this time.      Care plan reviewed with patient.  Patient verbalizes understanding of the plan of care and contributes to goal setting at this time

## 2019-06-12 LAB
AMPHETAMINE+METHAMPHETAMINE URINE SCREEN: NEGATIVE
BARBITURATE QUANTITATIVE URINE: NEGATIVE
BENZODIAZEPINE QUANTITATIVE URINE: NEGATIVE
CANNABINOID QUANTITATIVE URINE: NEGATIVE
COCAINE METABOLITE QUANTITATIVE URINE: NEGATIVE
OPIATES, URINE: NEGATIVE
OXYCODONE: POSITIVE
PHENCYCLIDINE QUANTITATIVE URINE: NEGATIVE

## 2019-06-12 PROCEDURE — 2709999900 HC NON-CHARGEABLE SUPPLY

## 2019-06-12 PROCEDURE — 2580000003 HC RX 258: Performed by: INTERNAL MEDICINE

## 2019-06-12 PROCEDURE — 97110 THERAPEUTIC EXERCISES: CPT

## 2019-06-12 PROCEDURE — 80307 DRUG TEST PRSMV CHEM ANLYZR: CPT

## 2019-06-12 PROCEDURE — 6370000000 HC RX 637 (ALT 250 FOR IP): Performed by: INTERNAL MEDICINE

## 2019-06-12 PROCEDURE — 99231 SBSQ HOSP IP/OBS SF/LOW 25: CPT | Performed by: PSYCHIATRY & NEUROLOGY

## 2019-06-12 PROCEDURE — 6360000002 HC RX W HCPCS: Performed by: INTERNAL MEDICINE

## 2019-06-12 PROCEDURE — 2060000000 HC ICU INTERMEDIATE R&B

## 2019-06-12 PROCEDURE — 97530 THERAPEUTIC ACTIVITIES: CPT

## 2019-06-12 PROCEDURE — 97542 WHEELCHAIR MNGMENT TRAINING: CPT

## 2019-06-12 RX ORDER — SODIUM CHLORIDE 9 MG/ML
INJECTION, SOLUTION INTRAVENOUS CONTINUOUS
Status: DISCONTINUED | OUTPATIENT
Start: 2019-06-12 | End: 2019-06-13 | Stop reason: HOSPADM

## 2019-06-12 RX ADMIN — OXYCODONE AND ACETAMINOPHEN 1 TABLET: 10; 325 TABLET ORAL at 20:51

## 2019-06-12 RX ADMIN — SODIUM CHLORIDE: 9 INJECTION, SOLUTION INTRAVENOUS at 15:36

## 2019-06-12 RX ADMIN — OXYBUTYNIN CHLORIDE 5 MG: 5 TABLET ORAL at 05:00

## 2019-06-12 RX ADMIN — OXYCODONE AND ACETAMINOPHEN 1 TABLET: 10; 325 TABLET ORAL at 05:01

## 2019-06-12 RX ADMIN — SODIUM CHLORIDE: 9 INJECTION, SOLUTION INTRAVENOUS at 18:46

## 2019-06-12 RX ADMIN — PANTOPRAZOLE SODIUM 40 MG: 40 TABLET, DELAYED RELEASE ORAL at 08:45

## 2019-06-12 RX ADMIN — HEPARIN SODIUM 5000 UNITS: 5000 INJECTION INTRAVENOUS; SUBCUTANEOUS at 15:37

## 2019-06-12 RX ADMIN — CEFTRIAXONE SODIUM 2 G: 2 INJECTION, POWDER, FOR SOLUTION INTRAMUSCULAR; INTRAVENOUS at 20:51

## 2019-06-12 RX ADMIN — PANTOPRAZOLE SODIUM 40 MG: 40 TABLET, DELAYED RELEASE ORAL at 20:51

## 2019-06-12 RX ADMIN — SODIUM CHLORIDE: 9 INJECTION, SOLUTION INTRAVENOUS at 03:45

## 2019-06-12 RX ADMIN — GABAPENTIN 800 MG: 400 CAPSULE ORAL at 05:00

## 2019-06-12 RX ADMIN — HEPARIN SODIUM 5000 UNITS: 5000 INJECTION INTRAVENOUS; SUBCUTANEOUS at 05:01

## 2019-06-12 RX ADMIN — GABAPENTIN 800 MG: 400 CAPSULE ORAL at 15:36

## 2019-06-12 RX ADMIN — GABAPENTIN 800 MG: 400 CAPSULE ORAL at 21:42

## 2019-06-12 RX ADMIN — HEPARIN SODIUM 5000 UNITS: 5000 INJECTION INTRAVENOUS; SUBCUTANEOUS at 21:42

## 2019-06-12 ASSESSMENT — PAIN DESCRIPTION - DESCRIPTORS
DESCRIPTORS: CONSTANT
DESCRIPTORS: CONSTANT

## 2019-06-12 ASSESSMENT — PAIN DESCRIPTION - FREQUENCY
FREQUENCY: CONTINUOUS
FREQUENCY: CONTINUOUS

## 2019-06-12 ASSESSMENT — PAIN SCALES - GENERAL
PAINLEVEL_OUTOF10: 6
PAINLEVEL_OUTOF10: 9
PAINLEVEL_OUTOF10: 10
PAINLEVEL_OUTOF10: 6
PAINLEVEL_OUTOF10: 10

## 2019-06-12 ASSESSMENT — PAIN DESCRIPTION - PAIN TYPE
TYPE: CHRONIC PAIN

## 2019-06-12 ASSESSMENT — PAIN DESCRIPTION - ONSET
ONSET: ON-GOING
ONSET: ON-GOING

## 2019-06-12 ASSESSMENT — PAIN DESCRIPTION - LOCATION
LOCATION: BACK
LOCATION: BACK;LEG
LOCATION: GENERALIZED

## 2019-06-12 ASSESSMENT — PAIN DESCRIPTION - DIRECTION: RADIATING_TOWARDS: LEGS

## 2019-06-12 ASSESSMENT — PAIN - FUNCTIONAL ASSESSMENT
PAIN_FUNCTIONAL_ASSESSMENT: PREVENTS OR INTERFERES SOME ACTIVE ACTIVITIES AND ADLS
PAIN_FUNCTIONAL_ASSESSMENT: PREVENTS OR INTERFERES WITH MANY ACTIVE NOT PASSIVE ACTIVITIES

## 2019-06-12 ASSESSMENT — PAIN DESCRIPTION - PROGRESSION
CLINICAL_PROGRESSION: GRADUALLY WORSENING
CLINICAL_PROGRESSION: GRADUALLY WORSENING

## 2019-06-12 ASSESSMENT — PAIN DESCRIPTION - ORIENTATION
ORIENTATION: LEFT;RIGHT
ORIENTATION: LEFT;RIGHT

## 2019-06-12 NOTE — CARE COORDINATION
6/12/19, 1:57 PM      Bernie Moctezuma. Hospital day: 2  Location: -03/003-A Reason for admit: Slurred speech [R47.81]  Slurred speech [R47.81]   Procedure: MRI Brain WO Contrast   Impression:           1. No evidence of an acute infarct. 2. Minimal severity chronic small vessel ischemic changes. Treatment Plan of Care: ID consult for altered sensorium, UTI, PT/OT following, IV antibiotics continue. PCP: Breezy Wayne MD  Readmission Risk Score: 12%  Discharge Plan: Home alone with new HH.

## 2019-06-12 NOTE — PROGRESS NOTES
[facility] THERAPY MISSED TREATMENT NOTE  [facility] NEUROSCIENCES 4A      Date: 2019  Patient Name: Cinderella Buerger. MRN: [id number]    : 1970  (52 y.o.)    REASON FOR MISSED TREATMENT:  Attempted to see pt for completion of cognitive therapy. JESSICA Molina reported pt with recent arrival of lunch meal, requesting to hold services at this time. Will plan to resume per POC at subsequent tx date.     Amanuel Rojo, M.A., [address] [place] [address]

## 2019-06-12 NOTE — CONSULTS
[address] [place] [address]                                  CONSULTATION    PATIENT NAME: Shashi Justin                   :        1970  MED REC NO:   [id number]                           ROOM:       0003  ACCOUNT NO:   [de-identified]                           ADMIT DATE: 2019  PROVIDER:     Umesh Carlos. Aimee Barker M.D.    [facility] [place]:  2019    REASON FOR CONSULTATION:  To rule out sepsis. HISTORY OF PRESENT ILLNESS:  He is a 80-year-old male patient admitted  to the hospital with slurred speech. 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 He lives independently. He  has history of urinary sphincter creation and has also an old colostomy. He was admitted to the hospital after he was noted to have slurred  speech. There was concern for stroke for which reason he was admitted. His MRI was noted. The patient reported that he takes Ditropan and he  was noted to have a very dry oral cavity, in fact his speech appeared to  be slurred and his tongue was very dry. He denied any weakness on his  extremities. He does not have any headache, fever, or blurring of  vision. He did report of discomfort over his operation site. He has a  colostomy but he knows when his bowels are moving and he does not use  any bag. He denied any soiling of the area. He reports that his mother  is very anxious and she tends to call 911 for every complaint, and when  she talked to him, she thought his speech was slurred and brought him to  the hospital.    PAST MEDICAL HISTORY:  Significant for meningitis, he is paraplegic,  history of neurogenic bladder.     PAST SURGICAL HISTORY:  Includes colostomy, artificial sphincter  creation, and esophageal stricture dilatation. ALLERGIES:  To ASPIRIN, PREDNISONE, and TAPE. CURRENT MEDICATIONS:  Include potassium replacement, Percocet,  oxybutynin, ceftriaxone, amitriptyline. REVIEW OF SYSTEMS:  Noncontributory. PHYSICAL EXAMINATION:  GENERAL:  He was seen sitting on the bed. VITAL SIGNS:  Temperature 98.7, respirations 16, pulse 77, blood  pressure 116/65. HEENT:  He had slightly pale conjunctivae. His oral cavity was  extremely dry. His speech appeared to be slurred, it was likely related  to the dry mouth. CHEST:  Clear. ABDOMEN:  Soft. He has colostomy and was dressed with a 4 x 4 gauze. He has well-healing scrotal wound. There was not any mass, drainage  noted. CNS:  He was conscious; oriented to person, place, and time. He had  very slurred speech. DIAGNOSTICS:  Sodium was 143, potassium 4.3, chloride 110, bicarb 21,  BUN 11, creatinine 0.7. WBC of 9.8, hemoglobin 10.8, hematocrit 34.6,  and platelets of 353. IMPRESSION:  He is a 59-year-old male patient admitted due to slurred  speech. I feel that his slurred speech is related to a very dry oral  mucosa due to side effect of his medication. He is on amitriptyline and  Ditropan which are known to have anticholinergic effect. He has history  of neurogenic bladder, recent intervention ? UTI. The patient has been  placed on antibiotic empirically. We will follow culture report. Advised on oral hydration and maybe he needs revision, modification of  his medications due to the anticholinergic effect of medication  contributing to dry mouth. We will continue to follow the patient.         Ellyn Borges M.D.    D: 06/11/2019 21:52:53       T: 06/12/2019 1:11:21     [email or web address]  Job#: [id number]     Doc#: [id number]    CC:

## 2019-06-12 NOTE — PLAN OF CARE
Problem: Pain:  Goal: Pain level will decrease  Description  Pain level will decrease  Outcome: Met This Shift     Problem: Pain:  Goal: Control of chronic pain  Description  Control of chronic pain  Outcome: Ongoing  Note:   Pt has chronic pain d/t numerous surgeries and chronic health conditions. Pt given pain medications per previous shift. Pillow support and emotional support keeping pain at pt's acceptable level of \"5.\" No intermittent medications needed at this point. Problem: Falls - Risk of:  Goal: Will remain free from falls  Description  Will remain free from falls  Outcome: Ongoing  Note:   Pt below the ankle paraplegic d/t childhood illness. Uses w/c at home but it is not available at this time. Pt working with PT/OT to prevent deconditioning. Educated on use of call light for all needs, educated on use of bed/chair alarm for personal safety. Pt has poor judgement and safety awareness but is cooperative with safety measures put in place. Tele-sitter at bedside for safety. Pt educated on reasoning for equipment and POC. Problem: Risk for Impaired Skin Integrity  Goal: Tissue integrity - skin and mucous membranes  Description  Structural intactness and normal physiological function of skin and  mucous membranes. Outcome: Ongoing  Note:   Pt receiving IV fluids for hydration, encouraged to continue oral fluid intake to promote hydration. Educated on importance of nutritional intake and shifting weight frequently to promote skin health. Pt had mild edema to his back earlier in the day which is decreased at this time. Problem: HEMODYNAMIC STATUS  Goal: Patient has stable vital signs and fluid balance  Outcome: Ongoing  Note:      Vitals:    06/11/19 2000   BP: 116/65   Pulse: 77   Resp: 16   Temp: 98.7 °F (37.1 °C)   SpO2: 97%   VSS at this time, will continue to monitor.      Problem: ACTIVITY INTOLERANCE/IMPAIRED MOBILITY  Goal: Mobility/activity is maintained at optimum level for

## 2019-06-12 NOTE — PLAN OF CARE
IMPAIRMENT  Goal: Ability to express needs and understand communication  Outcome: Ongoing  Note:   Pt hallucinating at times. Pt's orientation level fluctuating at times. Pt has dysarthria at times. Pt able to follow commands. Problem: Discharge Planning:  Goal: Participates in care planning  Description  Participates in care planning  Outcome: Ongoing  Note:   Pt from private residence.  Discharge plans remain in progress at this time.      Care plan reviewed with patient.  Patient verbalizes understanding of the plan of care and contributes to goal setting at this time

## 2019-06-12 NOTE — PROGRESS NOTES
Progress note: Infectious diseases    Patient - Mary Alarcon,  Age - 52 y.o.    - 1970      Room Number - 4A-03/003-A   MRN -  [id number]   Acct # - [de-identified]  Date of Admission -  2019 10:00 AM    SUBJECTIVE:   He has no new complaints. Has dry mouth. OBJECTIVE   VITALS    height is 5' 9\" (1.753 m) and weight is 150 lb (68 kg). His oral temperature is 98.1 °F (36.7 °C). His blood pressure is 155/85 (abnormal) and his pulse is 85. His respiration is 16 and oxygen saturation is 99%. Wt Readings from Last 3 Encounters:   19 150 lb (68 kg)   19 145 lb (65.8 kg)   19 145 lb (65.8 kg)       I/O (24 Hours)    Intake/Output Summary (Last 24 hours) at 2019 0847  Last data filed at 2019 0430  Gross per 24 hour   Intake 3508.96 ml   Output 100 ml   Net 3408.96 ml       General Appearance  Awake, alert, oriented,  not  In acute distress  HEENT - normocephalic, atraumatic, pink conjunctiva,  anicteric sclera  Neck - Supple, no mass  Lungs -  Bilateral air entry, no rhonchi, no wheeze  Cardiovascular - Heart sounds are normal.  Regular rate and rhythm without murmur, gallop or rub.   Abdomen - soft, not distended, nontender, he has colostomy on his left lower abdomen  Neurologic -oriented  Skin - No bruising or bleeding  Extremities - No edema, no cyanosis, clubbing     MEDICATIONS:      cefTRIAXone (ROCEPHIN) IV  2 g Intravenous Q24H    heparin (porcine)  5,000 Units Subcutaneous 3 times per day    amitriptyline  75 mg Oral Nightly    fentaNYL  1 patch Transdermal Q72H    gabapentin  800 mg Oral TID    oxybutynin  5 mg Oral TID    pantoprazole  40 mg Oral BID    potassium (CARDIAC) replacement protocol   Other RX Placeholder      sodium chloride 75 mL/hr at 19 0345     oxyCODONE-acetaminophen      LABS:     CBC:   Recent Labs     19  1045 06/10/19  0337   WBC 9.5 male [facility]) K65.1    Colonization with drug-resistant bacteria Z22.39    Recurrent UTI N39.0    Bacteriuria, asymptomatic R82.71    Post procedure discomfort G89.18    Scrotal pain N50.82    Urinary incontinence R32    Slurred speech R47.81         ASSESSMENT/PLAN   Slurred speech likely related to dry mouth due to side effects of his medications. Neurogenic bladder with urinary incontinence he had sphincter. He is on amitriptyline and Ditropan which can cause dryness of the oral mucosa. Continue current treatment. Blood culture is negative. Consider stopping antibiotic and observing patient.       Angella Hall MD, [address] 6/12/2019 8:47 AM

## 2019-06-12 NOTE — PROGRESS NOTES
w/c bound reports indep with ADLs & IADLs PLOF. Son & mother live nearby. Subjective:     Subjective: nursing ok'd therapy, pt agreeable for therapy but refused to stay up in chair because he needs his cushion his w/c isn't here but he did agree to work on t/fs and mobility     Pain:   .  Pain Assessment  Pain Level: 0       Social/Functional:  Type of Home: Facility  Home Layout: One level  Home Access: Ramped entrance  Home Equipment: Wheelchair-manual, Wheelchair-electric(adjustable bed)     Objective:  Supine to Sit: Supervision(HOB elevated but he reported his bed at home does this, he was impulsive upon sitting up )  Sit to Supine: Modified independent    Transfers  Bed to Chair: Contact guard assistance(to SBA pt needed cues for set up of w/c and when transfering back to bed he didn't lock his breaks gave him cues and he only locked one of them )                 Balance  Comments: sitting edge of bed reaching in base of support w/ SBA due to pt being impulsive     Wheelchair Activities  Propulsion: Yes  Propulsion 1  Propulsion: Manual  Level: Level Tile  Method: NOAE;BECK  Level of Assistance: Supervision  Description/ Details: in narrow spaces and open halls   Distance: 200x1         Exercises:  Exercises  Comments: none focused on transfers and safety concerns        Activity Tolerance:  Activity Tolerance: Patient Tolerated treatment well    Assessment:   Body structures, Functions, Activity limitations: Decreased functional mobility , Decreased strength, Decreased balance, Increased Pain  Assessment: pt tolerated session fair, he was impulsive and required cues for safety w/ transfers discussed pressure relief he reported that he has a roho cushion at home and this helps pt would benefit from cont skilled therapy   Prognosis: Good     REQUIRES PT FOLLOW UP: Yes    Discharge Recommendations:  Discharge Recommendations: Continue to assess pending progress(pt would benefit from 24 hour assist and home

## 2019-06-12 NOTE — PROGRESS NOTES
IM Progress Note  Dr. Lily Keller    6/12/2019 10:19 AM      Patient name David Canchola. [id number]  PCP: Eva Mclean MD  Admit Date: 6/9/2019  Acct No. [de-identified]    Subjective: Interval History:   pt still with some hallucination      Diet: DIET NO SALT ADDED (3-4 GM);    I/O last 3 completed shifts: In: 4397 [P.O.:2120; I.V.:1389]  Out: 100 [Urine:100]  No intake/output data recorded. Admission weight: 150 lb (68 kg) as of 6/9/2019 10:00 AM  Wt Readings from Last 3 Encounters:   06/12/19 150 lb (68 kg)   05/20/19 145 lb (65.8 kg)   05/09/19 145 lb (65.8 kg)     Body mass index is 22.15 kg/m². ROS   CVS;  no cp or palpitation  Resp: no SOB or cough  Neuro:  No numbness or weakness or dizziness  Abd: no nausea or vomiting or abd pain      Medications:   Scheduled Meds:   cefTRIAXone (ROCEPHIN) IV  2 g Intravenous Q24H    heparin (porcine)  5,000 Units Subcutaneous 3 times per day    amitriptyline  75 mg Oral Nightly    fentaNYL  1 patch Transdermal Q72H    gabapentin  800 mg Oral TID    oxybutynin  5 mg Oral TID    pantoprazole  40 mg Oral BID    potassium (CARDIAC) replacement protocol   Other RX Placeholder     Continuous Infusions:   sodium chloride 75 mL/hr at 06/12/19 0345       Labs :     CBC:   Recent Labs     06/09/19  1045 06/10/19  0337   WBC 9.5 9.8   HGB 10.7* 10.8*    343     BMP:    Recent Labs     06/09/19  1045 06/10/19  0337 06/11/19  0337    142 143   K 3.4* 3.7 4.3    110 110   CO2 21* 18* 21*   BUN 9 9 11   CREATININE 0.9 0.7 0.7   GLUCOSE 99 91 100     Hepatic:   Recent Labs     06/10/19  0337   AST 14   ALT 11   BILITOT 0.2*   ALKPHOS 124     Troponin: No results for input(s): TROPONINI in the last 72 hours. BNP: No results for input(s): BNP in the last 72 hours.   Lipids:   Recent Labs     06/10/19  0337   CHOL 130   HDL 48     INR:   Recent Labs     06/09/19  1045   INR 0.99       Radiology    Objective:   Vitals: BP (!) 155/85   Pulse 85   Temp 98.1 °F (36.7 °C) (Oral)   Resp 16   Ht 5' 9\" (1.753 m)   Wt 150 lb (68 kg)   SpO2 99%   BMI 22.15 kg/m²   HEENT: Head:pupils react  Neck: supple  Lungs: clear to auscultation  Heart: regular rate and rhythm   Abdomen: soft BS heard   Extremities: warm  Lower ext musculature strophied  Neurologic:  Awake oriented     Impression:   :   1.  Slurred speech. 2.  UTI. 3.  Questionable infiltrate noted versus inflammation around his right upper lobe. 4.  Anemia, appears chronic. 5.  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 6.  Hypokalemia. improved      Plan:    Recheck his drug screen as they found some vaping stuff  If his mentation improved discharge home  Initial drug screen was only + for marijuana but pt had been on fentanyl and percocet before admission    Gretchen Guidry MD

## 2019-06-12 NOTE — CARE COORDINATION
[address] Transitions Interview     2019    Patient: Navin Adame Patient : 1970   MRN: [id number]  Reason for Admission:   RARS: Readmission Risk Score: 12         Reviewed nH Predict Tool with patient and SW/CM. nH Predict Tool uploaded in the media tab. Recommendation is for home health and discharge plan is for home with home health. Readmission Risk  Patient Active Problem List   Diagnosis    GERD (gastroesophageal reflux disease)    Lower paraplegia (HCC)    Chondromatosis    Shortness of breath    Fatigue    Left nephrolithiasis    Right lower quadrant abdominal pain    Dizziness    Hirschsprung's disease    Fatigue due to exposure    Acute cystitis without hematuria    Acid-base disorder, mixed    SIRS due to infectious process with organ dysfunction (HCC)    Bladder spasms    HTN (hypertension), benign    Pain aggravated by activities of daily living    Spinal cord tumor with multiple back surgeries    Impaired mobility and ADLs - due to previous spine tumor./ surgeries    Sepsis ([facility]    Nephrolithiasis    Acute cystitis without hematuria    Abnormal abdominal CT scan    Ureteral stone    Pelvic abscess in male ([facility]    Colonization with drug-resistant bacteria    Recurrent UTI    Bacteriuria, asymptomatic    Post procedure discomfort    Scrotal pain    Urinary incontinence    Slurred speech       Inpatient Assessment  Care Transitions Summary    Care Transitions Inpatient Review  Medication Review  Are you able to afford your medications?:  Yes  How often do you have difficulty taking your medications?:  I always take them as prescribed.   Housing Review  Who do you live with?:  Alone  Are you an active caregiver in your home?:  No  Social Support  Durable Medical Equipment  Patient DME:  Wheelchair  Functional Review  Ability to seek help/take action for Emergent/Urgent situations i.e. fire, crime, inclement weather or health

## 2019-06-12 NOTE — PROGRESS NOTES
[address]  Occupational Therapy  Daily Note  Time:   Time In: 9111  Time Out: 1110  Timed Code Treatment Minutes: 24 Minutes  Minutes: 24          Date: 2019  Patient Name: Nolan Kohli,   Gender: male      Room: [facility]03/003-A  MRN: [id number]  : 1970  (52 y.o.)  Referring Practitioner: Dr. Zeus Conn  Diagnosis: slurred speech  Additional Pertinent Hx: Per ER note on 19: 52 y.o. male with a past medical history of arthritis, GERD, paraplegia of the BLE, HTN, and depression. The patient presents to the ED for evaluation of slurred speech. Last known well was approximately 17:00 Friday evening. Patient states that he waited at home to see if the symptoms improved. He has noticed some weakness on the right as well. Restrictions/Precautions:  Restrictions/Precautions: Fall Risk    SUBJECTIVE: Pt seated upright in bed upon arrival, agreeable to OT session with encouragement. PAIN: Denies    COGNITION: Decreased Arousal/Alertness, Decreased Attention Span and follows one step command with increased time, requires cues to re-direct to task, garbbled speech noted. ADL:   No ADL's completed this session. Pt declined ADLs. Daiana Robbins BALANCE:  Sitting Balance:  Stand By Assistance  EOB. BED MOBILITY:  Supine to Sit: Stand By Assistance  Scooting: Stand By Assistance EOB. TRANSFERS:  Pt decline functional transfer training and OOB activity. ADDITIONAL ACTIVITIES:  Completed BUE exercises x10 reps x1 set using min resistance band seated EOB in all joints/planes to increase strength and endurance required for ADLs. Pt required brief rest break in between each exercise and min v/c for proper technique, attend to task. ASSESSMENT:  Activity Tolerance:  Patient tolerance of  treatment: fair. Pt limited by fatigue.       Discharge Recommendations: Continue to assess pending progress  Equipment Recommendations: Equipment Needed: No  Plan: Times per week: 5x  Current Treatment Recommendations: Balance Training, Functional Mobility Training, Safety Education & Training, Self-Care / ADL    Patient Education  Patient Education: Home Exercise Program, Importance of Increasing Activity and Bed mobility. Goals  Short term goals  Time Frame for Short term goals: 2 weeks  Short term goal 1: Complete stand-pivot t/fs to various surfaces including BSC & w/c  Short term goal 2: Complete LE dressing with S & 0-2 vcs for safety  Long term goals  Time Frame for Long term goals : No LTG set d/t short ELOS    Following session, patient left in safe position with all fall risk precautions in place.

## 2019-06-12 NOTE — PROGRESS NOTES
NEUROLOGY INPATIENT PROGRESS NOTE    Patient- Ana Neumann. MRN -  [id number]   [facility]      - 1970    52 y.o. Subjective: The patient is seen as follow up for acute encephalopathy. Patient is alert at this time. Objective:   /75   Pulse 88   Temp 98 °F (36.7 °C) (Oral)   Resp 16   Ht 5' 9\" (1.753 m)   Wt 150 lb (68 kg)   SpO2 97%   BMI 22.15 kg/m²       Intake/Output Summary (Last 24 hours) at 2019 1853  Last data filed at 2019 1350  Gross per 24 hour   Intake 2539.19 ml   Output 560 ml   Net 1979.19 ml       Physical Exam:  General:  Awake, alert, not in distress. Language is intact. Oriented to self, place, but not time, he is a little slow but appropriate. HEENT: pink conjunctiva, unicteric sclera, moist oral mucosa. EOMI,   Neck: There is no carotid bruits. The Neck is supple. Neuro: CN 2-12 grossly intact with no focal deficits. Power 5/5 Throughout symmetric except lower extrs, Reflexes are +2 symmetric. Long tracts are intact. Cerebellar exam is Intact. Sensory exam is intact to light touch. Patient is wheel chair bound, plegic on his lower extremities from childhood. No abnormal movement. Osteo: There is no LROM of any of the 4 extremity joints, no joint tenderness. ROS:    Cardiac: no chest pain. No palpitations.   Renal : no flank pain, no hematuria  Skin: no rash    Medications:     cefTRIAXone (ROCEPHIN) IV  2 g Intravenous Q24H    heparin (porcine)  5,000 Units Subcutaneous 3 times per day    amitriptyline  75 mg Oral Nightly    fentaNYL  1 patch Transdermal Q72H    gabapentin  800 mg Oral TID    oxybutynin  5 mg Oral TID    pantoprazole  40 mg Oral BID    potassium (CARDIAC) replacement protocol   Other RX Placeholder       Data:   CBC:   Recent Labs     06/10/19  0337   WBC 9.8   HGB 10.8*        BMP:    Recent Labs     06/10/19  0337 06/11/19  0337    143   K 3.7 4.3    110   CO2 18* 21*   BUN 9 11   CREATININE 0.7 0.7   GLUCOSE 91 100           Results for orders placed during the hospital encounter of 11/06/18   MRI CERVICAL SPINE WO CONTRAST    Narrative PROCEDURE: MRI CERVICAL SPINE WO CONTRAST    CLINICAL INFORMATION: Cervical spondylosis with myelopathy. Additional history obtained from electronic medical record indicates the patient has neck pain and bilateral arm pain with intermittent hand numbness. COMPARISON: None available. TECHNIQUE: Sagittal T1, T2 and STIR sequences were obtained through the cervical spine. Axial fast and echo and gradient echo T2-weighted images were obtained. FINDINGS:    The cervical spine is imaged from the craniocervical junction to the T3 level. No suspicious finding is present at the cervical medullary junction. Note is made that there are subtle foci of hyperintense T2 signal within and posterior to the shruthi at the   midbrain. A small focus of hyperintense T2 signal is also noted within the left side of the cord at the C5-6 level. There is preservation of the expected cervical lordosis. Minimal anterolisthesis is noted of C4 on C5 and there is minimal retrolisthesis of C5 on C6. No acute fracture or suspicious marrow replacing lesion is identified. Type II Modic changes are   present at the endplates of T4-8. Degenerative facet arthropathy is present at every level. With regards to the disc spaces, at C2-C3, a disc osteophyte complex is present without significant spinal canal narrowing. Uncovertebral joint spurring is also noted and there is mild bilateral   neural foraminal narrowing. At C3-C4, there is a disc osteophyte complex resulting in mild spinal canal narrowing. Mild to moderate neural foraminal narrowing is also present on the right with mild neural foraminal narrowing on the left. At C4-C5, there is a disc bulge which indents the ventral thecal sac and causes mild to moderate spinal canal narrowing. No results found for this or any previous visit. Results for orders placed during the hospital encounter of 06/09/19   CTA HEAD W WO CONTRAST    Narrative PROCEDURE: CTA HEAD W WO CONTRAST, CTA NECK W WO CONTRAST    CLINICAL INFORMATION: Slurred speech. COMPARISON: No prior study. TECHNIQUE: 1 mm axial images were obtained through the head and neck after the fast bolus administration of contrast. A noncontrast localizer was obtained. 3-D reconstructions were performed on a dedicated 3-D workstation. These include multiplanar MPR   images and multiplanar MIP images. Centerline reconstructions were obtained of the carotid systems. Isovue intravenous contrast was given. All CT scans at this facility use dose modulation, iterative reconstruction, and/or weight-based dosing when appropriate to reduce radiation dose to as low as reasonably achievable. FINDINGS:      CTA NECK:    Aortic arch and branches: The aortic arch is normal. There is no stenosis at the origin innominate artery, left common carotid artery or either subclavian artery. Right common carotid artery/ICA: The right common carotid artery is normal. The right carotid bulb and right internal carotid artery are normal. There is no stenosis or atherosclerosis. Left common carotid artery/ICA: The left common carotid artery is normal. The left carotid bulb and left internal carotid artery are normal. There is no stenosis or atherosclerosis. Vertebral arteries: The vertebral arteries are normal. The left vertebral artery is dominant. CTA HEAD:      Internal carotid arteries: Normal. The ophthalmic artery origins are also normal.    Middle cerebral arteries: Normal. The proximal branches are also normal.    Anterior cerebral arteries: Normal. The proximal branches are normal. There is a normal small anterior communicating artery. Vertebral arteries: The vertebral arteries are normal.  The left vertebral artery is dominant.  The right vertebral artery ends as a posterior inferior cerebellar artery. Basilar artery: Normal.    Superior cerebellar arteries: Normal.    Posterior cerebral arteries: Normal. The proximal branches are also normal.    There is a normal right posterior communicating artery. No aneurysms, stenoses or occlusions are noted. The superior sagittal sinus, vein of Hamzah, internal cerebral veins, straight sinus, transverse sinuses and sigmoid sinuses are patent. Axial source data: There is a right upper lobe mild patchy opacity which could represent a site of aspiration or early infiltrate. There is no cervical adenopathy. There are no gross abnormalities in the brain. The paranasal sinuses are normally aerated. There are degenerative changes in the cervical spine. Impression    1. Normal vasculature arterial of the head and neck. 2. Early right upper lobe infiltrate versus a site of aspiration or inflammation. **This report has been created using voice recognition software. It may contain minor errors which are inherent in voice recognition technology. **    Final report electronically signed by Dr. Colton Burgos on 6/9/2019 2:04 PM     Results for orders placed during the hospital encounter of 06/09/19   CTA NECK W WO CONTRAST    Narrative PROCEDURE: CTA HEAD W WO CONTRAST, [facility]A [address] INFORMATION: Slurred speech. COMPARISON: No prior study. TECHNIQUE: 1 mm axial images were obtained through the head and neck after the fast bolus administration of contrast. A noncontrast localizer was obtained. 3-D reconstructions were performed on a dedicated 3-D workstation. These include multiplanar MPR   images and multiplanar MIP images. Centerline reconstructions were obtained of the carotid systems. Isovue intravenous contrast was given.     All CT scans at this facility use dose modulation, iterative reconstruction, and/or weight-based dosing when appropriate to reduce vasculature arterial of the head and neck. 2. Early right upper lobe infiltrate versus a site of aspiration or inflammation. **This report has been created using voice recognition software. It may contain minor errors which are inherent in voice recognition technology. **    Final report electronically signed by Dr. Shelly Porras on 6/9/2019 2:04 PM     Results for orders placed during the hospital encounter of 06/09/19   MRI BRAIN WO CONTRAST    Narrative PROCEDURE: MRI BRAIN WO CONTRAST    CLINICAL INFORMATION slurred speech. New-onset confusion and slurred speech. COMPARISON: Head CT 6/9/2019. TECHNIQUE: Multiplanar and multiple spin echo MRI images were obtained of the brain without contrast.    FINDINGS:    Motion artifact does degrade the quality of some of these images. These are the best images possible at this time. The diffusion-weighted images are normal.  The brain volume is normal. There is minimal signal hyperintensity scattered in the white matter of the brain suggestive of minimal severity chronic small vessel ischemic changes. There are no intra-or extra-axial collections. There is no hydrocephalus, midline shift or mass effect. There is no susceptibility artifact in the brain. The major intracranial vascular flow voids are present. The midline craniocervical junction structures are normal.  The pituitary gland and brainstem are normal.            Impression    1. No evidence of an acute infarct. 2. Minimal severity chronic small vessel ischemic changes. **This report has been created using voice recognition software. It may contain minor errors which are inherent in voice recognition technology. **    Final report electronically signed by Dr. Shelly Porras on 6/11/2019 8:58 AM     No results found for this or any previous visit. No results found for this or any previous visit.   Results for orders placed during the hospital encounter of 06/09/19   CT HEAD WO CONTRAST    Narrative PROCEDURE: NONCONTRAST CT BRAIN    CLINICAL INFORMATION: Slurred speech    COMPARISON: 4/11/2019    TECHNIQUE: Multiple axial 5 mm images of the brain were obtained without administration of intravenous contrast material. ALL CT SCANS AT THIS FACILITY use dose modulation, iterative reconstruction, and/or weight-based dosing when appropriate to reduce   radiation dose to as low as reasonably achievable. FINDINGS:    Lateral, third, fourth ventricles: Normal in size, contour, position. .. Parenchyma:  No acute infarction, mass lesion, or intracranial hemorrhage is seen. Mastoid processes: Well aerated. Normal in appearance. .       Paranasal sinuses/calvarium: Unremarkable        Impression Normal noncontrast CT of the brain. No acute intracranial process. **This report has been created using voice recognition software. It may contain minor errors which are inherent in voice recognition technology. **    Final report electronically signed by Dr. Joseph Toscano on 6/9/2019 12:14 PM         Assessment:  Active Problems:    Lower paraplegia ([address]    Slurred speech  Resolved Problems:    * No resolved hospital problems. *  52 yo man with acute encephalopathy. Plan:    1. Not a stroke or seizure  2. Likely metabolic for UTI  3. Will sign off, please call if there is question.     Laurie Nolan MD, 6/12/2019 6:53 PM     Dulce Maria Sullivan MD  Attending Neurologist/Neurointensivist

## 2019-06-12 NOTE — PROGRESS NOTES
Per pt's son, pt has accumulated percocet pills at home due to having multiple hospital stays and pt's son stated, pt Amalia Hernandez takes multiple pills at once at home\". Per pt's son, pt ran out of supply of percocet three days prior to admission this stay. Dr. Wilder Port updated.

## 2019-06-13 ENCOUNTER — TELEPHONE (OUTPATIENT)
Dept: FAMILY MEDICINE CLINIC | Age: 49
End: 2019-06-13

## 2019-06-13 VITALS
RESPIRATION RATE: 16 BRPM | SYSTOLIC BLOOD PRESSURE: 139 MMHG | OXYGEN SATURATION: 96 % | HEART RATE: 88 BPM | BODY MASS INDEX: 20.87 KG/M2 | TEMPERATURE: 98.4 F | WEIGHT: 140.9 LBS | HEIGHT: 69 IN | DIASTOLIC BLOOD PRESSURE: 84 MMHG

## 2019-06-13 PROCEDURE — 6370000000 HC RX 637 (ALT 250 FOR IP): Performed by: INTERNAL MEDICINE

## 2019-06-13 PROCEDURE — 2580000003 HC RX 258: Performed by: INTERNAL MEDICINE

## 2019-06-13 PROCEDURE — 6360000002 HC RX W HCPCS: Performed by: INTERNAL MEDICINE

## 2019-06-13 RX ADMIN — OXYCODONE AND ACETAMINOPHEN 1 TABLET: 10; 325 TABLET ORAL at 13:56

## 2019-06-13 RX ADMIN — GABAPENTIN 800 MG: 400 CAPSULE ORAL at 13:56

## 2019-06-13 RX ADMIN — HEPARIN SODIUM 5000 UNITS: 5000 INJECTION INTRAVENOUS; SUBCUTANEOUS at 05:31

## 2019-06-13 RX ADMIN — GABAPENTIN 800 MG: 400 CAPSULE ORAL at 05:34

## 2019-06-13 RX ADMIN — PANTOPRAZOLE SODIUM 40 MG: 40 TABLET, DELAYED RELEASE ORAL at 10:14

## 2019-06-13 RX ADMIN — OXYBUTYNIN CHLORIDE 5 MG: 5 TABLET ORAL at 13:56

## 2019-06-13 RX ADMIN — SODIUM CHLORIDE: 9 INJECTION, SOLUTION INTRAVENOUS at 06:46

## 2019-06-13 RX ADMIN — OXYCODONE AND ACETAMINOPHEN 1 TABLET: 10; 325 TABLET ORAL at 05:33

## 2019-06-13 ASSESSMENT — PAIN SCALES - GENERAL
PAINLEVEL_OUTOF10: 6
PAINLEVEL_OUTOF10: 8
PAINLEVEL_OUTOF10: 6
PAINLEVEL_OUTOF10: 7

## 2019-06-13 NOTE — TELEPHONE ENCOUNTER
.Transition of Care visit scheduled.   6/20/2019  Patient is being discharged to home  Date of discharge 06/13/19  Discharge from facility [facility]  Reason for admission UTI

## 2019-06-13 NOTE — PLAN OF CARE
Problem: Pain:  Goal: Pain level will decrease  Description  Pain level will decrease  Outcome: Ongoing  Note:   Ongoing assessment & interventions provided throughout shift. Reminded patient to report any pain, pressure, or shortness of breath to the nurse. Pain medications provided per physician's orders. Problem: Falls - Risk of:  Goal: Will remain free from falls  Description  Will remain free from falls  Outcome: Ongoing  Note:   Assessment & interventions provided throughout shift. Bed locked & in low position, call light in reach, side-rails up x2, non-slip socks on when ambulating, reminded patient to use call light to call for assistance.

## 2019-06-13 NOTE — PROGRESS NOTES
IM Progress Note  Dr. Ian Pino    6/13/2019 1:58 PM      Patient name Sydney Reid. [id number]  PCP: Dane Jung MD  Admit Date: 6/9/2019  Acct No. [de-identified]    Subjective: Interval History:   Alert and oriented  Informed family felt pt back to his baseline        Diet: DIET NO SALT ADDED (3-4 GM);    I/O last 3 completed shifts: In: 2699.4 [P.O.:930; I.V.:1769.4]  Out: 1060 [Urine:1060]  No intake/output data recorded. Admission weight: 150 lb (68 kg) as of 6/9/2019 10:00 AM  Wt Readings from Last 3 Encounters:   06/13/19 140 lb 14.4 oz (63.9 kg)   05/20/19 145 lb (65.8 kg)   05/09/19 145 lb (65.8 kg)     Body mass index is 20.81 kg/m². ROS   CVS;  no cp or palpitation  Resp: no SOB or cough  Neuro:  No numbness or weakness or dizziness  Abd: no nausea or vomiting or abd pain      Medications:   Scheduled Meds:   cefTRIAXone (ROCEPHIN) IV  2 g Intravenous Q24H    heparin (porcine)  5,000 Units Subcutaneous 3 times per day    amitriptyline  75 mg Oral Nightly    fentaNYL  1 patch Transdermal Q72H    gabapentin  800 mg Oral TID    oxybutynin  5 mg Oral TID    pantoprazole  40 mg Oral BID    potassium (CARDIAC) replacement protocol   Other RX Placeholder     Continuous Infusions:   sodium chloride 100 mL/hr at 06/13/19 0646       Labs :     CBC:   No results for input(s): WBC, HGB, PLT in the last 72 hours. BMP:    Recent Labs     06/11/19  0337      K 4.3      CO2 21*   BUN 11   CREATININE 0.7   GLUCOSE 100     Hepatic:   No results for input(s): AST, ALT, ALB, BILITOT, ALKPHOS in the last 72 hours. Troponin: No results for input(s): TROPONINI in the last 72 hours. BNP: No results for input(s): BNP in the last 72 hours. Lipids:   No results for input(s): CHOL, HDL in the last 72 hours. Invalid input(s): LDLCALCU  INR:   No results for input(s): INR in the last 72 hours.     Radiology    Objective:   Vitals: /84   Pulse 88   Temp 98.4 °F (36.9 °C) (Oral)   Resp 16   Ht 5' 9\" (1.753 m)   Wt 140 lb 14.4 oz (63.9 kg)   SpO2 96%   BMI 20.81 kg/m²   HEENT: Head:pupils react  Neck: supple  Lungs: clear to auscultation  Heart: regular rate and rhythm   Abdomen: soft BS heard   Extremities: warm  Lower ext musculature strophied  Neurologic:  Awake oriented     Impression:   :   1.  Slurred speech. Resolved MRI neg  2. UTI. 3.  Questionable infiltrate noted versus inflammation around his right upper lobe. 4.  Anemia, appears chronic. 5.  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 6.  Hypokalemia. improved      Plan:    Discharge home   Per ID no further antibiotics and to stop elavil    Yaron Nolasco MD

## 2019-06-13 NOTE — FLOWSHEET NOTE
[address] [address]  PHYSICAL THERAPY MISSED TREATMENT NOTE  ACUTE CARE  [facility]       First attempt pt eating. Second attempt pt sitting EOB with nurse getting dc instructions.        Missed Treatment  Quincy Held PTA [id number]

## 2019-06-14 ENCOUNTER — CARE COORDINATION (OUTPATIENT)
Dept: CASE MANAGEMENT | Age: 49
End: 2019-06-14

## 2019-06-14 ENCOUNTER — TELEPHONE (OUTPATIENT)
Dept: FAMILY MEDICINE CLINIC | Age: 49
End: 2019-06-14

## 2019-06-14 NOTE — CARE COORDINATION
[facility] Transitions Initial Follow Up Call    Call within 2 business days of discharge: Yes    Patient: Corinne Euceda. Patient : 1970   MRN: [id number]  Reason for Admission: slurred speach  Discharge Date: 19 RARS: Readmission Risk Score: 11  CMRS 4    Last Discharge [address] [address]       Complaint Diagnosis Description Type Department Provider    19 Extremity Weakness; Aphasia Slurred speech ED to Hosp-Admission (Discharged) (ADMITTED) Margarita Lorenz MD           Spoke with: [id number]: [facility]    Non-face-to-face services provided:  Obtained and reviewed discharge summary and/or continuity of care documents  Assessment and support for treatment adherence and medication management-reviewed meds with the pt    Care Transitions 24 Hour Call    Do you have any ongoing symptoms?:  No  Do you have a copy of your discharge instructions?:  Yes  Do you have all of your prescriptions and are they filled?:  Yes  Have you scheduled your follow up appointment?:  Yes  How are you going to get to your appointment?:  Car - family or friend to transport  Were you discharged with any Home Care or Post Acute Services:  Yes  Post Acute Services:  Home Health (Comment: [facility])  Patient DME:  Wheelchair  Do you have support at home?:  Alone  Do you feel like you have everything you need to keep you well at home?:  Yes  Are you an active caregiver in your home?:  No  Care Transitions Interventions  No Identified Needs     Patient presents to the ED for evaluation of slurred speech on 19. MRI brain did not show any acute process. CT head was unremarkable. Past hx he had spinal meningitis and became paraplegic. Slurred speech, negative for any acute process. Noted it may have been a side effect to some of his meds. Found to have a UTI, underwent revision of artificial urinary sphincter on  19, pt prior admission.        Called pt for the care transition initial follow up

## 2019-06-14 NOTE — DISCHARGE SUMMARY
[address] [place] [address]                               DISCHARGE SUMMARY    PATIENT NAME: Valentín Dacosta                   :        1970  MED REC NO:   [id number]                           ROOM:       0003  ACCOUNT NO:   [de-identified]                           ADMIT DATE: 2019  PROVIDER:     KP Fernandes: 2019    HOSPITAL COURSE:  This is a 49-year-old male with past medical history  of spinal tumor, status post surgery, following which he had spinal  meningitis and became paraplegic. Presented to the ER with complaints  of speech. Apparently, he had revision of his artificial urinary  sphincter about a few weeks back. There was concern as the patient did  have a stroke. CT was unremarkable. MRI did not show any acute  process. He was treated empirically for possible infection for the  urinary tract and his slurred speech still continued to remain along  with worsening of his awareness. He was more hallucinating. We also  came to find out that patient has been vaping. He has been on fentanyl  and Percocet at home, but his drug screen when he presented was only  positive for marijuana. Family did mention later that he does have a  tendency to take all his medications and ran out of medications. He  continued to steadily improve. We also were holding his Ditropan and  his Elavil as it was felt that it might contribute to his dry mouth. Apparently, the patient has improved. Per ID, no further antibiotics  needed. His Ditropan will be resumed, but ID recommended to hold off on  his Elavil. FINAL DIAGNOSES:  1. Slurred speech, negative for any acute process. 2.  UTI, complete antibiotics. 3.  Anemia, appears chronic. 4.  Paraplegia from spinal meningitis, following spinal surgery for  lipoma. 5.  Hypokalemia, improved.   6.  Hallucinations, also improved. MEDICATIONS:  To continue as addressed in the discharge sheet. DISPOSITION:  Home. DIET:  As tolerated. ACTIVITY:  As tolerated. FOLLOWUP:  With family physician. CONDITION ON DISCHARGE:  Stable.         Clover Nolen M.D.    D: 06/13/2019 14:05:36       T: 06/13/2019 20:50:53     [email or web address]  Job#: [id number]     Doc#: [id number]    CC:

## 2019-06-14 NOTE — TELEPHONE ENCOUNTER
[facility] Transitions Initial Follow Up Call    Call within 2 business days of discharge: No     Patient: Yazmin Barbosa. Patient : 1970 MRN: [id number]    [unfilled]    RARS: Readmission Risk Score: 6       Spoke with: Patient- Left message to call the office back.  Notified of phone hours    Discharge department/facility: [facility]    Non-face-to-face services provided:  Scheduled appointment with PCP- on 2019 @ 10:10am    Follow Up  Future Appointments   Date Time Provider Karel Wiseman   2019  8:30 AM Kaylin Garcia MD [address] [facility] P - [place]   2019 10:10 AM Portia Nieto [facility] Zohra Lorenzo LPN

## 2019-06-16 LAB
BLOOD CULTURE, ROUTINE: NORMAL
BLOOD CULTURE, ROUTINE: NORMAL

## 2019-06-17 ENCOUNTER — OFFICE VISIT (OUTPATIENT)
Dept: UROLOGY | Age: 49
End: 2019-06-17

## 2019-06-17 VITALS
DIASTOLIC BLOOD PRESSURE: 86 MMHG | SYSTOLIC BLOOD PRESSURE: 146 MMHG | HEART RATE: 92 BPM | BODY MASS INDEX: 21.41 KG/M2 | WEIGHT: 145 LBS

## 2019-06-17 DIAGNOSIS — N39.3 STRESS INCONTINENCE OF URINE: Primary | ICD-10-CM

## 2019-06-17 PROCEDURE — 99024 POSTOP FOLLOW-UP VISIT: CPT | Performed by: UROLOGY

## 2019-06-18 ENCOUNTER — CARE COORDINATION (OUTPATIENT)
Dept: CASE MANAGEMENT | Age: 49
End: 2019-06-18

## 2019-06-18 NOTE — CARE COORDINATION
[facility] Transitions Follow Up Call    2019    Patient: Sydney Reid. Patient : 1970   MRN: [id number]  Reason for Admission:   Discharge Date: 19 RARS: Readmission Risk Score: 11       Attempted sub call. No answer. Phone just rang.     Follow Up  Future Appointments   Date Time Provider Karel Wiseman   2019 10:10 AM [facility] [address] [place] [address]   2019  9:00 AM Umair Combs MD [address] [place] [address] Urology P - Jose Miguel Gaines RN

## 2019-06-18 NOTE — PROCEDURES
[address] [place] [address]                          ELECTROENCEPHALOGRAM REPORT    PATIENT NAME: Grover Griggs                   :        1970  MED REC NO:   [id number]                           ROOM:       0003  ACCOUNT NO:   [de-identified]                           ADMIT DATE: 2019  PROVIDER:     Talia Johnson. Otto Hawkins MD    DATE OF EE/10/2019    REFERRING PROVIDER:  Talia Johnson. MD Ara    CLINICAL HISTORY:  A 77-year-old male with presentation of sleepiness,  restlessness, dysarthria, confusion. Evaluate for seizure. CLINICAL INTERPRETATION:  This is a 17-channel EEG performed without  sleep deprivation. Hyperventilation was not performed. Photic  stimulation was performed. The patient is described as alert, sleepy at  times. Background rhythm activity is noted to be 7 to 8 Hz in the posterior  parietal area, symmetric, attenuates with eye opening. Excessive  slowing was seen in theta range suggestive of cortical dysfunction such  as seen with encephalopathy. Lead and muscle artifacts were noted  limiting quality of data obtained. Photic stimulation was performed  without abnormality. There was no evidence of epileptiform activity  appreciated. IMPRESSION:  This is a mildly abnormal EEG due to the excessive slowing  seen in theta range suggestive of cortical dysfunction such as seen with  encephalopathy. However, there was no evidence of epileptiform activity  appreciated.         Hattie Couch MD    D: 2019 16:07:52       T: 2019 16:14:18     [email or web address]  Job#: [id number]     Doc#: [id number]    CC: Subjective:    Patient ID:  Tara Mondragon is a 75 y.o. female who presents for follow-up of Valvular Heart Disease      HPI     74 y/o female with hx of CAD s/p 2V CABG (LIMA-LAD, SVG-PDA) and severe MR s/p MVR by Dr Jason 2013, MS s/p recent mitral Ricardo with 26mm Alex S3 TMVR by Dr Faria (last 2DE with MG 7 mmHg, PHT MVA 1.6 cm2), HFrEF with EF 45% (KIMBERLY 4.71 cm), moderate AS per last 2DE with ELLIOTT 1.36 cm2/PV 2.8 m/s/MG 43.78 mmHg,  PAD (occluded mid LSFA with Kong IIa symptoms on cilostazol), HTN, HLD, CKD who presents for f/u. Symptoms of SOB and fatigue resolved post MVR Ricardo. Completely at least 2 months of coumadin post procedure. Doing well and currently with NYHA FC I symptoms. Mild HERNANDES with moderate to heavy activity. Denies CP, orthopnea, PND, syncope, palps, LE edema. Compliant with meds.     Review of Systems   Constitution: Negative for malaise/fatigue.   HENT: Negative for congestion.    Eyes: Negative for blurred vision.   Cardiovascular: Positive for dyspnea on exertion. Negative for chest pain, claudication, cyanosis, irregular heartbeat, leg swelling, near-syncope, orthopnea, palpitations, paroxysmal nocturnal dyspnea and syncope.   Respiratory: Negative for shortness of breath.    Endocrine: Negative for polyuria.   Hematologic/Lymphatic: Negative for bleeding problem.   Skin: Negative for itching and rash.   Musculoskeletal: Negative for joint swelling, muscle cramps and muscle weakness.   Gastrointestinal: Negative for abdominal pain, hematemesis, hematochezia, melena, nausea and vomiting.   Genitourinary: Negative for dysuria and hematuria.   Neurological: Negative for dizziness, focal weakness, headaches, light-headedness, loss of balance and weakness.   Psychiatric/Behavioral: Negative for depression. The patient is not nervous/anxious.         Objective:    Physical Exam   Constitutional: She is oriented to person, place, and time. She appears well-developed and well-nourished.   HENT:    Head: Normocephalic and atraumatic.   Neck: Neck supple. No JVD present.   Cardiovascular: Normal rate and regular rhythm.   Murmur heard.   Systolic murmur is present with a grade of 3/6.  Pulses:       Carotid pulses are 2+ on the right side, and 2+ on the left side.       Radial pulses are 2+ on the right side, and 2+ on the left side.        Femoral pulses are 2+ on the right side, and 2+ on the left side.       Posterior tibial pulses are 1+ on the right side, and 1+ on the left side.   Pulmonary/Chest: Effort normal and breath sounds normal.   Abdominal: Soft. Bowel sounds are normal.   Musculoskeletal: She exhibits no edema.   Neurological: She is alert and oriented to person, place, and time.   Skin: Skin is warm and dry.   Psychiatric: She has a normal mood and affect. Her behavior is normal. Thought content normal.         Assessment:       1. Mitral valve disease    2. Non-rheumatic mitral regurgitation    3. Severe mitral regurgitation    4. S/P MVR (mitral valve replacement)    5. Nonrheumatic aortic valve stenosis    6. Acute on chronic diastolic heart failure    7. Coronary artery disease involving native coronary artery of native heart without angina pectoris    8. S/P CABG x 2    9. Congestive heart failure, unspecified HF chronicity, unspecified heart failure type    10. Essential hypertension    11. Mixed hyperlipidemia    12. PAD (peripheral artery disease)    13. Stage 3 chronic kidney disease    14. Long term (current) use of anticoagulants [Z79.01]    15. Hyperthyroidism    16. Obesity (BMI 30.0-34.9)      76 y/o pt with hx and presentation as above. Doing well from a cardiac perspective and compensated from a HF perspective, currently with NYHA FC I symptoms. Has a dental appt today, but taking Amoxicillin gives her N/V. Will prescribe clindamycin - endocarditis prophylaxis card given. Will be moving to [place] soon permanently. Discussed the etiology, evaluation, and management of valvular  heart disease. Discussed the importance of med compliance, heart healthy diet, and regular exercise. At least yearly 2DE.     Plan:       -Continue current medical management  -Clindamycin 600 mg once prior to dental procedure  -Needs to establish care in [place]

## 2019-06-18 NOTE — PROGRESS NOTES
Mr. Walter Nunes was seen in follow up for activation of his artificial urinary sphincter. Past Medical History:   Diagnosis Date    Arthritis     Chondromatosis     lower spine tumor    Depression     GERD (gastroesophageal reflux disease)     Hypertension     Insomnia     Lower paraplegia (HCC)     ankles down    Meningitis spinal     at 8years old    PONV (postoperative nausea and vomiting)        Past Surgical History:   Procedure Laterality Date    ABDOMEN SURGERY      ANUS SURGERY N/A 5/9/2019    REVISION OF ARTIFICAL URINARY SPHINCTER USING A DOUBLE CUFF, WITH CYSTOSCOPY performed by Fabian Howell MD at [address] [place] [address]      sphincter augmentation   ARTIFICIAL SPHINCTER MRI SAFE 1.5T    COLONOSCOPY      COLOSTOMY      DILATATION, ESOPHAGUS      ENDOSCOPY, COLON, DIAGNOSTIC      FRACTURE SURGERY      NEPHROLITHOTOMY Left 8/15/2017    LEFT PERCUTANEOUS NEPHROLITHOTRIPSY performed by Fabian Howell MD at [address]    skin flap bottom    SPINE SURGERY      Multiple    TENDON RELEASE Bilateral     feet    URETER SURGERY      replaced on left as kid    URETHRA SURGERY  02/17/2014    REVISION OF URETHRAL SPHINCTER       Current Outpatient Medications on File Prior to Visit   Medication Sig Dispense Refill    Pantoprazole Sodium (PROTONIX PO) Take 40 mg by mouth 2 times daily       oxybutynin (DITROPAN) 5 MG tablet Take 1 tablet by mouth 3 times daily 270 tablet 3    Misc. Devices (Algernon Miss) MISC 4\" RoHo cushion Diagnosis: G82.20 Lower Paraplegia 1 each 0    oxyCODONE-acetaminophen (PERCOCET)  MG per tablet Take 1 tablet by mouth every 8 hours as needed for Pain .  gabapentin (NEURONTIN) 800 MG tablet Take 800 mg by mouth 3 times daily      fentaNYL (DURAGESIC) 25 MCG/HR Place 1 patch onto the skin every 72 hours . No current facility-administered medications on file prior to visit. Allergies   Allergen Reactions    Asa Buff (Mag [Buffered Aspirin]      Tongue swelling    Prednisone      Insomnia, confusion, restless,    Tape [Adhesive Tape] Rash       Family History   Problem Relation Age of Onset    Diabetes Mother     High Blood Pressure Mother     Kidney Disease Mother     Cancer Neg Hx     Stroke Neg Hx     Heart Disease Neg Hx        Social History     Socioeconomic History    Marital status:      Spouse name: Not on file    Number of children: Not on file    Years of education: Not on file    Highest education level: Not on file   Occupational History    Not on file   Social Needs    Financial resource strain: Not on file    Food insecurity:     Worry: Not on file     Inability: Not on file    Transportation needs:     Medical: Not on file     Non-medical: Not on file   Tobacco Use    Smoking status: Former Smoker     Packs/day: 1.00     Last attempt to quit: 2013     Years since quittin.8    Smokeless tobacco: Never Used    Tobacco comment: using vapor no nicotine   Substance and Sexual Activity    Alcohol use: Yes     Comment: rarely    Drug use: No    Sexual activity: Not Currently   Lifestyle    Physical activity:     Days per week: Not on file     Minutes per session: Not on file    Stress: Not on file   Relationships    Social connections:     Talks on phone: Not on file     Gets together: Not on file     Attends Jain service: Not on file     Active member of club or organization: Not on file     Attends meetings of clubs or organizations: Not on file     Relationship status: Not on file    Intimate partner violence:     Fear of current or ex partner: Not on file     Emotionally abused: Not on file     Physically abused: Not on file     Forced sexual activity: Not on file   Other Topics Concern    Not on file   Social History Narrative    Not on file       Review of Systems  No problems with ears, nose or throat.   No problems

## 2019-06-19 ENCOUNTER — CARE COORDINATION (OUTPATIENT)
Dept: CASE MANAGEMENT | Age: 49
End: 2019-06-19

## 2019-06-19 NOTE — CARE COORDINATION
[facility] Transitions Follow Up Call    2019    Patient: Roni Heredia. Patient : 1970   MRN: [id number]  Reason for Admission:   Discharge Date: 19 RARS: Readmission Risk Score: 11    2nd Attempted sub call. No answer. Phone just rang.            Follow Up  Future Appointments   Date Time Provider Karel iWseman   2019 10:10 AM Oralia Vogel MD SRP[facility] - BRIANA ALEXANDER AM [place]   2019  9:00 AM MD BRIANA Neff [place] [address] - Severa Fritter, RN

## 2019-06-20 ENCOUNTER — CARE COORDINATION (OUTPATIENT)
Dept: CASE MANAGEMENT | Age: 49
End: 2019-06-20

## 2019-06-20 NOTE — CARE COORDINATION
[facility] Transitions Follow Up Call    2019    Patient: Jhon Gibbs. Patient : 1970   MRN: [id number]  Reason for Admission:   Discharge Date: 19 RARS: Readmission Risk Score: 11       3rd attempt for sub call. Left message to return call.     Follow Up  Future Appointments   Date Time Provider Karel Wiseman   2019  9:00 AM MD BRIANA Ayala AM, II.[facility] - Janneth Glasgow RN

## 2019-07-05 ENCOUNTER — TELEPHONE (OUTPATIENT)
Dept: FAMILY MEDICINE CLINIC | Age: 49
End: 2019-07-05

## 2019-07-05 DIAGNOSIS — G82.20 LOWER PARAPLEGIA (HCC): Primary | ICD-10-CM

## 2019-07-05 NOTE — TELEPHONE ENCOUNTER
07/05/2019 Coco from [facility] health nurse called saying patient is asking for a Physical Therapy evaluation by their PT department. Patient is not able to lay down completely in bed.   They are asking for order to be placed in Epic so they can pull it off.da

## 2019-09-13 ENCOUNTER — CARE COORDINATION (OUTPATIENT)
Dept: CARE COORDINATION | Age: 49
End: 2019-09-13

## 2019-09-23 ENCOUNTER — CARE COORDINATION (OUTPATIENT)
Dept: CARE COORDINATION | Age: 49
End: 2019-09-23

## 2019-10-01 ENCOUNTER — CARE COORDINATION (OUTPATIENT)
Dept: CARE COORDINATION | Age: 49
End: 2019-10-01

## 2019-10-09 ENCOUNTER — CARE COORDINATION (OUTPATIENT)
Dept: CARE COORDINATION | Age: 49
End: 2019-10-09

## 2019-10-18 ENCOUNTER — CARE COORDINATION (OUTPATIENT)
Dept: CARE COORDINATION | Age: 49
End: 2019-10-18

## 2019-10-24 ENCOUNTER — OFFICE VISIT (OUTPATIENT)
Dept: FAMILY MEDICINE CLINIC | Age: 49
End: 2019-10-24
Payer: MEDICARE

## 2019-10-24 DIAGNOSIS — K21.9 GASTROESOPHAGEAL REFLUX DISEASE, ESOPHAGITIS PRESENCE NOT SPECIFIED: Chronic | ICD-10-CM

## 2019-10-24 DIAGNOSIS — I10 HTN (HYPERTENSION), BENIGN: Primary | ICD-10-CM

## 2019-10-24 PROCEDURE — G8482 FLU IMMUNIZE ORDER/ADMIN: HCPCS | Performed by: FAMILY MEDICINE

## 2019-10-24 PROCEDURE — G0008 ADMIN INFLUENZA VIRUS VAC: HCPCS | Performed by: FAMILY MEDICINE

## 2019-10-24 PROCEDURE — 99213 OFFICE O/P EST LOW 20 MIN: CPT | Performed by: FAMILY MEDICINE

## 2019-10-24 PROCEDURE — G8420 CALC BMI NORM PARAMETERS: HCPCS | Performed by: FAMILY MEDICINE

## 2019-10-24 PROCEDURE — G8427 DOCREV CUR MEDS BY ELIG CLIN: HCPCS | Performed by: FAMILY MEDICINE

## 2019-10-24 PROCEDURE — 90688 IIV4 VACCINE SPLT 0.5 ML IM: CPT | Performed by: FAMILY MEDICINE

## 2019-10-24 PROCEDURE — 1036F TOBACCO NON-USER: CPT | Performed by: FAMILY MEDICINE

## 2019-10-27 VITALS
SYSTOLIC BLOOD PRESSURE: 138 MMHG | DIASTOLIC BLOOD PRESSURE: 88 MMHG | TEMPERATURE: 98.4 F | HEART RATE: 83 BPM | RESPIRATION RATE: 16 BRPM

## 2019-10-27 ASSESSMENT — ENCOUNTER SYMPTOMS
DIARRHEA: 0
SHORTNESS OF BREATH: 0
ABDOMINAL PAIN: 0
EYE PAIN: 0
CONSTIPATION: 0
NAUSEA: 0
ABDOMINAL DISTENTION: 0
COUGH: 0
SORE THROAT: 0
SINUS PRESSURE: 0
RHINORRHEA: 0

## 2019-10-28 ENCOUNTER — CARE COORDINATION (OUTPATIENT)
Dept: CARE COORDINATION | Age: 49
End: 2019-10-28

## 2019-11-04 ENCOUNTER — CARE COORDINATION (OUTPATIENT)
Dept: CARE COORDINATION | Age: 49
End: 2019-11-04

## 2019-12-16 ENCOUNTER — TELEPHONE (OUTPATIENT)
Dept: UROLOGY | Age: 49
End: 2019-12-16

## 2020-03-25 PROBLEM — N30.00 ACUTE CYSTITIS WITHOUT HEMATURIA: Status: RESOLVED | Noted: 2020-03-25 | Resolved: 2020-03-24

## 2021-06-01 ENCOUNTER — OFFICE VISIT (OUTPATIENT)
Dept: FAMILY MEDICINE CLINIC | Age: 51
End: 2021-06-01
Payer: MEDICARE

## 2021-06-01 DIAGNOSIS — G82.20 LOWER PARAPLEGIA (HCC): Primary | ICD-10-CM

## 2021-06-01 DIAGNOSIS — I10 HTN (HYPERTENSION), BENIGN: ICD-10-CM

## 2021-06-01 DIAGNOSIS — Z12.5 SCREENING PSA (PROSTATE SPECIFIC ANTIGEN): ICD-10-CM

## 2021-06-01 PROCEDURE — G8421 BMI NOT CALCULATED: HCPCS | Performed by: FAMILY MEDICINE

## 2021-06-01 PROCEDURE — 3017F COLORECTAL CA SCREEN DOC REV: CPT | Performed by: FAMILY MEDICINE

## 2021-06-01 PROCEDURE — 99213 OFFICE O/P EST LOW 20 MIN: CPT | Performed by: FAMILY MEDICINE

## 2021-06-01 PROCEDURE — G8427 DOCREV CUR MEDS BY ELIG CLIN: HCPCS | Performed by: FAMILY MEDICINE

## 2021-06-01 PROCEDURE — 1036F TOBACCO NON-USER: CPT | Performed by: FAMILY MEDICINE

## 2021-06-01 RX ORDER — CUSHION
EACH MISCELLANEOUS
Qty: 1 EACH | Refills: 0 | Status: SHIPPED | OUTPATIENT
Start: 2021-06-01

## 2021-06-01 RX ORDER — AMITRIPTYLINE HYDROCHLORIDE 75 MG/1
1 TABLET, FILM COATED ORAL NIGHTLY PRN
COMMUNITY

## 2021-06-01 SDOH — ECONOMIC STABILITY: FOOD INSECURITY: WITHIN THE PAST 12 MONTHS, THE FOOD YOU BOUGHT JUST DIDN'T LAST AND YOU DIDN'T HAVE MONEY TO GET MORE.: NEVER TRUE

## 2021-06-01 SDOH — ECONOMIC STABILITY: FOOD INSECURITY: WITHIN THE PAST 12 MONTHS, YOU WORRIED THAT YOUR FOOD WOULD RUN OUT BEFORE YOU GOT MONEY TO BUY MORE.: NEVER TRUE

## 2021-06-01 ASSESSMENT — PATIENT HEALTH QUESTIONNAIRE - PHQ9
SUM OF ALL RESPONSES TO PHQ QUESTIONS 1-9: 0
1. LITTLE INTEREST OR PLEASURE IN DOING THINGS: 0
2. FEELING DOWN, DEPRESSED OR HOPELESS: 0
SUM OF ALL RESPONSES TO PHQ9 QUESTIONS 1 & 2: 0

## 2021-06-01 ASSESSMENT — SOCIAL DETERMINANTS OF HEALTH (SDOH): HOW HARD IS IT FOR YOU TO PAY FOR THE VERY BASICS LIKE FOOD, HOUSING, MEDICAL CARE, AND HEATING?: NOT HARD AT ALL

## 2021-06-02 VITALS — RESPIRATION RATE: 16 BRPM | SYSTOLIC BLOOD PRESSURE: 138 MMHG | HEART RATE: 72 BPM | DIASTOLIC BLOOD PRESSURE: 88 MMHG

## 2021-06-02 ASSESSMENT — ENCOUNTER SYMPTOMS
CONSTIPATION: 0
EYE PAIN: 0
SORE THROAT: 0
SHORTNESS OF BREATH: 0
RHINORRHEA: 0
COUGH: 0
DIARRHEA: 0
SINUS PRESSURE: 0
ABDOMINAL PAIN: 0
NAUSEA: 0
ABDOMINAL DISTENTION: 0

## 2021-06-03 NOTE — PROGRESS NOTES
[address] [address]  Dept: [phone]  Dept Fax: [phone]  Loc: [phone]  PROGRESS NOTE      VisitDate: 6/1/2021    Kateryna Conteh is a 46 y.o. male who presents today for:     Chief Complaint   Patient presents with    Check-Up     HTN, lower paraplegia.  Immunizations     discuss covid inj.  Orders     needs DME Rx's. Subjective:  HPI  Patient history of lower paraplegia wheelchair-bound comes in for annual checkup. His cushion on his wheelchair seat is worn out. This increases his risk of skin breakdown. He does a good job caring for himself but he needs a new cushion to prevent any issues from occurring. History of hypertension initially elevated but is stable    Review of Systems   Constitutional: Negative for appetite change and fever. HENT: Negative for congestion, ear pain, postnasal drip, rhinorrhea, sinus pressure and sore throat. Eyes: Negative for pain and visual disturbance. Respiratory: Negative for cough and shortness of breath. Cardiovascular: Negative for chest pain. Gastrointestinal: Negative for abdominal distention, abdominal pain, constipation, diarrhea and nausea. Genitourinary: Negative for dysuria, frequency and urgency. Musculoskeletal: Negative for arthralgias. Skin: Negative for rash. Neurological: Negative for dizziness.      Past Medical History:   Diagnosis Date    Arthritis     Chondromatosis     lower spine tumor    Depression     GERD (gastroesophageal reflux disease)     Hypertension     Insomnia     Lower paraplegia (HCC)     ankles down    Meningitis spinal     at 8years old    PONV (postoperative nausea and vomiting)       Past Surgical History:   Procedure Laterality Date    ABDOMEN SURGERY      ANUS SURGERY N/A 5/9/2019    REVISION OF ARTIFICAL URINARY SPHINCTER USING A DOUBLE CUFF, WITH CYSTOSCOPY performed by Laureano Mckeon Anthony Beltran MD at [facility]      sphincter augmentation   ARTIFICIAL SPHINCTER MRI SAFE 1.5T    COLONOSCOPY      COLOSTOMY      DILATATION, ESOPHAGUS      ENDOSCOPY, COLON, DIAGNOSTIC      FRACTURE SURGERY      NEPHROLITHOTOMY Left 8/15/2017    LEFT PERCUTANEOUS NEPHROLITHOTRIPSY performed by Komal Jones MD at [facility] [address]    skin flap bottom    SPINE SURGERY      Multiple    TENDON RELEASE Bilateral     feet    URETER SURGERY      replaced on left as kid    URETHRA SURGERY  2014    REVISION OF URETHRAL SPHINCTER     Family History   Problem Relation Age of Onset    Diabetes Mother     High Blood Pressure Mother     Kidney Disease Mother     Cancer Neg Hx     Stroke Neg Hx     Heart Disease Neg Hx      Social History     Tobacco Use    Smoking status: Former Smoker     Packs/day: 1.00     Quit date: 2013     Years since quittin.8    Smokeless tobacco: Never Used    Tobacco comment: using vapor no nicotine   Substance Use Topics    Alcohol use: Yes     Comment: rarely      Current Outpatient Medications   Medication Sig Dispense Refill    amitriptyline (ELAVIL) 75 MG tablet Take 1 tablet by mouth nightly as needed      Misc. Devices (Dwaine Canary) MISC 4\" [facility] Diagnosis: G82.20 Lower Paraplegia 1 each 0    NONFORMULARY Tramadol- unsure of dosage  1-2 tabs. TID PRN      Pantoprazole Sodium (PROTONIX PO) Take 40 mg by mouth 2 times daily       oxybutynin (DITROPAN) 5 MG tablet Take 1 tablet by mouth 3 times daily 270 tablet 3    oxyCODONE-acetaminophen (PERCOCET)  MG per tablet Take 1 tablet by mouth every 8 hours as needed for Pain .  gabapentin (NEURONTIN) 800 MG tablet Take 800 mg by mouth 3 times daily      fentaNYL (DURAGESIC) 25 MCG/HR Place 1 patch onto the skin every 72 hours . No current facility-administered medications for this visit.      Allergies   Allergen Reactions    Asa Buff (Mag [Buffered Aspirin]      Tongue swelling    Prednisone      Insomnia, confusion, restless,    Tape [Adhesive Tape] Rash     Health Maintenance   Topic Date Due    Hepatitis C screen  Never done    COVID-19 Vaccine (1) Never done    HIV screen  Never done    DTaP/Tdap/Td vaccine (1 - Tdap) Never done    Annual Wellness Visit (AWV)  Never done    Shingles Vaccine (1 of 2) Never done    Colon cancer screen colonoscopy  Never done    Potassium monitoring  06/11/2020    Creatinine monitoring  06/11/2020    Flu vaccine (Season Ended) 09/01/2021    Lipid screen  06/10/2024    Hepatitis A vaccine  Aged Out    Hepatitis B vaccine  Aged Out    Hib vaccine  Aged Out    Meningococcal (ACWY) vaccine  Aged Out    Pneumococcal 0-64 years Vaccine  Aged Out         Objective:     Physical Exam  Constitutional:       General: He is not in acute distress. Appearance: He is well-developed. He is not diaphoretic. HENT:      Head: Normocephalic and atraumatic. Right Ear: External ear normal.      Left Ear: External ear normal.   Eyes:      Conjunctiva/sclera: Conjunctivae normal.   Neck:      Vascular: No JVD. Cardiovascular:      Rate and Rhythm: Normal rate and regular rhythm. Heart sounds: Normal heart sounds. Pulmonary:      Effort: Pulmonary effort is normal.      Breath sounds: Normal breath sounds. No wheezing or rales. Musculoskeletal:         General: No tenderness. Skin:     General: Skin is warm and dry. Coloration: Skin is not pale. Neurological:      Mental Status: He is alert and oriented to person, place, and time. Sensory: Sensory deficit present. Motor: Weakness present.       Coordination: Coordination abnormal.      Deep Tendon Reflexes: Reflexes abnormal.      Comments: Wheelchair-bound paraplegic, expected muscle atrophy lower extremities no wound or skin breakdown       BP (!) 182/90 Comment: right  Pulse 72   Resp 16 Impression/Plan:  1. Lower paraplegia (HCC)    2. Screening PSA (prostate specific antigen)    3. HTN (hypertension), benign      Requested Prescriptions     Signed Prescriptions Disp Refills    Misc. Devices (WHEELCHAIR CUSHION) MISC 1 each 0     Si\" RoHo cushion Diagnosis: G82.20 Lower Paraplegia     Orders Placed This Encounter   Procedures    Brace     Bilateral short leg brace    Lipid Panel     Standing Status:   Future     Standing Expiration Date:   2022     Order Specific Question:   Is Patient Fasting?/# of Hours     Answer:   yes/12    Comprehensive Metabolic Panel     Standing Status:   Future     Standing Expiration Date:   2022    PSA screening     Standing Status:   Future     Standing Expiration Date:   2022    CBC Auto Differential     Standing Status:   Future     Standing Expiration Date:   2022   Patient requires new wheelchair cushion as his current is broken down and increases risk of skin breakdown and ulceration    Patient giveneducational materials - see patient instructions. Discussed use, benefit, and side effects of prescribed medications. All patient questions answered. Pt voiced understanding. Reviewed health maintenance. Patient agreedwith treatment plan. Follow up as directed. **This report has been created using voice recognition software. It may contain minor errorswhich are inherent in voice recognition technology. **       Electronically signed by Camelia Mcgill MD on 2021 at 9:35 PM

## 2021-06-04 ENCOUNTER — TELEPHONE (OUTPATIENT)
Dept: FAMILY MEDICINE CLINIC | Age: 51
End: 2021-06-04

## 2021-06-04 NOTE — TELEPHONE ENCOUNTER
Cecilia Holbrook from [facility] called wanting to know what brace he needs. Also states documentation needs to be in the office note. Left a message on pt's machine to call back to clarify.

## 2022-02-11 LAB
ABSOLUTE BASO #: 0.1 X10E9/L (ref 0–0.2)
ABSOLUTE EOS #: 0.1 X10E9/L (ref 0–0.4)
ABSOLUTE LYMPH #: 1.6 X10E9/L (ref 1–3.5)
ABSOLUTE MONO #: 0.6 X10E9/L (ref 0–0.9)
ABSOLUTE NEUT #: 4.7 X10E9/L (ref 1.5–6.6)
ALBUMIN SERPL-MCNC: 4 G/DL (ref 3.2–5.3)
ALK PHOSPHATASE: 140 U/L (ref 39–130)
ALT SERPL-CCNC: 21 U/L (ref 0–40)
ANION GAP SERPL CALCULATED.3IONS-SCNC: 8 MMOL/L (ref 5–15)
AST SERPL-CCNC: 20 U/L (ref 0–41)
BASOPHILS RELATIVE PERCENT: 1.3 %
BILIRUB SERPL-MCNC: 0.2 MG/DL (ref 0.3–1.2)
BUN BLDV-MCNC: 10 MG/DL (ref 5–23)
CALCIUM SERPL-MCNC: 9 MG/DL (ref 8.5–10.5)
CHLORIDE BLD-SCNC: 106 MMOL/L (ref 98–109)
CHOLESTEROL/HDL RATIO: 3.6 (ref 1–5)
CHOLESTEROL: 141 MG/DL (ref 150–200)
CO2: 27 MMOL/L (ref 22–32)
CREAT SERPL-MCNC: 0.74 MG/DL (ref 0.6–1.3)
EGFR AFRICAN AMERICAN: >60 ML/MIN/1.73SQ.M
EGFR IF NONAFRICAN AMERICAN: >60 ML/MIN/1.73SQ.M
EOSINOPHILS RELATIVE PERCENT: 1.8 %
GLUCOSE: 101 MG/DL (ref 65–99)
HCT VFR BLD CALC: 39.6 % (ref 39–49)
HDLC SERPL-MCNC: 39 MG/DL
HEMOGLOBIN: 12.6 G/DL (ref 13–17)
LDL CHOLESTEROL CALCULATED: 67 MG/DL
LDL/HDL RATIO: 1.7
LYMPHOCYTE %: 22.2 %
MCH RBC QN AUTO: 24.1 PG (ref 27–34)
MCHC RBC AUTO-ENTMCNC: 31.9 G/DL (ref 32–36)
MCV RBC AUTO: 76 FL (ref 80–100)
MONOCYTES # BLD: 8.8 %
NEUTROPHILS RELATIVE PERCENT: 65.9 %
PDW BLD-RTO: 16.4 % (ref 11.5–15)
PLATELETS: 316 X10E9/L (ref 150–450)
PMV BLD AUTO: 8.7 FL (ref 7–12)
POTASSIUM SERPL-SCNC: 3.6 MMOL/L (ref 3.5–5)
PSA, ULTRASENSITIVE: 0.47 NG/ML (ref 0–4)
RBC: 5.24 X10E12/L (ref 4.1–5.7)
SODIUM BLD-SCNC: 141 MMOL/L (ref 134–146)
TOTAL PROTEIN: 7.9 G/DL (ref 6–8)
TRIGL SERPL-MCNC: 173 MG/DL (ref 27–150)
VLDLC SERPL CALC-MCNC: 35 MG/DL (ref 0–30)
WBC: 7.2 X10E9/L (ref 4–11)

## 2025-05-14 ENCOUNTER — OFFICE VISIT (OUTPATIENT)
Dept: PHYSICAL MEDICINE AND REHAB | Age: 55
End: 2025-05-14
Payer: MEDICARE

## 2025-05-14 VITALS
WEIGHT: 145 LBS | DIASTOLIC BLOOD PRESSURE: 106 MMHG | BODY MASS INDEX: 21.48 KG/M2 | HEIGHT: 69 IN | SYSTOLIC BLOOD PRESSURE: 146 MMHG

## 2025-05-14 DIAGNOSIS — G89.4 CHRONIC PAIN SYNDROME: Primary | ICD-10-CM

## 2025-05-14 DIAGNOSIS — S34.3XXS CAUDA EQUINA SPINAL CORD INJURY, SEQUELA: ICD-10-CM

## 2025-05-14 DIAGNOSIS — N31.9 NEUROGENIC BLADDER: ICD-10-CM

## 2025-05-14 DIAGNOSIS — M79.2 NEUROPATHIC PAIN: ICD-10-CM

## 2025-05-14 DIAGNOSIS — F11.20 UNCOMPLICATED OPIOID DEPENDENCE (HCC): ICD-10-CM

## 2025-05-14 PROCEDURE — 3077F SYST BP >= 140 MM HG: CPT | Performed by: ANESTHESIOLOGY

## 2025-05-14 PROCEDURE — G8427 DOCREV CUR MEDS BY ELIG CLIN: HCPCS | Performed by: ANESTHESIOLOGY

## 2025-05-14 PROCEDURE — 3080F DIAST BP >= 90 MM HG: CPT | Performed by: ANESTHESIOLOGY

## 2025-05-14 PROCEDURE — 3017F COLORECTAL CA SCREEN DOC REV: CPT | Performed by: ANESTHESIOLOGY

## 2025-05-14 PROCEDURE — 1036F TOBACCO NON-USER: CPT | Performed by: ANESTHESIOLOGY

## 2025-05-14 PROCEDURE — 99205 OFFICE O/P NEW HI 60 MIN: CPT | Performed by: ANESTHESIOLOGY

## 2025-05-14 PROCEDURE — G8420 CALC BMI NORM PARAMETERS: HCPCS | Performed by: ANESTHESIOLOGY

## 2025-05-14 RX ORDER — AMITRIPTYLINE HYDROCHLORIDE 10 MG/1
10 TABLET ORAL NIGHTLY PRN
COMMUNITY

## 2025-05-14 NOTE — PROGRESS NOTES
Chronic Pain/PM&R Clinic Note     Encounter Date: 5/14/25    Subjective:   Chief Complaint:   Chief Complaint   Patient presents with    New Patient     Lower half body pain        History of Present Illness:   Daniel Rankin Jr. is a 55 y.o. male seen in the clinic initially on 05/14/25 upon request from ASHWIN Maldonado for his history of chronic pain.  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  He since then has been paraplegic and wheelchair dependent with neurogenic bladder.  He has been dealing with chronic lower half of body pain ever since he was a child.  He states that he was seen Dr. Estrada for the management of his ongoing issues from his spinal cord injury.  He states that he was doing very well with pain management control with a fentanyl patch, oxycodone, and some of his neuropathic agents.  He states that there was an issue with these medications being prescribed and was weaned off of his fentanyl patch and now is taking only oxycodone 3 times a day.  He states that he is not taking any other medications for pain but is on Ditropan and Protonix and has not seen his family medicine provider in a few years.  He states that his pain currently is a constant 9/10 debilitating pain throughout his entire lower half of his body.  He states that he would like to get reinitiated on some of his medications that were effective for him.  He feels like his oxycodone even is an effective at controlling his pain.  He is interested in other avenues for pain management as well and is wondering what options are available to help treat him at this point.      Current Treatment Medications:   Oxycodone 10 mg 3 times daily as needed    Historical Treatment Medications:   Fentanyl patch 25 mcg  Lyrica  Gabapentin      Past Medical History:   Diagnosis Date    Arthritis     Chondromatosis     lower spine tumor    Depression     GERD (gastroesophageal reflux disease)

## 2025-05-14 NOTE — PROGRESS NOTES
Functionality Assessment/Goals Worksheet     On a scale of 0 (Does not Interfere) to 10 (Completely Interferes)     1.  Which number describes how during the past week pain has interfered with           the following:  A.  General Activity:  10  B.  Mood: 10  C.  Walking Ability:  10  D.  Normal Work (Includes both work outside the home and housework):  10  E.  Relations with Other People:   10  F.  Sleep:   10  G.  Enjoyment of Life:   10    2.  Patient Prefers to Take their Pain Medications:     [x]  On a regular basis   []  Only when necessary    []  Does not take pain medications    3.  What are the Patient's Goals/Expectations for Visiting Pain Management?     []  Learn about my pain    [x]  Receive Medication   []  Physical Therapy     []  Treat Depression   []  Receive Injections    []  Treat Sleep   []  Deal with Anxiety and Stress   []  Treat Opoid Dependence/Addiction   []  Other:

## 2025-05-15 RX ORDER — PANTOPRAZOLE SODIUM 40 MG/1
40 TABLET, DELAYED RELEASE ORAL 2 TIMES DAILY
Qty: 60 TABLET | Refills: 0 | Status: SHIPPED | OUTPATIENT
Start: 2025-05-15

## 2025-05-15 RX ORDER — OXYBUTYNIN CHLORIDE 5 MG/1
5 TABLET ORAL 3 TIMES DAILY
Qty: 90 TABLET | Refills: 0 | Status: SHIPPED | OUTPATIENT
Start: 2025-05-15

## 2025-05-22 ENCOUNTER — TELEPHONE (OUTPATIENT)
Dept: PHYSICAL MEDICINE AND REHAB | Age: 55
End: 2025-05-22

## 2025-05-22 RX ORDER — OXYCODONE AND ACETAMINOPHEN 10; 325 MG/1; MG/1
1 TABLET ORAL EVERY 8 HOURS PRN
Refills: 0 | Status: CANCELLED | OUTPATIENT
Start: 2025-05-22

## 2025-05-22 NOTE — TELEPHONE ENCOUNTER
OARRS reviewed. UDS: + for  oxycodone. Delta 8 thc and cotinine are present.   Last seen: 5/14/2025. Follow-up: No future appointments.    According to office visit you were waiting on UDS results to schedule and talk about medications. UDS came in yesterday.

## 2025-05-22 NOTE — TELEPHONE ENCOUNTER
Pt states that he has been unable to get medications from pharmacy, he's unsure why - thinks they need a referral? For oxybutynin and pantoprazole. Please advise.

## 2025-05-22 NOTE — TELEPHONE ENCOUNTER
Daniel Rankin Jr. called requesting a refill on the following medications:  Requested Prescriptions     Pending Prescriptions Disp Refills    oxyCODONE-acetaminophen (PERCOCET)  MG per tablet  0     Sig: Take 1 tablet by mouth every 8 hours as needed for Pain. Max Daily Amount: 3 tablets     Pharmacy verified:    [id number] - [place], [place] - [address] - P [phone] - F [phone]      Date of last visit: 05/14/2025  Date of next visit (if applicable): Visit date not found

## 2025-05-22 NOTE — TELEPHONE ENCOUNTER
Do you want him to see a CNP since results back. He is asking if will resume the fentanyl patch since your office visit notes says to be off percocet and may potentially restart the fentanyl. With the uds showing THC does he need to repeat the uds before can get script? He said he tried a gummy end of April and has not used since.

## 2025-05-23 NOTE — TELEPHONE ENCOUNTER
He only sent 1 month supply. I would stick with this for now until Dr. Pratt can address next refill.

## 2025-05-23 NOTE — TELEPHONE ENCOUNTER
PA sent to plan  (Key: [id number])  Prior Authorization Not Required  Pantoprazole Sodium 40MG dr tablets  [employer] Electronic Prior Authorization Request Form     PA sent to plan  Prior Authorization Not Required  oxyBUTYnin Chloride 5MG tablets  [employer] Electronic Prior Authorization Request Form

## 2025-05-23 NOTE — TELEPHONE ENCOUNTER
I contacted [facility] pharmacy and I was told that it's not that this patient needs a prior authorization for oxyBUTYnin (DITROPAN) 5 MG tablet  and   pantoprazole (PROTONIX) 40 MG tablet  but that they need a verbal from Dr Pratt that it is okay to fill for 90 days.     Dr Pratt please see previous messages and advise

## 2025-05-27 RX ORDER — OXYBUTYNIN CHLORIDE 5 MG/1
5 TABLET ORAL 3 TIMES DAILY
Qty: 90 TABLET | Refills: 0 | Status: SHIPPED | OUTPATIENT
Start: 2025-06-15

## 2025-05-27 RX ORDER — PANTOPRAZOLE SODIUM 40 MG/1
40 TABLET, DELAYED RELEASE ORAL 2 TIMES DAILY
Qty: 60 TABLET | Refills: 0 | Status: SHIPPED | OUTPATIENT
Start: 2025-06-15

## 2025-05-27 NOTE — TELEPHONE ENCOUNTER
I will only fill this once; he will no longer be seeing our practice as we are not doing anything related to pain management.

## 2025-05-27 NOTE — TELEPHONE ENCOUNTER
Notif pt of only getting this last refill and to get future from [facility] as will no longer be following with Dr Pratt. He verbalized understanding.

## 2025-05-27 NOTE — TELEPHONE ENCOUNTER
Notif pt of Dr Pratt response and he did not indicate he was going to get a medical marijuana card. Requested refill on protonix and ditropan. Previously done by you  [facility] reviewed. UDS: n/a   Last seen: 5/14/2025. Follow-up: No future appointments.

## (undated) DEVICE — GLOVE ORANGE PI 8   MSG9080

## (undated) DEVICE — SUREFIT, DUAL DISPERSIVE ELECTRODE, CONTACT QUALITY MONITOR: Brand: SUREFIT

## (undated) DEVICE — BLADE LARYNSCP SZ 4 ENH DIR INTUB GLIDESCOPE MCGRATH MAC

## (undated) DEVICE — SPONGE DRN W4XL4IN RAYON/POLYESTER 6 PLY NONWOVEN PRECUT

## (undated) DEVICE — DRAPE C ARM W36XL30IN RECTANG BND BG AND TAPE

## (undated) DEVICE — COVER ARMBRD W13XL28.5IN IMPERV BLU FOR OP RM

## (undated) DEVICE — 60 ML SYRINGE,CATHETER TIP: Brand: MONOJECT

## (undated) DEVICE — LOOP VES W25MM THK1MM MAXI RED SIL FLD REPELLENT 100 PER

## (undated) DEVICE — BLADE CLIPPER SURG SENSICLIP

## (undated) DEVICE — Device: Brand: SENSURA MIO

## (undated) DEVICE — MEDI-VAC NON-CONDUCTIVE SUCTION TUBING 6MM X 6.1M (20 FT.) L: Brand: CARDINAL HEALTH

## (undated) DEVICE — SYRINGE,EAR/ULCER, 2 OZ, STERILE: Brand: MEDLINE

## (undated) DEVICE — SYRINGE MED 30ML STD CLR PLAS LUERLOCK TIP N CTRL DISP

## (undated) DEVICE — GLOVE SURG SZ 65 THK91MIL LTX FREE SYN POLYISOPRENE

## (undated) DEVICE — CHLORAPREP 26ML CLEAR

## (undated) DEVICE — DUP USE 304928 DRESSING GZ 12 PLY 4X4 STRL

## (undated) DEVICE — SKIN AFFIX SURG ADHESIVE 72/CS 0.55ML: Brand: MEDLINE

## (undated) DEVICE — 3M™ TEGADERM™ TRANSPARENT FILM DRESSING FRAME STYLE, 1626W, 4 IN X 4-3/4 IN (10 CM X 12 CM), 50/CT 4CT/CASE: Brand: 3M™ TEGADERM™

## (undated) DEVICE — DRAPE PERC PROC W335XL196CM LINGEMAN

## (undated) DEVICE — DRAINBAG,ANTI-REFLUX TOWER,L/F,2000ML,LL: Brand: MEDLINE

## (undated) DEVICE — CRADLE POS 3X5X24IN RASPBERRY ARM PRONE FOAM DISP

## (undated) DEVICE — SOLUTION IV 1000ML 0.9% SOD CHL PH 5 INJ USP VIAFLX PLAS

## (undated) DEVICE — SUTURE ABSORBABLE BRAIDED 2-0 CT-1 27 IN UD VICRYL J259H

## (undated) DEVICE — ROYAL SILK SURGICAL GOWN, XXL: Brand: CONVERTORS

## (undated) DEVICE — BASIC SINGLE BASIN 1-LF: Brand: MEDLINE INDUSTRIES, INC.

## (undated) DEVICE — 3M™ TRANSPORE™ WHITE SURGICAL TAPE 1534-1, 1 INCH X 10 YARD (2,5CM X 9,1M), 12 ROLLS/CARTON 10 CARTONS/CASE: Brand: 3M™ TRANSPORE™

## (undated) DEVICE — Z INACTIVE USE 2660664 SOLUTION IRRIG 3000ML 0.9% SOD CHL USP UROMATIC PLAS CONT

## (undated) DEVICE — LINER SUCT CANSTR 1500CC SEMI RIG W/ POR HYDROPHOBIC SHUT

## (undated) DEVICE — Z DUP USE 2565107 PACK SURG PROC LEG CYSTO T-DRAPE REINF TBL CVR HND TWL

## (undated) DEVICE — DRESSING TRNSPAR W5XL4.5IN FLM SHT SEMIPERMEABLE WIND

## (undated) DEVICE — DRESSING TRNSPAR W2XL2.75IN FLM SHT SEMIPERMEABLE WIND

## (undated) DEVICE — COVER,TABLE,44X90,STERILE: Brand: MEDLINE

## (undated) DEVICE — SOLUTION SURG PREP POV IOD 7.5% 4 OZ

## (undated) DEVICE — PREP SOL PVP IODINE 4%  4 OZ/BTL

## (undated) DEVICE — SOLUTION IV IRRIG WATER 1000ML POUR BRL 2F7114

## (undated) DEVICE — BAG DRNGE 2000ML UROLOGY ANTI REFLX CHMBR SAMP PRT

## (undated) DEVICE — 3M™ STERI-STRIP™ COMPOUND BENZOIN TINCTURE 40 BAGS/CARTON 4 CARTONS/CASE C1544: Brand: 3M™ STERI-STRIP™

## (undated) DEVICE — 35 ML SYRINGE LUER-LOCK TIP: Brand: MONOJECT

## (undated) DEVICE — GLOVE ORANGE PI 7 1/2   MSG9075

## (undated) DEVICE — STRIP,CLOSURE,WOUND,MEDI-STRIP,1/2X4: Brand: MEDLINE

## (undated) DEVICE — CHLORAPREP 26ML ORANGE

## (undated) DEVICE — 3M™ TRANSPORE™ WHITE SURGICAL TAPE 1534-2, 2 INCH X 10 YARD (5CM X 9,1M), 6 ROLLS/CARTON 10 CARTONS/CASE: Brand: 3M™ TRANSPORE™

## (undated) DEVICE — POSITIONER HD W8XH4XL8.5IN RASPBERRY FOAM SLT

## (undated) DEVICE — DECANTER FLD 9IN ST BG FOR ASEP TRNSF OF FLD

## (undated) DEVICE — NITINOL STONE RETRIEVAL BASKET: Brand: ZERO TIP

## (undated) DEVICE — Y-TYPE TUR/BLADDER IRRIGATION SET, REGULATING CLAMP

## (undated) DEVICE — GAUZE,SPONGE,2"X2",8PLY,STERILE,LF,2'S: Brand: MEDLINE

## (undated) DEVICE — SPONGE GZ W4XL4IN COT 12 PLY TYP VII WVN C FLD DSGN

## (undated) DEVICE — C-ARM: Brand: UNBRANDED

## (undated) DEVICE — HEAD POSITIONER, SURGICAL: Brand: DEROYAL

## (undated) DEVICE — JELLY,LUBE,STERILE,FLIP TOP,TUBE,2-OZ: Brand: MEDLINE

## (undated) DEVICE — CATHETER FOL 2 W 16 FR 5 CC URETH COUNCL TIP SIL LUBRI-SIL

## (undated) DEVICE — 60 ML SYRINGE LUER-LOCK TIP: Brand: MONOJECT

## (undated) DEVICE — BASIC SINGLE BASIN BTC-LF: Brand: MEDLINE INDUSTRIES, INC.

## (undated) DEVICE — GLOVE ORANGE PI 7   MSG9070

## (undated) DEVICE — SOLUTION SCRB 4OZ 4% CHG H2O AIDED FOR PREOPERATIVE SKIN

## (undated) DEVICE — BLADE CLIPPER GEN PURP NS

## (undated) DEVICE — INTENDED FOR TISSUE SEPARATION, AND OTHER PROCEDURES THAT REQUIRE A SHARP SURGICAL BLADE TO PUNCTURE OR CUT.: Brand: BARD-PARKER ® CARBON RIB-BACK BLADES

## (undated) DEVICE — TAPE,CLOTH/SILK,CURAD,3"X10YD,LF,40/CS: Brand: CURAD

## (undated) DEVICE — SUTURE VCRL SZ 4-0 L27IN ABSRB UD L19MM FS-2 3/8 CIR REV J422H

## (undated) DEVICE — 3M™ WARMING BLANKET, UPPER BODY, 10 PER CASE, 42268: Brand: BAIR HUGGER™

## (undated) DEVICE — GAUZE,SPONGE,8"X4",12PLY,XRAY,STRL,LF: Brand: MEDLINE

## (undated) DEVICE — GAUZE,SPONGE,4"X4",12PLY,STERILE,LF,2'S: Brand: MEDLINE

## (undated) DEVICE — TUBING IV STOPCOCK 48 CM 3 W

## (undated) DEVICE — Device

## (undated) DEVICE — CATHETER F BLLN 5CC 14FR 2 W HYDRGEL COAT LESS TRAUM LUB

## (undated) DEVICE — SOLUTION IV IRRIG POUR BRL 0.9% SODIUM CHL 2F7124

## (undated) DEVICE — DISCONTINUED USE 394504 SYRINGE LUER LOCK TIP 12 ML

## (undated) DEVICE — TUBING, SUCTION, 1/4" X 20', STRAIGHT: Brand: MEDLINE INDUSTRIES, INC.

## (undated) DEVICE — OPEN-END FLEXI-TIP URETERAL CATHETER: Brand: FLEXI-TIP

## (undated) DEVICE — PROBE LITHO RENAL BLDR DISP

## (undated) DEVICE — PACK PROCEDURE SURG SET UP SRMC

## (undated) DEVICE — GOWN,SIRUS,NON REINFRCD,LARGE,SET IN SL: Brand: MEDLINE

## (undated) DEVICE — SUTURE VCRL SZ 0 L27IN ABSRB UD L36MM CP-1 1/2 CIR REV CUT J267H

## (undated) DEVICE — PACK PROC LAP II AURORA

## (undated) DEVICE — INTRODUCER SYSTEM: Brand: ACCUSTICK™ II

## (undated) DEVICE — Z DISCONTINUED BY MEDLINE USE 2711682 TRAY SKIN PREP DRY W/ PREM GLV

## (undated) DEVICE — SYRINGE MED 10ML LUERLOCK TIP W/O SFTY DISP

## (undated) DEVICE — CATHETER BALLOON DIL 10 MMX15 CM NEPHROSTOMY X-FORCE N30

## (undated) DEVICE — DEVICE ERECTILE RESTR FOR PENILE PROS

## (undated) DEVICE — 3M™ RANGER™ FLUID WARMING IRRIGATION SET, 24750, 10/CASE: Brand: 3M™ RANGER™

## (undated) DEVICE — GOWN,SIRUS,NONRNF,SETINSLV,XL,20/CS: Brand: MEDLINE